# Patient Record
Sex: FEMALE | Race: WHITE | NOT HISPANIC OR LATINO | Employment: FULL TIME | URBAN - METROPOLITAN AREA
[De-identification: names, ages, dates, MRNs, and addresses within clinical notes are randomized per-mention and may not be internally consistent; named-entity substitution may affect disease eponyms.]

---

## 2019-07-16 RX ORDER — GABAPENTIN 100 MG/1
100 CAPSULE ORAL 2 TIMES DAILY
Qty: 180 CAPSULE | Refills: 1 | Status: SHIPPED | OUTPATIENT
Start: 2019-07-16 | End: 2020-01-20 | Stop reason: SDUPTHER

## 2019-08-06 RX ORDER — OMEPRAZOLE 20 MG/1
20 CAPSULE, DELAYED RELEASE ORAL DAILY
Qty: 90 CAPSULE | Refills: 0 | Status: SHIPPED | OUTPATIENT
Start: 2019-08-06 | End: 2019-11-04 | Stop reason: SDUPTHER

## 2019-08-19 RX ORDER — AMLODIPINE BESYLATE 2.5 MG/1
TABLET ORAL
Qty: 90 TABLET | Refills: 0 | Status: SHIPPED | OUTPATIENT
Start: 2019-08-19 | End: 2019-09-09 | Stop reason: ALTCHOICE

## 2019-09-09 ENCOUNTER — OFFICE VISIT (OUTPATIENT)
Dept: INTERNAL MEDICINE | Facility: CLINIC | Age: 69
End: 2019-09-09

## 2019-09-09 VITALS
BODY MASS INDEX: 43.15 KG/M2 | SYSTOLIC BLOOD PRESSURE: 122 MMHG | WEIGHT: 259 LBS | DIASTOLIC BLOOD PRESSURE: 66 MMHG | HEIGHT: 65 IN | HEART RATE: 80 BPM

## 2019-09-09 DIAGNOSIS — I10 ESSENTIAL HYPERTENSION: Primary | ICD-10-CM

## 2019-09-09 DIAGNOSIS — B37.31 RECURRENT CANDIDIASIS OF VAGINA: ICD-10-CM

## 2019-09-09 DIAGNOSIS — R19.4 BOWEL HABIT CHANGES: ICD-10-CM

## 2019-09-09 PROBLEM — E78.5 HYPERLIPIDEMIA: Status: ACTIVE | Noted: 2019-09-09

## 2019-09-09 PROBLEM — I34.0 MITRAL INSUFFICIENCY: Status: ACTIVE | Noted: 2019-09-09

## 2019-09-09 PROBLEM — R06.83 SNORING: Status: ACTIVE | Noted: 2018-04-23

## 2019-09-09 PROBLEM — K56.609 BOWEL OBSTRUCTION: Status: ACTIVE | Noted: 2019-09-09

## 2019-09-09 PROCEDURE — 99214 OFFICE O/P EST MOD 30 MIN: CPT | Performed by: INTERNAL MEDICINE

## 2019-09-09 RX ORDER — BUSPIRONE HYDROCHLORIDE 15 MG/1
15 TABLET ORAL 2 TIMES DAILY
Refills: 0 | COMMUNITY
Start: 2019-08-09 | End: 2019-11-14 | Stop reason: SDUPTHER

## 2019-09-09 RX ORDER — OLMESARTAN MEDOXOMIL 40 MG/1
TABLET ORAL DAILY
Refills: 1 | COMMUNITY
Start: 2019-08-15 | End: 2020-02-11 | Stop reason: SDUPTHER

## 2019-09-09 RX ORDER — FEXOFENADINE HCL 180 MG/1
180 TABLET ORAL DAILY
COMMUNITY
End: 2022-10-13

## 2019-09-09 RX ORDER — FLUCONAZOLE 100 MG/1
100 TABLET ORAL DAILY
Qty: 7 TABLET | Refills: 1 | Status: SHIPPED | OUTPATIENT
Start: 2019-09-09 | End: 2019-10-16

## 2019-09-09 RX ORDER — SENNOSIDES 8.6 MG
650 CAPSULE ORAL 2 TIMES DAILY
COMMUNITY
End: 2019-10-16

## 2019-09-09 RX ORDER — IBUPROFEN 200 MG
200 TABLET ORAL EVERY 6 HOURS PRN
COMMUNITY
End: 2020-07-20 | Stop reason: ALTCHOICE

## 2019-09-09 RX ORDER — CLOTRIMAZOLE 1 G/ML
SOLUTION TOPICAL 2 TIMES DAILY
COMMUNITY
End: 2021-01-25

## 2019-09-09 RX ORDER — METOPROLOL SUCCINATE 25 MG/1
TABLET, EXTENDED RELEASE ORAL
Refills: 3 | COMMUNITY
Start: 2019-08-15 | End: 2020-03-09 | Stop reason: SDUPTHER

## 2019-09-09 NOTE — PROGRESS NOTES
"  Assessment and Plan  Helena was seen today for hypertension and weight gain.    Diagnoses and all orders for this visit:    Essential hypertension  Comments:  controlled.    Orders:  -     CBC & Differential  -     TSH    Recurrent candidiasis of vagina  Comments:  check BS, treat with longer course of oral antifungals.   Orders:  -     Comprehensive Metabolic Panel  -     Hemoglobin A1c    Bowel habit changes  Comments:  difficult with her dietary problems related to obstruction- can't add fiber- need c-scope to r/o microscopic issue, etc.    Orders:  -     Ambulatory Referral to Gastroenterology    Other orders  -     fluconazole (DIFLUCAN) 100 MG tablet; Take 1 tablet by mouth Daily.      F/U and Patient Instructions    Return in about 3 months (around 12/9/2019).  There are no Patient Instructions on file for this visit.    Subjective    Helena Romero is a 69 y.o. female being seen in our office today for Hypertension and Weight Gain     History of the Present Illness  HPI  Very frustrated with weight gain- diet is limited by her bowel issues- she had a torsion and is off ruffage, nuts, etc.  She is frustrated.  Makes all of her arthritis worse.  She is looking into a 3 wheel bicycle- can ride around her neighborhood.    Recurrent/constant vaginal yeast infection for about 2 years- culture and punch biopsy by Dr. Reed- she has tried several topical antifungals that have helped a little bit it always comes back- notices similar rashes behind her ears, skin folds and under breasts.    She has had multiple loose stools/day for 4-5 years- denies any \"normal\" formed stool.  Can have incontinence.  There are some days she does not have any diarrhea.  Worse on days she has forced OT, etc.    Saw ortho about her R leg pain- di dMRI which showed disc disease- now she takes 2 Tylenol, 2 Advil, 2 Benadryl and 3 neurontin at HS - she falls asleep and stay asleep all night. Uses Advil and Tylenol during the day, as needed. "    Patient History        Significant Past History  The following portions of the patient's history were reviewed and updated as appropriate:PMHroutine: Social history , Allergies, Current Medications, Active Problem List and Health Maintenance              Social History  She  reports that she has never smoked. She does not have any smokeless tobacco history on file. She reports that she does not drink alcohol or use drugs.                         Review of Symptoms  Review of Systems   Constitution: Positive for weight gain.   HENT: Negative.    Cardiovascular: Negative.    Respiratory: Negative.    Endocrine: Negative.    Musculoskeletal: Positive for arthritis and back pain.   Gastrointestinal: Positive for bowel incontinence and diarrhea. Negative for abdominal pain and heartburn.   Genitourinary: Negative.    Neurological: Negative.    Psychiatric/Behavioral: Negative.      Objective  Vital Signs         BP Readings from Last 1 Encounters:   09/09/19 122/66     Wt Readings from Last 3 Encounters:   09/09/19 117 kg (259 lb)   Body mass index is 43.1 kg/m².          Physical Exam   Physical Exam   Constitutional: No distress.   Eyes: No scleral icterus.   Cardiovascular: Normal rate, regular rhythm, normal heart sounds and intact distal pulses.   Pulmonary/Chest: Effort normal and breath sounds normal.   Abdominal: Soft. Tenderness: very mild diffuse tenderness.   Musculoskeletal: She exhibits no edema or tenderness.   Lymphadenopathy:     She has no cervical adenopathy.   Skin: Skin is warm.   Skin folds with erythema,, moist     Data Reviewed    No results found for this or any previous visit (from the past 2016 hour(s)).

## 2019-09-12 ENCOUNTER — TELEPHONE (OUTPATIENT)
Dept: INTERNAL MEDICINE | Facility: CLINIC | Age: 69
End: 2019-09-12

## 2019-09-12 NOTE — TELEPHONE ENCOUNTER
Dr. HOLLAND PT called said that her Premarin is going to be very costly she would like to know if there is an alternative she can try

## 2019-09-30 ENCOUNTER — TELEPHONE (OUTPATIENT)
Dept: INTERNAL MEDICINE | Facility: CLINIC | Age: 69
End: 2019-09-30

## 2019-09-30 NOTE — TELEPHONE ENCOUNTER
Dr. HOLLAND PT would like to be seen today for possible pneumonia please advise time     PT # [phone]

## 2019-10-01 ENCOUNTER — OFFICE VISIT (OUTPATIENT)
Dept: INTERNAL MEDICINE | Facility: CLINIC | Age: 69
End: 2019-10-01

## 2019-10-01 VITALS — BODY MASS INDEX: 42.65 KG/M2 | WEIGHT: 256 LBS | HEIGHT: 65 IN

## 2019-10-01 DIAGNOSIS — Z23 NEED FOR IMMUNIZATION AGAINST INFLUENZA: Primary | ICD-10-CM

## 2019-10-01 DIAGNOSIS — J40 BRONCHITIS: Primary | ICD-10-CM

## 2019-10-01 PROCEDURE — G0008 ADMIN INFLUENZA VIRUS VAC: HCPCS | Performed by: INTERNAL MEDICINE

## 2019-10-01 PROCEDURE — 90653 IIV ADJUVANT VACCINE IM: CPT | Performed by: INTERNAL MEDICINE

## 2019-10-01 PROCEDURE — 99213 OFFICE O/P EST LOW 20 MIN: CPT | Performed by: INTERNAL MEDICINE

## 2019-10-01 RX ORDER — AMOXICILLIN 875 MG/1
875 TABLET, COATED ORAL 2 TIMES DAILY
Qty: 14 TABLET | Refills: 0 | Status: SHIPPED | OUTPATIENT
Start: 2019-10-01 | End: 2020-01-20

## 2019-10-01 NOTE — PROGRESS NOTES
Assessment and Plan  Helena was seen today for wheezing, cough and fatigue.    Diagnoses and all orders for this visit:    Bronchitis  Comments:  treat with abx, rest and inhalers, as she has at home.  Call with worseing.      F/U and Patient Instructions    No Follow-up on file.  There are no Patient Instructions on file for this visit.    Subjective    Helena Romero is a 69 y.o. female being seen in our office today for Wheezing; Cough; and Fatigue     History of the Present Illness  HPI Here with the above complaints- has not been feeling well for about 5 days.  She is taking some OTC sinus meds that don't seem to be helping any longer.  Over the weekend had chills but never measured a fever.  Feels exhausted, wheezing started yesterday evening.  Cough is not productive.  She does have some SOB.      Patient History        Significant Past History  The following portions of the patient's history were reviewed and updated as appropriate:PMHroutine: Social history , Allergies, Current Medications, Active Problem List and Health Maintenance              Social History  She  reports that she has never smoked. She does not have any smokeless tobacco history on file. She reports that she does not drink alcohol or use drugs.                         Review of Symptoms  Review of Systems   Constitution: Positive for chills and malaise/fatigue. Negative for decreased appetite.   HENT: Positive for congestion.    Cardiovascular: Negative.    Respiratory: Positive for cough and wheezing. Negative for shortness of breath and sputum production.    Musculoskeletal: Joint pain: hip pain has resolved since using hemp oil directly  on it just a few times.     Objective  Vital Signs         BP Readings from Last 1 Encounters:   09/09/19 122/66     Wt Readings from Last 3 Encounters:   10/01/19 116 kg (256 lb)   09/09/19 117 kg (259 lb)   Body mass index is 42.6 kg/m².          Physical Exam   Physical Exam   Pulmonary/Chest: She  has no wheezes. Rales: RLL. She exhibits no tenderness.   Musculoskeletal: She exhibits no edema.     Data Reviewed    No results found for this or any previous visit (from the past 2016 hour(s)).

## 2019-10-16 ENCOUNTER — OFFICE VISIT (OUTPATIENT)
Dept: GASTROENTEROLOGY | Facility: CLINIC | Age: 69
End: 2019-10-16

## 2019-10-16 VITALS
TEMPERATURE: 98.3 F | DIASTOLIC BLOOD PRESSURE: 80 MMHG | BODY MASS INDEX: 42.19 KG/M2 | HEIGHT: 65 IN | SYSTOLIC BLOOD PRESSURE: 134 MMHG | WEIGHT: 253.2 LBS

## 2019-10-16 DIAGNOSIS — K59.1 FUNCTIONAL DIARRHEA: Primary | ICD-10-CM

## 2019-10-16 DIAGNOSIS — K21.9 GASTROESOPHAGEAL REFLUX DISEASE, ESOPHAGITIS PRESENCE NOT SPECIFIED: ICD-10-CM

## 2019-10-16 DIAGNOSIS — Z87.19 HISTORY OF SMALL BOWEL OBSTRUCTION: ICD-10-CM

## 2019-10-16 PROCEDURE — 99204 OFFICE O/P NEW MOD 45 MIN: CPT | Performed by: INTERNAL MEDICINE

## 2019-10-16 NOTE — PATIENT INSTRUCTIONS
Continue to monitor diarrhea    Will need colonoscopy if diarrhea returns    For any additional questions, concerns or changes to your condition after today's office visit please contact the office at [phone].

## 2019-10-16 NOTE — PROGRESS NOTES
Take prescribed medication as directed.    Use your inhaler 4 times daily.    Have a follow-up check for worsening or persistent symptoms.    Try not to smoke.   Chief Complaint   Patient presents with   • Diarrhea       Subjective     HPI    Helena Romero is a 69 y.o. female with a past medical history noted below who presents for evaluation of diarrhea.  Symptoms present for 6 years!  She times this to following a small bowel obstruction treated with nasogastric decompression.  She did not have to have any surgery for it..  She has been having 5-6 BMs daily.  Associated with urgency.  Mostly after meals.  No blood in her stol.  No associated weight loss.  Now she has been on abx for a tooth infection and is down to 1 formed BM per day.  She denies that she is ever really tried any other diarrhea suppressing medication in the past.    She has a history of SBO, thought to be due to adhesions following her gallbladder surgery.    No family hx of GI malignancy    Last colonoscopy 8 years ago and normal    She has had a adrian    No smoking, no excess ETOH    Works as a     She does have GERD controlled with daily omeprazole.  She is working on weight loss with her PCP.      Past Medical History:   Diagnosis Date   • Allergic    • Anxiety    • Asthma    • Depression    • Diarrhea    • GERD (gastroesophageal reflux disease)    • Hypertension    • Low back pain    • Neuromuscular disorder (CMS/HCC)    • Obesity    • Pneumonia     x 3   • Yeast infection          Current Outpatient Medications:   •  busPIRone (BUSPAR) 15 MG tablet, Take 15 mg by mouth 2 (Two) Times a Day., Disp: , Rfl: 0  •  clotrimazole (LOTRIMIN) 1 % external solution, Apply  topically to the appropriate area as directed 2 (Two) Times a Day., Disp: , Rfl:   •  conjugated estrogens (PREMARIN) 0.625 MG/GM vaginal cream, Insert  into the vagina Daily., Disp: 30 g, Rfl: 5  •  fexofenadine (ALLEGRA) 180 MG tablet, Take 180 mg by mouth Daily., Disp: , Rfl:   •  gabapentin (NEURONTIN) 100 MG capsule, Take 1 capsule by mouth 2 (Two) Times a Day. (Patient taking differently: Take 300 mg by mouth Every  Night.), Disp: 180 capsule, Rfl: 1  •  ibuprofen (ADVIL,MOTRIN) 200 MG tablet, Take 200 mg by mouth Every 6 (Six) Hours As Needed for Mild Pain ., Disp: , Rfl:   •  metoprolol succinate XL (TOPROL-XL) 25 MG 24 hr tablet, TK 1 T PO  QD PRF PALPITATIONS, Disp: , Rfl: 3  •  olmesartan (BENICAR) 40 MG tablet, Take  by mouth Daily., Disp: , Rfl: 1  •  omeprazole (PRILOSEC) 20 MG capsule, Take 1 capsule by mouth Daily., Disp: 90 capsule, Rfl: 0  •  amoxicillin (AMOXIL) 875 MG tablet, Take 1 tablet by mouth 2 (Two) Times a Day., Disp: 14 tablet, Rfl: 0    Allergies   Allergen Reactions   • Lisinopril Hives   • Codeine Rash       Social History     Socioeconomic History   • Marital status:      Spouse name: Not on file   • Number of children: Not on file   • Years of education: Not on file   • Highest education level: Not on file   Tobacco Use   • Smoking status: Never Smoker   • Smokeless tobacco: Never Used   Substance and Sexual Activity   • Alcohol use: No     Frequency: Never   • Drug use: No       Family History   Problem Relation Age of Onset   • Pancreatic cancer Paternal Aunt        Review of Systems   Constitutional: Negative for activity change, appetite change and fatigue.   HENT: Negative for sore throat and trouble swallowing.    Respiratory: Negative.    Cardiovascular: Negative.    Gastrointestinal: Positive for diarrhea. Negative for abdominal distention, abdominal pain and blood in stool.   Endocrine: Negative for cold intolerance and heat intolerance.   Genitourinary: Negative for difficulty urinating, dysuria and frequency.   Musculoskeletal: Negative for arthralgias, back pain and myalgias.   Skin: Negative.    Hematological: Negative for adenopathy. Does not bruise/bleed easily.   All other systems reviewed and are negative.      Objective     Vitals:    10/16/19 1256   BP: 134/80   Temp: 98.3 °F (36.8 °C)         10/16/19  1256   Weight: 115 kg (253 lb 3.2 oz)     Body mass index is 42.13  kg/m².    Physical Exam   Constitutional: She is oriented to person, place, and time. She appears well-developed and well-nourished. No distress.   Obese   HENT:   Head: Normocephalic and atraumatic.   Right Ear: External ear normal.   Left Ear: External ear normal.   Nose: Nose normal.   Mouth/Throat: Oropharynx is clear and moist.   Eyes: Conjunctivae and EOM are normal. Right eye exhibits no discharge. Left eye exhibits no discharge. No scleral icterus.   Neck: Normal range of motion. Neck supple. No thyromegaly present.   No supraclavicular adenopathy   Cardiovascular: Normal rate, regular rhythm, normal heart sounds and intact distal pulses. Exam reveals no gallop.   No murmur heard.  No lower extremity edema   Pulmonary/Chest: Effort normal and breath sounds normal. No respiratory distress. She has no wheezes.   Abdominal: Soft. Normal appearance and bowel sounds are normal. She exhibits no distension and no mass. There is no hepatosplenomegaly. There is no tenderness. There is no rigidity, no rebound and no guarding. No hernia.   Genitourinary:   Genitourinary Comments: Rectal exam deferred   Musculoskeletal: Normal range of motion. She exhibits no edema or tenderness.   No atrophy of upper or lower extremities.  Normal digits and nails of both hands.   Lymphadenopathy:     She has no cervical adenopathy.   Neurological: She is alert and oriented to person, place, and time. She displays no atrophy. Coordination normal.   Skin: Skin is warm and dry. No rash noted. She is not diaphoretic. No erythema.   Psychiatric: She has a normal mood and affect. Her behavior is normal. Judgment and thought content normal.   Vitals reviewed.      No results found for: WBC, RBC, HGB, HCT, MCV, MCH, MCHC, RDW, RDWSD, MPV, PLT, NEUTRORELPCT, LYMPHORELPCT, MONORELPCT, EOSRELPCT, BASORELPCT, AUTOIGPER, NEUTROABS, LYMPHSABS, MONOSABS, EOSABS, BASOSABS, AUTOIGNUM, NRBC    No results found for: GLUCOSE, NA, K, CO2, CL, ANIONGAP,  CREATININE, BUN, BCR, CALCIUM, EGFRIFNONA, ALKPHOS, PROTEINTOT, ALT, AST, BILITOT, ALBUMIN, GLOB, LABIL2      Imaging Results (last 7 days)     ** No results found for the last 168 hours. 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  Interestingly seems to have responded to amoxicillin    History of small bowel obstruction: Resolved without surgery    GERD: Able on low-dose PPI    Plan  Given that she is doing well at this point with one formed bowel movement a day, will continue to monitor symptoms.  If they return, then will try a bile acid binder and proceed with endoscopy for mucosal samples    Continue the omeprazole    Continue to monitor stools    Helena was seen today for diarrhea.    Diagnoses and all orders for this visit:    Functional diarrhea    History of small bowel obstruction    Gastroesophageal reflux disease, esophagitis presence not specified        I have discussed the above plan with the patient.  They verbalize understanding and are in agreement with the plan.  They have been advised to contact the office for any questions, concerns, or changes related to their health.    Dictated utilizing Dragon dictation

## 2019-11-04 RX ORDER — OMEPRAZOLE 20 MG/1
20 CAPSULE, DELAYED RELEASE ORAL DAILY
Qty: 90 CAPSULE | Refills: 0 | Status: SHIPPED | OUTPATIENT
Start: 2019-11-04 | End: 2020-02-14

## 2019-11-14 RX ORDER — BUSPIRONE HYDROCHLORIDE 15 MG/1
15 TABLET ORAL 2 TIMES DAILY
Qty: 180 TABLET | Refills: 1 | Status: SHIPPED | OUTPATIENT
Start: 2019-11-14 | End: 2020-05-11

## 2020-01-20 ENCOUNTER — OFFICE VISIT (OUTPATIENT)
Dept: INTERNAL MEDICINE | Facility: CLINIC | Age: 70
End: 2020-01-20

## 2020-01-20 VITALS — HEIGHT: 65 IN | WEIGHT: 255 LBS | BODY MASS INDEX: 42.49 KG/M2

## 2020-01-20 DIAGNOSIS — M19.91 PRIMARY OSTEOARTHRITIS, UNSPECIFIED SITE: ICD-10-CM

## 2020-01-20 DIAGNOSIS — I10 ESSENTIAL HYPERTENSION: Primary | ICD-10-CM

## 2020-01-20 PROCEDURE — 99213 OFFICE O/P EST LOW 20 MIN: CPT | Performed by: INTERNAL MEDICINE

## 2020-01-20 RX ORDER — GABAPENTIN 100 MG/1
300 CAPSULE ORAL NIGHTLY
Qty: 90 CAPSULE | Refills: 1 | Status: SHIPPED | OUTPATIENT
Start: 2020-01-20 | End: 2020-04-30 | Stop reason: SDUPTHER

## 2020-01-20 NOTE — PROGRESS NOTES
Assessment and Plan  Helena was seen today for hypertension and hip pain.    Diagnoses and all orders for this visit:    Essential hypertension  Comments:  better on repeat check today- will have her check at home.     Primary osteoarthritis, unspecified site  Comments:  ok to take Tylenol- increase exercise which would be the biggest help.  Orders:  -     gabapentin (NEURONTIN) 100 MG capsule; Take 3 capsules by mouth Every Night.      F/U and Patient Instructions    Return in about 6 months (around 7/20/2020).  There are no Patient Instructions on file for this visit.    Subjective    Helena Romero is a 69 y.o. female being seen in our office today for Hypertension and Hip Pain     History of the Present Illness  HPI Here for reg f/u- she has had trouble with her stomach so she's eaten more bread- therefore not doing well with keto diet!  Has to take the Tylenol arthritis 1-2 x a day for some hip pain.  Has had some congestion this week, always worries about it because of her history of pneumonia.       Patient History        Significant Past History  The following portions of the patient's history were reviewed and updated as appropriate:PMHroutine: Social history , Allergies, Current Medications, Active Problem List and Health Maintenance              Social History  She  reports that she has never smoked. She has never used smokeless tobacco. She reports that she does not drink alcohol or use drugs.                         Review of Symptoms  Review of Systems   Constitution: Negative.   HENT: Positive for congestion.    Cardiovascular: Negative.    Respiratory: Negative for cough.    Musculoskeletal: Positive for back pain and joint pain.   Gastrointestinal: Positive for bloating. Negative for change in bowel habit.   Neurological: Negative.    Psychiatric/Behavioral: Negative.      Objective  Vital Signs         BP Readings from Last 1 Encounters:   10/16/19 134/80     Wt Readings from Last 3 Encounters:    01/20/20 116 kg (255 lb)   10/16/19 115 kg (253 lb 3.2 oz)   10/01/19 116 kg (256 lb)   Body mass index is 42.43 kg/m².          Physical Exam   Physical Exam   Constitutional: No distress.   Cardiovascular: Normal rate and regular rhythm.   Pulmonary/Chest: Effort normal and breath sounds normal.   Musculoskeletal: She exhibits no edema.     Data Reviewed    No results found for this or any previous visit (from the past 2016 hour(s)).

## 2020-01-21 LAB
ALBUMIN SERPL-MCNC: 4.2 G/DL (ref 3.7–4.7)
ALBUMIN/GLOB SERPL: 2 {RATIO} (ref 1.2–2.2)
ALP SERPL-CCNC: 99 IU/L (ref 39–117)
ALT SERPL-CCNC: 21 IU/L (ref 0–32)
AST SERPL-CCNC: 17 IU/L (ref 0–40)
BASOPHILS # BLD AUTO: 0 X10E3/UL (ref 0–0.2)
BASOPHILS NFR BLD AUTO: 0 %
BILIRUB SERPL-MCNC: 0.4 MG/DL (ref 0–1.2)
BUN SERPL-MCNC: 9 MG/DL (ref 8–27)
BUN/CREAT SERPL: 10 (ref 12–28)
CALCIUM SERPL-MCNC: 8.9 MG/DL (ref 8.7–10.3)
CHLORIDE SERPL-SCNC: 105 MMOL/L (ref 96–106)
CO2 SERPL-SCNC: 20 MMOL/L (ref 20–29)
CREAT SERPL-MCNC: 0.86 MG/DL (ref 0.57–1)
EOSINOPHIL # BLD AUTO: 0.2 X10E3/UL (ref 0–0.4)
EOSINOPHIL NFR BLD AUTO: 3 %
ERYTHROCYTE [DISTWIDTH] IN BLOOD BY AUTOMATED COUNT: 14 % (ref 11.7–15.4)
GLOBULIN SER CALC-MCNC: 2.1 G/DL (ref 1.5–4.5)
GLUCOSE SERPL-MCNC: 101 MG/DL (ref 65–99)
HBA1C MFR BLD: 5.5 % (ref 4.8–5.6)
HCT VFR BLD AUTO: 39.8 % (ref 34–46.6)
HGB BLD-MCNC: 13 G/DL (ref 11.1–15.9)
IMM GRANULOCYTES # BLD AUTO: 0 X10E3/UL (ref 0–0.1)
IMM GRANULOCYTES NFR BLD AUTO: 0 %
LYMPHOCYTES # BLD AUTO: 1.7 X10E3/UL (ref 0.7–3.1)
LYMPHOCYTES NFR BLD AUTO: 23 %
MCH RBC QN AUTO: 28.8 PG (ref 26.6–33)
MCHC RBC AUTO-ENTMCNC: 32.7 G/DL (ref 31.5–35.7)
MCV RBC AUTO: 88 FL (ref 79–97)
MONOCYTES # BLD AUTO: 0.5 X10E3/UL (ref 0.1–0.9)
MONOCYTES NFR BLD AUTO: 7 %
NEUTROPHILS # BLD AUTO: 4.7 X10E3/UL (ref 1.4–7)
NEUTROPHILS NFR BLD AUTO: 67 %
PLATELET # BLD AUTO: 246 X10E3/UL (ref 150–450)
POTASSIUM SERPL-SCNC: 4.3 MMOL/L (ref 3.5–5.2)
PROT SERPL-MCNC: 6.3 G/DL (ref 6–8.5)
RBC # BLD AUTO: 4.52 X10E6/UL (ref 3.77–5.28)
SODIUM SERPL-SCNC: 144 MMOL/L (ref 134–144)
TSH SERPL DL<=0.005 MIU/L-ACNC: 1.61 UIU/ML (ref 0.45–4.5)
WBC # BLD AUTO: 7 X10E3/UL (ref 3.4–10.8)

## 2020-02-11 RX ORDER — OLMESARTAN MEDOXOMIL 40 MG/1
40 TABLET ORAL DAILY
Qty: 90 TABLET | Refills: 1 | Status: SHIPPED | OUTPATIENT
Start: 2020-02-11 | End: 2020-08-07

## 2020-02-14 ENCOUNTER — OFFICE VISIT (OUTPATIENT)
Dept: INTERNAL MEDICINE | Facility: CLINIC | Age: 70
End: 2020-02-14

## 2020-02-14 VITALS
HEART RATE: 76 BPM | DIASTOLIC BLOOD PRESSURE: 80 MMHG | HEIGHT: 65 IN | WEIGHT: 256.2 LBS | BODY MASS INDEX: 42.69 KG/M2 | RESPIRATION RATE: 14 BRPM | SYSTOLIC BLOOD PRESSURE: 136 MMHG

## 2020-02-14 DIAGNOSIS — R05.9 COUGH: ICD-10-CM

## 2020-02-14 DIAGNOSIS — R00.2 PALPITATIONS: Primary | ICD-10-CM

## 2020-02-14 PROCEDURE — 93000 ELECTROCARDIOGRAM COMPLETE: CPT | Performed by: NURSE PRACTITIONER

## 2020-02-14 PROCEDURE — 99215 OFFICE O/P EST HI 40 MIN: CPT | Performed by: NURSE PRACTITIONER

## 2020-02-14 RX ORDER — ALBUTEROL SULFATE 2.5 MG/3ML
2.5 SOLUTION RESPIRATORY (INHALATION) EVERY 4 HOURS PRN
COMMUNITY
End: 2020-07-20

## 2020-02-14 RX ORDER — OMEPRAZOLE 20 MG/1
20 CAPSULE, DELAYED RELEASE ORAL DAILY
Qty: 90 CAPSULE | Refills: 0 | Status: SHIPPED | OUTPATIENT
Start: 2020-02-14 | End: 2020-05-11

## 2020-02-14 NOTE — PROGRESS NOTES
Subjective   No chief complaint on file.      History of Present Illness   69 y.o. female presents with cough and chest congestion.  2 nights she took some cold and congestion medication and 30 minutes later had palpitations. She has had this in the past  And was advised by cardiology to take metoprolol--she took 75 mg. Her  has an apple watch and it showed a-fib. She was short of breath but denies chest pain, diaphoretic, slurred speech, numbness or tingling. She did not call cardiology--sees Dr. Prince; made appt on 3/26.      Patient Active Problem List   Diagnosis   • Essential hypertension   • Hyperlipidemia   • Mitral insufficiency   • Obesity   • Snoring   • Functional diarrhea   • History of small bowel obstruction   • Gastroesophageal reflux disease       Allergies   Allergen Reactions   • Lisinopril Hives   • Codeine Rash       Current Outpatient Medications on File Prior to Visit   Medication Sig Dispense Refill   • busPIRone (BUSPAR) 15 MG tablet Take 1 tablet by mouth 2 (Two) Times a Day. 180 tablet 1   • clotrimazole (LOTRIMIN) 1 % external solution Apply  topically to the appropriate area as directed 2 (Two) Times a Day.     • conjugated estrogens (PREMARIN) 0.625 MG/GM vaginal cream Insert  into the vagina Daily. 30 g 5   • fexofenadine (ALLEGRA) 180 MG tablet Take 180 mg by mouth Daily.     • gabapentin (NEURONTIN) 100 MG capsule Take 3 capsules by mouth Every Night. 90 capsule 1   • ibuprofen (ADVIL,MOTRIN) 200 MG tablet Take 200 mg by mouth Every 6 (Six) Hours As Needed for Mild Pain .     • metoprolol succinate XL (TOPROL-XL) 25 MG 24 hr tablet TK 1 T PO  QD PRF PALPITATIONS  3   • olmesartan (BENICAR) 40 MG tablet Take 1 tablet by mouth Daily. 90 tablet 1   • omeprazole (priLOSEC) 20 MG capsule TAKE 1 CAPSULE BY MOUTH DAILY 90 capsule 0   • [DISCONTINUED] omeprazole (priLOSEC) 20 MG capsule TAKE 1 CAPSULE BY MOUTH DAILY 90 capsule 0     No current facility-administered medications on  file prior to visit.        Past Medical History:   Diagnosis Date   • Acute bronchitis    • Allergic    • Anxiety    • Asthma    • Borderline hyperglycemia    • Cat bite    • Depression    • Diarrhea    • GERD (gastroesophageal reflux disease)    • Hypertension    • Hypocalcemia    • Insomnia    • Low back pain    • Low back pain, episodic    • Need for diphtheria-tetanus-pertussis (Tdap) vaccine    • Neuromuscular disorder (CMS/HCC)    • Obesity    • Osteoarthritis, generalized    • Panic attack    • Pneumonia     x 3   • Seasonal allergies    • Sleep disturbances    • Trochanteric bursitis    • Vaginal candidiasis    • Yeast infection        Family History   Problem Relation Age of Onset   • Pancreatic cancer Paternal Aunt        Social History     Socioeconomic History   • Marital status:      Spouse name: Not on file   • Number of children: Not on file   • Years of education: Not on file   • Highest education level: Not on file   Tobacco Use   • Smoking status: Never Smoker   • Smokeless tobacco: Never Used   Substance and Sexual Activity   • Alcohol use: No     Frequency: Never   • Drug use: No       Past Surgical History:   Procedure Laterality Date   • CHOLECYSTECTOMY     • COLONOSCOPY  approx 2011    normal per patient-repeat 10 years   • DILATION AND CURETTAGE, DIAGNOSTIC / THERAPEUTIC     • TONSILLECTOMY     • TUBAL ABDOMINAL LIGATION         The following portions of the patient's history were reviewed and updated as appropriate: problem list, allergies, current medications, past medical history and past social history.    Review of Systems   Respiratory: Negative for shortness of breath.    Cardiovascular: Negative for chest pain.       Immunization History   Administered Date(s) Administered   • FLUAD TRI 65YR+ 10/01/2019   • Fluad Quad 10/01/2019   • Fluzone High Dose =>65 Years ([employer] ONLY) 01/01/2016, 11/02/2017, 11/01/2018   • Pneumococcal Conjugate 13-Valent (PCV13) 11/02/2017   •  Pneumococcal Polysaccharide (PPSV23) 01/06/2015   • Tdap 06/26/2017       Objective   There were no vitals filed for this visit.  There is no height or weight on file to calculate BMI.  Physical Exam   Constitutional: She is oriented to person, place, and time. She appears well-developed and well-nourished.   HENT:   Head: Normocephalic and atraumatic.   Cardiovascular: Normal rate, regular rhythm, S1 normal, S2 normal, normal heart sounds and intact distal pulses.   Pulmonary/Chest: Effort normal and breath sounds normal.   Musculoskeletal: She exhibits no edema.   Neurological: She is alert and oriented to person, place, and time.   Skin: Skin is warm and dry.   Psychiatric: She has a normal mood and affect.   Vitals reviewed.        ECG 12 Lead  Date/Time: 2/14/2020 3:01 PM  Performed by: Linda Powers APRN  Authorized by: Linda Powers APRN   Previous ECG: no previous ECG available  Rhythm: sinus rhythm  Rate: normal  ST Segments: ST segments normal  T Waves: T waves normal  QRS axis: normal    Clinical impression: normal ECG          Assessment/Plan   Helena was seen today for uri.    Diagnoses and all orders for this visit:    Palpitations  Comments:  Apple watch shows A-fib. Today's EKG shows NSR. Spoke with Dr. Prince--labs as below & he will place ZioPatch. CHADs-Vasc2 score 3. No decongestants.   Orders:  -     CBC Auto Differential  -     Comprehensive Metabolic Panel  -     TSH  -     Magnesium    Cough  Comments:  Mucinex and ok to use Symbicort.     Discussed case with her cardiologist Dr. Prince as noted above; no anticoagulation at this time.    40 minutes spent with patient and  with greater than 50% counseling and coordinating care regarding possible a-fib and consulting with cardiology.     Records reviewed include previous OV with Dr. Lagos as well as labs.     No follow-ups on file.

## 2020-02-15 LAB
ALBUMIN SERPL-MCNC: 4.5 G/DL (ref 3.5–5.2)
ALBUMIN/GLOB SERPL: 1.7 G/DL
ALP SERPL-CCNC: 108 U/L (ref 39–117)
ALT SERPL-CCNC: 31 U/L (ref 1–33)
AST SERPL-CCNC: 24 U/L (ref 1–32)
BASOPHILS # BLD AUTO: 0.04 10*3/MM3 (ref 0–0.2)
BASOPHILS NFR BLD AUTO: 0.5 % (ref 0–1.5)
BILIRUB SERPL-MCNC: 0.7 MG/DL (ref 0.2–1.2)
BUN SERPL-MCNC: 7 MG/DL (ref 8–23)
BUN/CREAT SERPL: 9.2 (ref 7–25)
CALCIUM SERPL-MCNC: 9.2 MG/DL (ref 8.6–10.5)
CHLORIDE SERPL-SCNC: 101 MMOL/L (ref 98–107)
CO2 SERPL-SCNC: 26.7 MMOL/L (ref 22–29)
CREAT SERPL-MCNC: 0.76 MG/DL (ref 0.57–1)
EOSINOPHIL # BLD AUTO: 0.14 10*3/MM3 (ref 0–0.4)
EOSINOPHIL NFR BLD AUTO: 1.6 % (ref 0.3–6.2)
ERYTHROCYTE [DISTWIDTH] IN BLOOD BY AUTOMATED COUNT: 13.3 % (ref 12.3–15.4)
GLOBULIN SER CALC-MCNC: 2.6 GM/DL
GLUCOSE SERPL-MCNC: 94 MG/DL (ref 65–99)
HCT VFR BLD AUTO: 40.4 % (ref 34–46.6)
HGB BLD-MCNC: 13.7 G/DL (ref 12–15.9)
IMM GRANULOCYTES # BLD AUTO: 0.04 10*3/MM3 (ref 0–0.05)
IMM GRANULOCYTES NFR BLD AUTO: 0.5 % (ref 0–0.5)
LYMPHOCYTES # BLD AUTO: 2.13 10*3/MM3 (ref 0.7–3.1)
LYMPHOCYTES NFR BLD AUTO: 24.1 % (ref 19.6–45.3)
MAGNESIUM SERPL-MCNC: 2.3 MG/DL (ref 1.6–2.4)
MCH RBC QN AUTO: 29.3 PG (ref 26.6–33)
MCHC RBC AUTO-ENTMCNC: 33.9 G/DL (ref 31.5–35.7)
MCV RBC AUTO: 86.3 FL (ref 79–97)
MONOCYTES # BLD AUTO: 0.63 10*3/MM3 (ref 0.1–0.9)
MONOCYTES NFR BLD AUTO: 7.1 % (ref 5–12)
NEUTROPHILS # BLD AUTO: 5.85 10*3/MM3 (ref 1.7–7)
NEUTROPHILS NFR BLD AUTO: 66.2 % (ref 42.7–76)
NRBC BLD AUTO-RTO: 0 /100 WBC (ref 0–0.2)
PLATELET # BLD AUTO: 268 10*3/MM3 (ref 140–450)
POTASSIUM SERPL-SCNC: 3.7 MMOL/L (ref 3.5–5.2)
PROT SERPL-MCNC: 7.1 G/DL (ref 6–8.5)
RBC # BLD AUTO: 4.68 10*6/MM3 (ref 3.77–5.28)
SODIUM SERPL-SCNC: 142 MMOL/L (ref 136–145)
TSH SERPL DL<=0.005 MIU/L-ACNC: 2.36 UIU/ML (ref 0.27–4.2)
WBC # BLD AUTO: 8.83 10*3/MM3 (ref 3.4–10.8)

## 2020-03-09 RX ORDER — METOPROLOL SUCCINATE 25 MG/1
25 TABLET, EXTENDED RELEASE ORAL DAILY
Qty: 90 TABLET | Refills: 3 | Status: SHIPPED | OUTPATIENT
Start: 2020-03-09 | End: 2021-01-25

## 2020-04-30 ENCOUNTER — TELEMEDICINE (OUTPATIENT)
Dept: INTERNAL MEDICINE | Facility: CLINIC | Age: 70
End: 2020-04-30

## 2020-04-30 ENCOUNTER — TELEPHONE (OUTPATIENT)
Dept: INTERNAL MEDICINE | Facility: CLINIC | Age: 70
End: 2020-04-30

## 2020-04-30 DIAGNOSIS — I48.0 PAROXYSMAL ATRIAL FIBRILLATION (HCC): ICD-10-CM

## 2020-04-30 DIAGNOSIS — R05.9 COUGH: Primary | ICD-10-CM

## 2020-04-30 DIAGNOSIS — M19.91 PRIMARY OSTEOARTHRITIS, UNSPECIFIED SITE: ICD-10-CM

## 2020-04-30 PROBLEM — I48.91 NEW ONSET A-FIB: Status: ACTIVE | Noted: 2020-02-14

## 2020-04-30 PROCEDURE — 99214 OFFICE O/P EST MOD 30 MIN: CPT | Performed by: INTERNAL MEDICINE

## 2020-04-30 RX ORDER — GABAPENTIN 300 MG/1
300 CAPSULE ORAL NIGHTLY
Qty: 90 CAPSULE | Refills: 1 | COMMUNITY
Start: 2020-04-30 | End: 2020-10-26

## 2020-04-30 RX ORDER — AMOXICILLIN 875 MG/1
875 TABLET, COATED ORAL 2 TIMES DAILY
Qty: 20 TABLET | Refills: 0 | Status: SHIPPED | OUTPATIENT
Start: 2020-04-30 | End: 2020-07-20

## 2020-04-30 NOTE — TELEPHONE ENCOUNTER
PT STATED THAT SHE IS HAVING PAIN IN HER LEFT SHOULDER BLADE IN HER BACK AND CONGESTION. PT REQUESTED TO KNOW WHAT COULD BE DONE ABOUT THIS.    PLEASE ADVISE

## 2020-04-30 NOTE — PROGRESS NOTES
Assessment and Plan  Helena was seen today for cough.    Diagnoses and all orders for this visit:    Cough  Comments:  sent in abx for her to hang onto over the weekend, don't think she needs yet.  Mucinex, pulm toilet, exercise, etc. to treat now.  Call with worsening.     Primary osteoarthritis, unspecified site  Comments:  ok to take Tylenol- increase exercise which would be the biggest help.    Paroxysmal atrial fibrillation (CMS/HCC)  Comments:  agree with prn metoporolol but if needs to take daily, call me or Niki.       F/U and Patient Instructions    No follow-ups on file.  There are no Patient Instructions on file for this visit.    Subjective    Helena Romero is a 70 y.o. female being seen in our office today for Cough     History of the Present Illness  HPI Via video visit- pt wanted to discuss some recurrent chest congestion and some pain in the L shoulder blade.   The pain started yesterday and is consistent with her recurrent lung congestion.  She has started her inhalers and  did some percussion.  She felt better afterward.  She has no fevers, SOB associated with elevated HR.  Started Mucinex several days ago as well as daily Allegra.  She was diagnosed with a fib and now on Xarelto.  She took an extra metoprolol last night for elevated HR- per orders from Dr. Prince.  This is very unusual.  She's using an all natural estrogen replacement cream with good results.  She works from home- staying away from potential exposures.       Patient History        Significant Past History  The following portions of the patient's history were reviewed and updated as appropriate:PMHroutine: Social history , Past Medical History, Allergies, Current Medications, Active Problem List and Health Maintenance              Social History  She  reports that she has never smoked. She has never used smokeless tobacco. She reports that she does not drink alcohol or use drugs.                         Review of  Symptoms  Review of Systems   Constitution: Negative for fever.   HENT: Positive for congestion.    Cardiovascular: Positive for irregular heartbeat. Negative for chest pain and dyspnea on exertion.   Respiratory: Positive for cough, shortness of breath, sputum production and wheezing.    Musculoskeletal: Negative.    Gastrointestinal: Negative.    Psychiatric/Behavioral: Negative.      Objective  Vital Signs         BP Readings from Last 1 Encounters:   02/14/20 136/80     Wt Readings from Last 3 Encounters:   02/14/20 116 kg (256 lb 3.2 oz)   01/20/20 116 kg (255 lb)   10/16/19 115 kg (253 lb 3.2 oz)   There is no height or weight on file to calculate BMI.          Physical Exam   Physical Exam   Constitutional: No distress.     Data Reviewed    Recent Results (from the past 2016 hour(s))   Comprehensive Metabolic Panel    Collection Time: 02/14/20  2:49 PM   Result Value Ref Range    Glucose 94 65 - 99 mg/dL    BUN 7 (L) 8 - 23 mg/dL    Creatinine 0.76 0.57 - 1.00 mg/dL    eGFR Non African Am 75 >60 mL/min/1.73    eGFR African Am 91 >60 mL/min/1.73    BUN/Creatinine Ratio 9.2 7.0 - 25.0    Sodium 142 136 - 145 mmol/L    Potassium 3.7 3.5 - 5.2 mmol/L    Chloride 101 98 - 107 mmol/L    Total CO2 26.7 22.0 - 29.0 mmol/L    Calcium 9.2 8.6 - 10.5 mg/dL    Total Protein 7.1 6.0 - 8.5 g/dL    Albumin 4.50 3.50 - 5.20 g/dL    Globulin 2.6 gm/dL    A/G Ratio 1.7 g/dL    Total Bilirubin 0.7 0.2 - 1.2 mg/dL    Alkaline Phosphatase 108 39 - 117 U/L    AST (SGOT) 24 1 - 32 U/L    ALT (SGPT) 31 1 - 33 U/L   TSH    Collection Time: 02/14/20  2:49 PM   Result Value Ref Range    TSH 2.360 0.270 - 4.200 uIU/mL   Magnesium    Collection Time: 02/14/20  2:49 PM   Result Value Ref Range    Magnesium 2.3 1.6 - 2.4 mg/dL   CBC & Differential    Collection Time: 02/14/20  2:49 PM   Result Value Ref Range    WBC 8.83 3.40 - 10.80 10*3/mm3    RBC 4.68 3.77 - 5.28 10*6/mm3    Hemoglobin 13.7 12.0 - 15.9 g/dL    Hematocrit 40.4 34.0 -  46.6 %    MCV 86.3 79.0 - 97.0 fL    MCH 29.3 26.6 - 33.0 pg    MCHC 33.9 31.5 - 35.7 g/dL    RDW 13.3 12.3 - 15.4 %    Platelets 268 140 - 450 10*3/mm3    Neutrophil Rel % 66.2 42.7 - 76.0 %    Lymphocyte Rel % 24.1 19.6 - 45.3 %    Monocyte Rel % 7.1 5.0 - 12.0 %    Eosinophil Rel % 1.6 0.3 - 6.2 %    Basophil Rel % 0.5 0.0 - 1.5 %    Neutrophils Absolute 5.85 1.70 - 7.00 10*3/mm3    Lymphocytes Absolute 2.13 0.70 - 3.10 10*3/mm3    Monocytes Absolute 0.63 0.10 - 0.90 10*3/mm3    Eosinophils Absolute 0.14 0.00 - 0.40 10*3/mm3    Basophils Absolute 0.04 0.00 - 0.20 10*3/mm3    Immature Granulocyte Rel % 0.5 0.0 - 0.5 %    Immature Grans Absolute 0.04 0.00 - 0.05 10*3/mm3    nRBC 0.0 0.0 - 0.2 /100 WBC

## 2020-05-11 RX ORDER — BUSPIRONE HYDROCHLORIDE 15 MG/1
TABLET ORAL
Qty: 180 TABLET | Refills: 1 | Status: SHIPPED | OUTPATIENT
Start: 2020-05-11 | End: 2020-11-05

## 2020-05-11 RX ORDER — OMEPRAZOLE 20 MG/1
20 CAPSULE, DELAYED RELEASE ORAL DAILY
Qty: 90 CAPSULE | Refills: 0 | Status: SHIPPED | OUTPATIENT
Start: 2020-05-11 | End: 2020-08-17

## 2020-07-20 ENCOUNTER — OFFICE VISIT (OUTPATIENT)
Dept: INTERNAL MEDICINE | Facility: CLINIC | Age: 70
End: 2020-07-20

## 2020-07-20 VITALS
SYSTOLIC BLOOD PRESSURE: 132 MMHG | HEIGHT: 65 IN | DIASTOLIC BLOOD PRESSURE: 74 MMHG | BODY MASS INDEX: 43.15 KG/M2 | HEART RATE: 76 BPM | TEMPERATURE: 97.7 F | WEIGHT: 259 LBS

## 2020-07-20 DIAGNOSIS — I10 ESSENTIAL HYPERTENSION: Primary | ICD-10-CM

## 2020-07-20 DIAGNOSIS — Z12.39 ENCOUNTER FOR BREAST CANCER SCREENING OTHER THAN MAMMOGRAM: ICD-10-CM

## 2020-07-20 DIAGNOSIS — I48.0 PAROXYSMAL ATRIAL FIBRILLATION (HCC): ICD-10-CM

## 2020-07-20 DIAGNOSIS — R07.89 LEFT-SIDED CHEST WALL PAIN: ICD-10-CM

## 2020-07-20 LAB
ALBUMIN SERPL-MCNC: 4.5 G/DL (ref 3.5–5.2)
ALBUMIN/GLOB SERPL: 2.4 G/DL
ALP SERPL-CCNC: 100 U/L (ref 39–117)
ALT SERPL-CCNC: 30 U/L (ref 1–33)
AST SERPL-CCNC: 26 U/L (ref 1–32)
BILIRUB SERPL-MCNC: 0.6 MG/DL (ref 0–1.2)
BUN SERPL-MCNC: 9 MG/DL (ref 8–23)
BUN/CREAT SERPL: 9.5 (ref 7–25)
CALCIUM SERPL-MCNC: 9.3 MG/DL (ref 8.6–10.5)
CHLORIDE SERPL-SCNC: 102 MMOL/L (ref 98–107)
CHOLEST SERPL-MCNC: 221 MG/DL (ref 0–200)
CHOLEST/HDLC SERPL: 4.25 {RATIO}
CO2 SERPL-SCNC: 27.2 MMOL/L (ref 22–29)
CREAT SERPL-MCNC: 0.95 MG/DL (ref 0.57–1)
GLOBULIN SER CALC-MCNC: 1.9 GM/DL
GLUCOSE SERPL-MCNC: 100 MG/DL (ref 65–99)
HDLC SERPL-MCNC: 52 MG/DL (ref 40–60)
LDLC SERPL CALC-MCNC: 136 MG/DL (ref 0–100)
POTASSIUM SERPL-SCNC: 4.7 MMOL/L (ref 3.5–5.2)
PROT SERPL-MCNC: 6.4 G/DL (ref 6–8.5)
SODIUM SERPL-SCNC: 140 MMOL/L (ref 136–145)
TRIGL SERPL-MCNC: 166 MG/DL (ref 0–150)
VLDLC SERPL CALC-MCNC: 33.2 MG/DL

## 2020-07-20 PROCEDURE — 99213 OFFICE O/P EST LOW 20 MIN: CPT | Performed by: INTERNAL MEDICINE

## 2020-07-20 NOTE — PROGRESS NOTES
Assessment and Plan  Helena was seen today for hypertension.    Diagnoses and all orders for this visit:    Essential hypertension  Comments:  doing well, no change.   Orders:  -     Comprehensive Metabolic Panel  -     Lipid Panel With / Chol / HDL Ratio    Paroxysmal atrial fibrillation (CMS/HCC)  Comments:  tolerating PAF better.     Encounter for breast cancer screening other than mammogram  -     Mammo Screening Bilateral With CAD; Future    Left-sided chest wall pain  Comments:  posterior- seems musculoskeletal- will follow, change position while working, etc.      F/U and Patient Instructions    Return for Medicare Wellness.  There are no Patient Instructions on file for this visit.    Subjective    Helena Romero is a 70 y.o. female being seen in our office today for Hypertension     History of the Present Illness  HPI  Here for f/u of BP, etc.  She feels good, no new complaints.  Takes Mucinex daily for chronic congestion.   Has some pain in her back- worse when she's sitting at the computer with arm up but wants to make sure that she doesn't have any congestion in her chest- no symptoms of that.   Dx with trigeminal neuralgia- Dr. Prince thinks that there is an association.  She isn't having pain it's just annoying.  Feels like there is a mask on her face.     Patient History        Significant Past History  The following portions of the patient's history were reviewed and updated as appropriate:PMHroutine: Social history , Allergies, Current Medications, Active Problem List and Health Maintenance              Social History  She  reports that she has never smoked. She has never used smokeless tobacco. She reports that she does not drink alcohol or use drugs.                         Review of Symptoms  Review of Systems   Constitution: Weight gain: just a few lbs.   HENT: Negative.    Cardiovascular: Palpitations: feels she is tolerating the a fib better.   Respiratory: Negative.    Musculoskeletal:  Negative.    Gastrointestinal: Negative.    Genitourinary: Negative.    Psychiatric/Behavioral: Negative.      Objective  Vital Signs         BP Readings from Last 1 Encounters:   07/20/20 132/74     Wt Readings from Last 3 Encounters:   07/20/20 117 kg (259 lb)   02/14/20 116 kg (256 lb 3.2 oz)   01/20/20 116 kg (255 lb)   Body mass index is 43.1 kg/m².          Physical Exam   Physical Exam   Constitutional: No distress.   Cardiovascular: Normal rate and regular rhythm.   Pulmonary/Chest: Effort normal and breath sounds normal.   Musculoskeletal: Edema: trace.   Tender spot L posterior ribs   Skin: Skin is warm and dry.   Psychiatric: She has a normal mood and affect. Her behavior is normal. Judgment and thought content normal.     Data Reviewed    No results found for this or any previous visit (from the past 2016 hour(s)).

## 2020-07-21 DIAGNOSIS — I10 ESSENTIAL HYPERTENSION: Primary | ICD-10-CM

## 2020-07-21 DIAGNOSIS — E78.5 HYPERLIPIDEMIA, UNSPECIFIED HYPERLIPIDEMIA TYPE: ICD-10-CM

## 2020-07-21 RX ORDER — ATORVASTATIN CALCIUM 20 MG/1
20 TABLET, FILM COATED ORAL DAILY
Qty: 90 TABLET | Refills: 1 | Status: SHIPPED | OUTPATIENT
Start: 2020-07-21 | End: 2021-01-21

## 2020-07-26 ENCOUNTER — RESULTS ENCOUNTER (OUTPATIENT)
Dept: INTERNAL MEDICINE | Facility: CLINIC | Age: 70
End: 2020-07-26

## 2020-07-26 DIAGNOSIS — E78.5 HYPERLIPIDEMIA, UNSPECIFIED HYPERLIPIDEMIA TYPE: ICD-10-CM

## 2020-07-26 DIAGNOSIS — I10 ESSENTIAL HYPERTENSION: ICD-10-CM

## 2020-07-29 ENCOUNTER — TELEPHONE (OUTPATIENT)
Dept: INTERNAL MEDICINE | Facility: CLINIC | Age: 70
End: 2020-07-29

## 2020-07-29 DIAGNOSIS — R92.8 ABNORMAL MAMMOGRAM OF LEFT BREAST: Primary | ICD-10-CM

## 2020-07-29 NOTE — TELEPHONE ENCOUNTER
[facility] called and the patient's mammogram came back abnormal and they are going to call and get the patient scheduled for a Left breast diagnostic mammo with an US Left breast.  They would like for us to fax an order to them at [phone].  Thank you

## 2020-08-07 RX ORDER — OLMESARTAN MEDOXOMIL 40 MG/1
40 TABLET ORAL DAILY
Qty: 90 TABLET | Refills: 1 | Status: SHIPPED | OUTPATIENT
Start: 2020-08-07 | End: 2021-02-03

## 2020-08-10 DIAGNOSIS — R92.8 ABNORMAL MAMMOGRAM OF LEFT BREAST: Primary | ICD-10-CM

## 2020-08-17 RX ORDER — OMEPRAZOLE 20 MG/1
20 CAPSULE, DELAYED RELEASE ORAL DAILY
Qty: 90 CAPSULE | Refills: 0 | Status: SHIPPED | OUTPATIENT
Start: 2020-08-17 | End: 2020-11-24

## 2020-09-10 DIAGNOSIS — C50.912 MALIGNANT NEOPLASM OF LEFT BREAST IN FEMALE, ESTROGEN RECEPTOR POSITIVE, UNSPECIFIED SITE OF BREAST (HCC): Primary | ICD-10-CM

## 2020-09-10 DIAGNOSIS — Z17.0 MALIGNANT NEOPLASM OF LEFT BREAST IN FEMALE, ESTROGEN RECEPTOR POSITIVE, UNSPECIFIED SITE OF BREAST (HCC): Primary | ICD-10-CM

## 2020-09-15 ENCOUNTER — TELEPHONE (OUTPATIENT)
Dept: SURGERY | Facility: CLINIC | Age: 70
End: 2020-09-15

## 2020-09-15 NOTE — TELEPHONE ENCOUNTER
"Scheduled New Pt for   9-2-520 arrive 7:30    Pre screened for possible Breast Mri  Good on all questions.    Ht 5'5\"  Wt 260    MAILED PW     Spoke to pt she v/u  Shilpi    "

## 2020-09-16 DIAGNOSIS — C50.112 CANCER OF CENTRAL PORTION OF LEFT BREAST (HCC): Primary | ICD-10-CM

## 2020-09-17 ENCOUNTER — LAB REQUISITION (OUTPATIENT)
Dept: LAB | Facility: HOSPITAL | Age: 70
End: 2020-09-17

## 2020-09-17 DIAGNOSIS — Z00.00 ENCOUNTER FOR GENERAL ADULT MEDICAL EXAMINATION WITHOUT ABNORMAL FINDINGS: ICD-10-CM

## 2020-09-18 ENCOUNTER — TELEPHONE (OUTPATIENT)
Dept: SURGERY | Facility: CLINIC | Age: 70
End: 2020-09-18

## 2020-09-18 NOTE — TELEPHONE ENCOUNTER
Scheduled Bilateral Breast Mri w wo contrast  9-22-20 arrive 7:15       Spoke to pt she v/u  Shilpi

## 2020-09-19 LAB
LAB AP CASE REPORT: NORMAL
LAB AP CLINICAL INFORMATION: NORMAL
LAB AP DIAGNOSIS COMMENT: NORMAL
PATH REPORT.FINAL DX SPEC: NORMAL
PATH REPORT.GROSS SPEC: NORMAL

## 2020-09-21 DIAGNOSIS — M19.91 PRIMARY OSTEOARTHRITIS, UNSPECIFIED SITE: ICD-10-CM

## 2020-09-21 RX ORDER — GABAPENTIN 100 MG/1
300 CAPSULE ORAL NIGHTLY
Qty: 90 CAPSULE | Refills: 1 | Status: SHIPPED | OUTPATIENT
Start: 2020-09-21 | End: 2021-03-23 | Stop reason: SDUPTHER

## 2020-09-22 ENCOUNTER — HOSPITAL ENCOUNTER (OUTPATIENT)
Dept: MRI IMAGING | Facility: HOSPITAL | Age: 70
Discharge: HOME | End: 2020-09-22

## 2020-09-22 ENCOUNTER — TELEPHONE (OUTPATIENT)
Dept: SURGERY | Facility: CLINIC | Age: 70
End: 2020-09-22

## 2020-09-22 DIAGNOSIS — C50.112 CANCER OF CENTRAL PORTION OF LEFT BREAST (HCC): ICD-10-CM

## 2020-09-22 LAB — CREAT BLDA-MCNC: 0.8 MG/DL (ref 0.6–1.3)

## 2020-09-22 PROCEDURE — 82565 ASSAY OF CREATININE: CPT

## 2020-09-22 NOTE — TELEPHONE ENCOUNTER
scheduled for a Breast MRI this morning.  Patient could not go thru with this, she said she has a bowel blockage.    I asked her if we scheduled it later, she said no,she can not tolerant to have this done.    I let provider know.  Shilpi

## 2020-09-25 ENCOUNTER — TRANSCRIBE ORDERS (OUTPATIENT)
Dept: SURGERY | Facility: CLINIC | Age: 70
End: 2020-09-25

## 2020-09-25 ENCOUNTER — OFFICE VISIT (OUTPATIENT)
Dept: SURGERY | Facility: CLINIC | Age: 70
End: 2020-09-25

## 2020-09-25 ENCOUNTER — TELEPHONE (OUTPATIENT)
Dept: SURGERY | Facility: CLINIC | Age: 70
End: 2020-09-25

## 2020-09-25 VITALS
SYSTOLIC BLOOD PRESSURE: 137 MMHG | TEMPERATURE: 98 F | HEIGHT: 65 IN | BODY MASS INDEX: 43.49 KG/M2 | HEART RATE: 69 BPM | WEIGHT: 261 LBS | DIASTOLIC BLOOD PRESSURE: 80 MMHG | OXYGEN SATURATION: 95 %

## 2020-09-25 DIAGNOSIS — Z80.3 FH: BREAST CANCER: ICD-10-CM

## 2020-09-25 DIAGNOSIS — Z80.0 FH: PANCREATIC CANCER: ICD-10-CM

## 2020-09-25 DIAGNOSIS — I48.91 ATRIAL FIBRILLATION, UNSPECIFIED TYPE (HCC): ICD-10-CM

## 2020-09-25 DIAGNOSIS — Z80.8 FAMILY HISTORY OF MELANOMA: ICD-10-CM

## 2020-09-25 DIAGNOSIS — C50.112 CANCER OF CENTRAL PORTION OF LEFT BREAST (HCC): Primary | ICD-10-CM

## 2020-09-25 DIAGNOSIS — E66.01 MORBID (SEVERE) OBESITY DUE TO EXCESS CALORIES (HCC): ICD-10-CM

## 2020-09-25 DIAGNOSIS — Z17.0 MALIGNANT NEOPLASM OF LOWER-OUTER QUADRANT OF LEFT BREAST OF FEMALE, ESTROGEN RECEPTOR POSITIVE (HCC): Primary | ICD-10-CM

## 2020-09-25 DIAGNOSIS — C50.512 MALIGNANT NEOPLASM OF LOWER-OUTER QUADRANT OF LEFT BREAST OF FEMALE, ESTROGEN RECEPTOR POSITIVE (HCC): Primary | ICD-10-CM

## 2020-09-25 DIAGNOSIS — Z80.8 FH: THYROID CANCER: ICD-10-CM

## 2020-09-25 PROCEDURE — 99205 OFFICE O/P NEW HI 60 MIN: CPT | Performed by: SURGERY

## 2020-09-25 NOTE — PROGRESS NOTES
Chief Complaint: Helena Romero is a 70 y.o. female who was seen in consultation at the request of Ally Lagos MD  for newly diagnosed breast cancer    History of Present Illness:  Patient presents with newly diagnosed breast cancer. She noted no new masses, skin changes, nipple discharge, nipple changes prior to her most recent imaging.  Her most recent imaging includes the followin2018  [facility]    Mammo  Helena Romero  BILATERAL SCREENING MAMMO WITH ROBI  Scattered areas of fibroglandular density.  BIRADS: 1, Negative.    2020  [facility]    Mammo  Helena Romeor  MAMMO ROBI SCREENING BILATERAL  Breasts are almost entirely fatty.  Focal asymmetry in the left breast at 6:00 at posterior depth.  BIRADS: 0    08/10/2020  [facility]    Radiology  Helena Romero  LEFT DIAGNOSTIC MAMMO WITH ROBI  LEFT BREAST US  Family history of breast cancer: niece, at age 38.  9 mm high density mass in the posterior one third lower central left breast. This appears to have increased in size and density when compared to prior images.  Left breast US: No sonographic correlate to the mammographic finding.  BIRADS: 4    She had a biopsy on the following day that showed:   2020  [facility]    Pathology  Helena Romero  MAMMO STEREO BX LEFT BREAST  9 gauge biopsy needle 8 core specimens.  A metallic ribbon shaped biopsy tissue marker was placed.  A left mammogram, obtained immediately post core biopsy, documented sampling of the targeted area. There is a 28 mm associated post biopsy hematoma obscuring the biopsied mass.  ADDENDUM:  Pathology invasive mucinous carcinoma and atypical ductal hyperplasia. This is concordant with imaging findings.    2020  [facility]    Marker Analysis  Helena Romero  ER - >95%  OR - >95%  HER2/lina ([facility]):  - Negative (score: 0)  Ki-67 Proliferation Index: 5%  Mucinous carcinoma.  Glandular (Acinar)/ Tubular Differentiation: Score 2  Nuclear Pleormorphism: Score 1  Mitotic Rate: Score  1  Overall Grade: Grade 1  Tumor Size: 7.0 mm  Ductal Carcinoma In Situ (DCIS): Not Identified  Additional findings: ATYPICAL DUCTAL HYPERPLASIA    09/17/2020  [facility]   Pathology  Helena Romero  Consult Slides for Review:  1. Left Breast, Lower Central, Posterior One:   A. INVASIVE MUCINOUS CARCINOMA, Well-differentiated; Grade I/III (tubule score =2, nuclear score =1, mitoses score =1), measuring at least 7 mm.  B. Associated atypical ductal hyperplasia.  C. Negative for lymphovascular space invasion in this limited sample.  D. Immunohistochemical studies show:  a. ER - 100%  b. DE - 100%  c. HER2 - Negative (0)  d. Ki-67 - 5%      She has not had a breast biopsy in the past.had a left breast core biopsy in 2016 it was reportedly benign.  Has her uterus and ovaries, is postmenopausal, and takes nor hormones.  Her family history includes the following:   Melanoma Sister age 60.  Thyroid cancer same Sister age 60.  Niece, daughter of the above sister, triple negative breast cancer metastatic age 38.  Paternal aunt pancreas cancer  Mother cervical cancer.      She tells me that she has gained 70 pounds over 2 years.  She is here for evaluation.    Review of Systems:  Review of Systems   Respiratory: Positive for cough and shortness of breath (ONLY WITH EXERTION ).    Cardiovascular: Positive for palpitations.   Gastrointestinal: Positive for abdominal pain and diarrhea.   Musculoskeletal: Positive for arthralgias and back pain.   Hematological: Bruises/bleeds easily (ON XARELTO DAILY ).   Psychiatric/Behavioral: Positive for sleep disturbance. The patient is nervous/anxious.    All other systems reviewed and are negative.       Past Medical and Surgical History:  Breast Biopsy History:  Patient had not had a breast biopsy prior to her cancer diagnosis.  Breast Cancer HIstory:  Patient does not have a past medical history of breast cancer.  Breast Operations, and year:  NONE   Obstetric/Gynecologic  History:  Age menstrual periods began: 12  Patient is postmenopausal due to removal of her uterus in the following year: 2000; AGE 50.    Number of pregnancies: 2  Number of live births: 2  Number of abortions or miscarriages: 0  Age of delivery of first child: 21  Patient did not breast feed.  Length of time taking birth control pills: 5 YRS.   Patient took hormone replacement during the following dates: 2652-3275 MOST RECENT PREMARIN CREAM.   PATIENT HAS NOT TAKEN RECENTLY.   PATIENT HAS UTERUS AND OVARIES.     Past Surgical History:   Procedure Laterality Date   • BREAST BIOPSY Left 2020    MALIGNANT   • CHOLECYSTECTOMY     • COLONOSCOPY  approx 2011    normal per patient-repeat 10 years   • DILATION AND CURETTAGE, DIAGNOSTIC / THERAPEUTIC     • TONSILLECTOMY     • TUBAL ABDOMINAL LIGATION       Past Medical History:   Diagnosis Date   • Acute bronchitis    • Allergic    • Anxiety    • Asthma    • Borderline hyperglycemia    • Cat bite    • Depression    • Diarrhea    • GERD (gastroesophageal reflux disease)    • Hypertension    • Hypocalcemia    • Insomnia    • Low back pain    • Low back pain, episodic    • Need for diphtheria-tetanus-pertussis (Tdap) vaccine    • Neuromuscular disorder (CMS/HCC)    • Obesity    • Osteoarthritis, generalized    • Panic attack    • Pneumonia     x 3   • Seasonal allergies    • Sleep disturbances    • Trochanteric bursitis    • Vaginal candidiasis    • Yeast infection        Prior Hospitalizations, other than for surgery or childbirth, and year:  PARTIALLY BLOCKED BOWEL 2013  [facility] 2020    Social History     Socioeconomic History   • Marital status:      Spouse name: Not on file   • Number of children: Not on file   • Years of education: Not on file   • Highest education level: Not on file   Tobacco Use   • Smoking status: Never Smoker   • Smokeless tobacco: Never Used   Substance and Sexual Activity   • Alcohol use: No     Frequency: Never   • Drug use: No     Patient is  ".  Patient is employed full time with the following occupation: MEDICAL BILLING  Patient drinks 3 servings of caffeine per day.    Family History:  Family History   Problem Relation Age of Onset   • Pancreatic cancer Paternal Aunt    • Cervical cancer Mother 41   • Melanoma Sister 60   • Thyroid cancer Sister 60   • Breast cancer Niece 38        TRIPLE -       Vital Signs:  /80   Pulse 69   Temp 98 °F (36.7 °C)   Ht 165.1 cm (65\")   Wt 118 kg (261 lb)   LMP  (LMP Unknown)   SpO2 95%   Breastfeeding No   BMI 43.43 kg/m²      Medications:    Current Outpatient Medications:   •  atorvastatin (LIPITOR) 20 MG tablet, Take 1 tablet by mouth Daily., Disp: 90 tablet, Rfl: 1  •  busPIRone (BUSPAR) 15 MG tablet, TAKE 1 TABLET BY MOUTH TWICE DAILY, Disp: 180 tablet, Rfl: 1  •  clotrimazole (LOTRIMIN) 1 % external solution, Apply  topically to the appropriate area as directed 2 (Two) Times a Day., Disp: , Rfl:   •  fexofenadine (ALLEGRA) 180 MG tablet, Take 180 mg by mouth Daily., Disp: , Rfl:   •  gabapentin (NEURONTIN) 100 MG capsule, TAKE 3 CAPSULES BY MOUTH EVERY NIGHT, Disp: 90 capsule, Rfl: 1  •  gabapentin (NEURONTIN) 300 MG capsule, Take 1 capsule by mouth Every Night., Disp: 90 capsule, Rfl: 1  •  metoprolol succinate XL (TOPROL-XL) 25 MG 24 hr tablet, Take 1 tablet by mouth Daily., Disp: 90 tablet, Rfl: 3  •  olmesartan (BENICAR) 40 MG tablet, TAKE 1 TABLET BY MOUTH DAILY, Disp: 90 tablet, Rfl: 1  •  omeprazole (priLOSEC) 20 MG capsule, TAKE 1 CAPSULE BY MOUTH DAILY, Disp: 90 capsule, Rfl: 0  •  rivaroxaban (XARELTO) 20 MG tablet, Take 20 mg by mouth Daily., Disp: , Rfl:   •  conjugated estrogens (PREMARIN) 0.625 MG/GM vaginal cream, Insert  into the vagina Daily., Disp: 30 g, Rfl: 5     Allergies:  Allergies   Allergen Reactions   • Lisinopril Hives   • Codeine Nausea Only       Physical Examination:  /80   Pulse 69   Temp 98 °F (36.7 °C)   Ht 165.1 cm (65\")   Wt 118 kg (261 lb)   LMP  " (LMP Unknown)   SpO2 95%   Breastfeeding No   BMI 43.43 kg/m²   General Appearance:  Patient is in no distress.  She is well kept and has an morbidly obese build.   Psychiatric:  Patient with appropriate mood and affect. Alert and oriented to self, time, and place.    Breast, RIGHT:  medium sized, 44-46D, symmetric with the contralateral side.  Breast skin is without erythema, edema, rashes.  There are no visible abnormalities upon inspection during the arm-raising maneuver or with hands on hips in the sitting position. There is no nipple retraction, discharge or nipple/areolar skin changes.There are no masses palpable in the sitting or supine positions.    Breast, LEFT:  medium sized, 44-46D,  symmetric with the contralateral side.  Breast skin is without erythema, edema, rashes.  There are no visible abnormalities upon inspection during the arm-raising maneuver or with hands on hips in the sitting position. There is no nipple retraction, discharge or nipple/areolar skin changes.There are no masses palpable in the sitting or supine positions.  Left breast there is a mammotomy with minor associated ecchymoses from her recent stereotactic biopsy.  The hematoma seen on bedside ultrasound is immediately underlying the mammotomy at the 6:00, 4.5 cm from the nipple location.  This is the site of the known malignancy.    Lymphatic:  There is no axillary, cervical, infraclavicular, or supraclavicular adenopathy bilaterally.  Eyes:  Pupils are round and reactive to light.  Cardiovascular:  Heart rate and rhythm are regular.  Respiratory:  Lungs are clear bilaterally with no crackles or wheezes in any lung field.  Gastrointestinal:  Abdomen is soft, nondistended, and nontender.     Musculoskeletal:  Good strength in all 4 extremities.   There is good range of motion in both shoulders.    Skin:  No new skin lesions or rashes on the skin excluding the breast (see breast exam above).        Imagin[facility]   Mammo  Helena Romero  IMPRESSION:  Negative screening digital mammogram.  BIRADS: 1, Negative.    02/01/2016  [facility]    Radiology  Helena Romero  DIAGNOSTIC BILATERAL MAMMO  HISTORY: intermittent, recurrent left breast pain following trauma one year ago.  Right breast new circumscribed, lobulated 4-5 mm nodule at 7:00, 8 cm back from the nipple.  Symptomatic area indicated by the patient anterior aspect of the left lower outer quadrant is clearly marked. No underlying breast pathology is seen.  BILATERAL BREAST US  Right 7:00 in the left breast 7:00 is performed. Corresponding to the new right-sided mammographic nodule is an ovoid solid or complex cystic 3.6 x 2.8 x 4.8 mm nodule at 7:30, 7 cm back from the nipple. This is considered suspicious.  BIRADS: 4    02/15/2016  Joseph Romero  Exams:  1. Ultrasound guided biopsy, right breast.  2. Post procedure digital mammogram, right breast.  US-Guided BX  Right breast 7:30 position 7 cm from the nipple.  Coil shaped clip. A [14] gauge achieve. 5 passes were performed.  Post-Procedure Digital Mammo  Biopsy clip to be in satisfactory position.    02/15/2019  [facility]    Pathology  Helena Romero  Right Breast, Core BX at 7:30:  - Benign Mammary Tissue, Duct Dilatation and Columnar Cell Changes.    02/16/2016  [facility]    Pathology  Helena Romero  Amended Report:  This is radiology-pathology concordant.  Recommend return to bilateral annual screening mammography.    01/29/2018  [facility]    Mammo  Helena Romero  BILATERAL SCREENING MAMMO WITH ROBI  Scattered areas of fibroglandular density.  BIRADS: 1, Negative.    07/28/2020  [facility]    Mammo  Helena Romero  MAMMO ROBI SCREENING BILATERAL  Breasts are almost entirely fatty.  Focal asymmetry in the left breast at 6:00 at posterior depth.  BIRADS: 0    08/10/2020  [facility]    Radiology  Helena Romero  LEFT DIAGNOSTIC MAMMO WITH ROBI  LEFT BREAST US  Family history of breast cancer:  niece, at age 38.  9 mm high density mass in the posterior one third lower central left breast. This appears to have increased in size and density when compared to prior images.  Left breast US: No sonographic correlate to the mammographic finding.  BIRADS: 4    Pathology:  09/04/2020  [facility]    Pathology  Helena Romero  MAMMO STEREO BX LEFT BREAST  9 gauge biopsy needle 8 core specimens.  A metallic ribbon shaped biopsy tissue marker was placed.  A left mammogram, obtained immediately post core biopsy, documented sampling of the targeted area. There is a 28 mm associated post biopsy hematoma obscuring the biopsied mass.  ADDENDUM:  Pathology invasive mucinous carcinoma and atypical ductal hyperplasia. This is concordant with imaging findings.    09/04/2020  [facility]    Marker Analysis  Helena Romero  ER - >95%  FL - >95%  HER2/lina ([facility]):  - Negative (score: 0)  Ki-67 Proliferation Index: 5%  Mucinous carcinoma.  Glandular (Acinar)/ Tubular Differentiation: Score 2  Nuclear Pleormorphism: Score 1  Mitotic Rate: Score 1  Overall Grade: Grade 1  Tumor Size: 7.0 mm  Ductal Carcinoma In Situ (DCIS): Not Identified  Additional findings: ATYPICAL DUCTAL HYPERPLASIA    09/17/2020  [facility]   Pathology  Helena Romero  Consult Slides for Review:  2. Left Breast, Lower Central, Posterior One:   E. INVASIVE MUCINOUS CARCINOMA, Well-differentiated; Grade I/III (tubule score =2, nuclear score =1, mitoses score =1), measuring at least 7 mm.  F. Associated atypical ductal hyperplasia.  G. Negative for lymphovascular space invasion in this limited sample.  H. Immunohistochemical studies show:  a. ER - 100%  b. FL - 100%  c. HER2 - Negative (0)  d. Ki-67 - 5%    Procedures:      Assessment:   Diagnosis Plan   1. Malignant neoplasm of lower-outer quadrant of left breast of female, estrogen receptor positive (CMS/HCC)     2. Morbid (severe) obesity due to excess calories (CMS/HCC)     3. Atrial fibrillation, unspecified type  (CMS/HCC)     4. FH: breast cancer     5. Family history of melanoma     6. FH: thyroid cancer     7. FH: pancreatic cancer     1-  Left breast 6:00, 4.5 cm from the nipple location of postbiopsy hematoma.  Prebiopsy imaging 9 mm on mammogram, no ultrasound finding.  Patient was unable to tolerate MRI.  7 mm largest measurement in a core.  Invasive mucinous carcinoma, low-grade, 2, 1, 1, associated atypical duct hyperplasia.  No lymphovascular invasion.  Estrogen 100 progesterone 100 HER-2/lina 0 with a Ki-67 of 5%.  Patient did have internal path review.  Clinical stage T1b N0 stage Ia.      2-  BMI 43    3-  Atrial fibrillatinon cassandra, Dr Prince  In sinus rhythm today    4-7  Melanoma Sister age 60.  Thyroid cancer same Sister age 60.  Niece, daughter of the above sister, triple negative breast cancer metastatic age 38.  Paternal aunt pancreas cancer  Mother cervical cancer.    Plan:  The patient goes by Juliana.  Juliana and I reviewed her history, imaging, imaging reports, examination and family history together today.  Discussed the favorable nature of low-grade heavily hormone fed mucinous carcinomas.    We reviewed the difference in systemic therapy versus locoregional. She knows that she will be seeing a medical oncologist in the adjuvant setting. We discussed that for early stage breast cancer, there are 2 options for locoregional treatment that have equivalent survival: total mastectomy versus lumpectomy and radiation, either with sentinel node biopsy.   She is interested in breast conservation.   We discussed the option of oncoplastic closure with contralateral mastopexy, and she is not interested in this.  We discussed her having a plastic surgical consultation in the postoperative period as an alternative option, if she finds that she is dissatisfied with her symmetry. We did discuss that most plastic surgeons will not operate on the irradiated breast, but could perform a reduction on the contralateral  side for size symmetry down the road.     We discussed the following procedure:  Left breast charley localized lumpectomy and left axillary sentinel lymph node biopsy.    She understands the risks of lumpectomy including bleeding, infection, seroma and 25% chance of positive margins. She understands the risks of  sentinel node biopsy, including 7% chance of lymphedema, injury to nerves or vessels in the axilla,  seroma. We discussed that we would review her pathology from her sentinel node procedure in consideration of a complete axillary dissection, after her procedure.   NCCN guidelines have been followed.  We fitted her for a postoperative bra, gave her EMLA cream and tegederm for her sentinel node procedure, and had her to see our nurse navigator. She will receive a recommendation for radiation after surgery.    We also discussed her family history and have sent off an [facility] common hereditary panel add on breast today.  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  Both she and her  were and they lost 1 of their 2 sons at age 5 to cystic fibrosis.  She would like to have her genetic report back prior to surgery.  Her niece who had a triple negative breast cancer at 38 I did recommend that she pursue genetic testing.      Due to the hematoma we will wait approximately 4 weeks for surgery.      We discussed the importance and significance of her obesity today.  We discussed that a critical part of her medical management will be weight loss.  We discussed the nature of aromatase inhibitors and the decreased efficacy in women who are obese.  We discussed having her talk with a nutritionist and she was happy to do this.  She tells me that she has gained 70 pounds over 2 years.  We discussed that the aromatase inhibitors can make this weight loss aggravated.  I will also have her see physical therapy for bioimpedance measurements of her arms preoperatively due to her obesity.  She request to see radiation  oncologist in [place] so we will refer her to see Dr. Aranda postoperatively.    I have asked her to hold her Xarelto for 3 days preoperatively.  We will make sure that this is okay with .  We discussed that she could start her Xarelto back once I have called her with her pathology report approximately 3 days postoperatively.        Inés Jones MD        We have spent 65 minutes in visit today, 45 in face to face .      Next Appointment:  No follow-ups on file.      EMR Dragon/transcription disclaimer:    Much of this encounter note is an electronic transcription/translocation of spoken language to printed text.  The electronic translation of spoken language may permit erroneous, or at times, nonsensical words or phrases to be inadvertently transcribed.  Although I have reviewed the note from such areas, some may still exist.

## 2020-09-25 NOTE — TELEPHONE ENCOUNTER
Message to Dr. Matthew Sun's office  [phone]  May stop the Xarelto 3 days preoperatively for surgery and restart 3 days after. Patient off for approx 6 days.

## 2020-09-28 ENCOUNTER — TELEPHONE (OUTPATIENT)
Dept: SURGERY | Facility: CLINIC | Age: 70
End: 2020-09-28

## 2020-09-28 ENCOUNTER — TELEPHONE (OUTPATIENT)
Dept: OTHER | Facility: HOSPITAL | Age: 70
End: 2020-09-28

## 2020-09-28 DIAGNOSIS — C50.512 MALIGNANT NEOPLASM OF LOWER-OUTER QUADRANT OF LEFT BREAST OF FEMALE, ESTROGEN RECEPTOR POSITIVE (HCC): Primary | ICD-10-CM

## 2020-09-28 DIAGNOSIS — Z17.0 MALIGNANT NEOPLASM OF LOWER-OUTER QUADRANT OF LEFT BREAST OF FEMALE, ESTROGEN RECEPTOR POSITIVE (HCC): Primary | ICD-10-CM

## 2020-09-28 RX ORDER — POLYETHYLENE GLYCOL 3350 17 G/17G
17 POWDER, FOR SOLUTION ORAL DAILY
Qty: 119 G | Refills: 0 | Status: SHIPPED | OUTPATIENT
Start: 2020-09-28

## 2020-09-28 RX ORDER — ONDANSETRON 4 MG/1
4 TABLET, FILM COATED ORAL EVERY 8 HOURS PRN
Qty: 10 TABLET | Refills: 1 | Status: SHIPPED | OUTPATIENT
Start: 2020-09-28 | End: 2021-01-25

## 2020-09-28 RX ORDER — HYDROCODONE BITARTRATE AND ACETAMINOPHEN 5; 325 MG/1; MG/1
TABLET ORAL
Qty: 20 TABLET | Refills: 0 | Status: SHIPPED | OUTPATIENT
Start: 2020-09-28 | End: 2021-01-25

## 2020-09-28 NOTE — TELEPHONE ENCOUNTER
Referral received from Dr. Jones's office. I called Ms. Romero and introduced myself and navigational services. She states the plan is to have a lumpectomy with a Chandrika. She verbalizes her primary concern is pain related to the Chandrika procedure and we discussed that briefly. She has a good understanding of her pathology and treatment options and  is comfortable with her plan of care.     She stated she has great support at home and a wonderful [employer] family to help with needs. We discussed that we have counseling services available through our [facility] if the need presents itself in the future. She was thankful for the information.     We discussed integrative therapies and other services at the [facility]. She verbalized interest in receiving a navigation folder outlining services. I verified her mailing address and will send out a navigation folder with the following information:     [facility] Support [facility],  Sh[facility] Our Stories Breast Cancer Support Group, Cancer and Restorative Exercise (CARE), [facility] Exercise program, Together for Breast Cancer Survival,  For Women Facing Breast Cancer, Bioimpedance, [facility], Massage Therapy, Reiki Therapy, [employer] [place], Cancer Nutrition, and Survivorship Clinic.     She verbalized appreciation for navigational services and she has my contact information and will call with any questions that arise.

## 2020-09-28 NOTE — TELEPHONE ENCOUNTER
Dr. Prince okay to hold Xarelto for surgery.     Called patient and confirmed   Hold Xarelto 3 days pre op.     Emla cream 30 g tube no refills   Apply topically once for one dose to affected nipple outer edge of affected nipple and cover day of surgery before leaving home.     I let her know Dr. Jones has called in post medications to her pharmacy.

## 2020-09-29 ENCOUNTER — PREP FOR SURGERY (OUTPATIENT)
Dept: OTHER | Facility: HOSPITAL | Age: 70
End: 2020-09-29

## 2020-09-29 DIAGNOSIS — C50.512 MALIGNANT NEOPLASM OF LOWER-OUTER QUADRANT OF LEFT BREAST OF FEMALE, ESTROGEN RECEPTOR POSITIVE (HCC): Primary | ICD-10-CM

## 2020-09-29 DIAGNOSIS — Z17.0 MALIGNANT NEOPLASM OF LOWER-OUTER QUADRANT OF LEFT BREAST OF FEMALE, ESTROGEN RECEPTOR POSITIVE (HCC): Primary | ICD-10-CM

## 2020-09-29 RX ORDER — SODIUM CHLORIDE, SODIUM LACTATE, POTASSIUM CHLORIDE, CALCIUM CHLORIDE 600; 310; 30; 20 MG/100ML; MG/100ML; MG/100ML; MG/100ML
100 INJECTION, SOLUTION INTRAVENOUS CONTINUOUS
Status: CANCELLED | OUTPATIENT
Start: 2020-11-05

## 2020-09-29 RX ORDER — CEFAZOLIN SODIUM 2 G/100ML
2 INJECTION, SOLUTION INTRAVENOUS ONCE
Status: CANCELLED | OUTPATIENT
Start: 2020-11-05 | End: 2020-09-29

## 2020-09-29 RX ORDER — DIAZEPAM 5 MG/1
5 TABLET ORAL ONCE
Status: CANCELLED | OUTPATIENT
Start: 2020-11-05 | End: 2020-09-29

## 2020-09-30 ENCOUNTER — TELEPHONE (OUTPATIENT)
Dept: SURGERY | Facility: CLINIC | Age: 70
End: 2020-09-30

## 2020-09-30 PROBLEM — Z17.0 MALIGNANT NEOPLASM OF LOWER-OUTER QUADRANT OF LEFT BREAST OF FEMALE, ESTROGEN RECEPTOR POSITIVE: Status: ACTIVE | Noted: 2020-09-30

## 2020-09-30 PROBLEM — C50.512 MALIGNANT NEOPLASM OF LOWER-OUTER QUADRANT OF LEFT BREAST OF FEMALE, ESTROGEN RECEPTOR POSITIVE (HCC): Status: ACTIVE | Noted: 2020-09-30

## 2020-09-30 NOTE — TELEPHONE ENCOUNTER
Scheduled Left Breast OTILIA Placement  Arrive 9:30   [facility] 10-26-20    Scheduled Surgery Main OR 11-5-20  (patient's request)    Arrive      10:00  [facility]            12:00  Surgery   1:00  [facility] 10-26-20 @ 12:00    Post Op 11-17-20 arrive 12:45    Spoke to pt dayanna v/ayla Dobbins

## 2020-10-06 ENCOUNTER — CLINICAL SUPPORT (OUTPATIENT)
Dept: OTHER | Facility: HOSPITAL | Age: 70
End: 2020-10-06

## 2020-10-06 VITALS — WEIGHT: 261 LBS | BODY MASS INDEX: 43.49 KG/M2 | HEIGHT: 65 IN

## 2020-10-06 NOTE — PROGRESS NOTES
"Outpatient Oncology Nutrition Assessment      Patient Name:  Helena Romero  YOB: 1950  MRN: [id number]      Assessment Date:  10/6/2020    Comments:  Met with patient today by phone per her request. She lives in [place], [place].   She is working from home and is mostly sedentary. She is trying to make healthier choices at meals and snacks but does not eat a variety of vegetables. She is ordering groceries online to . She cooks at home.  She received the packet of info I sent in the mail. I suggested that she work on increasing the variety of vegetables she consumes, aiming for a variety of colors. Discussed portion control, eating less processed foods in general, being more active. I suggested that she track her intake using my fitness pal or something similar for increased accountability. I recommended 2373-6397 calories per day for 1-2 lb weight loss per week.   She reports that she hasn't been eating much and has been more tired. I encouraged her to move as much as possible during the day while she is working at home to help combat fatigue.   Encouraged her to call with any questions. I referred the patient to the dietitian at [facility] as another resource as she will received radiation there.       Reason for Assessment     Row Name 10/06/20 1400          Reason for Assessment    Reason For Assessment  diagnosis/disease state;physician consult     Diagnosis  cancer diagnosis/related complications breast cancer     Identified At Risk by Screening Criteria  need for education           Anthropometrics     Row Name 10/06/20 1400          Anthropometrics    Height  165.1 cm (65\")     Weight  118 kg (261 lb)        Ideal Body Weight (IBW)    Ideal Body Weight (IBW) (kg)  57.29     % Ideal Body Weight  206.65        Usual Body Weight (UBW)    Weight Gain  unintentional reports that she has gained 70 lb in the last two years        Body Mass Index (BMI)    BMI (kg/m2)  43.52     " "    Labs/Tests/Procedures/Meds     Row Name 10/06/20 1400          Diagnostic Tests/Procedures    Diagnostic Test/Procedures Comments  lumpectomy 11/5        Medications    Pertinent Medications Comments  prilosec, zofran, miralax, statin           Estimated/Assessed Needs     Row Name 10/06/20 1400          Calculation Measurements    Weight Used For Calculations  118 kg (261 lb)     Height  165.1 cm (65\")        Estimated/Assessed Needs    Additional Documentation  Fluid Requirements (Group);KCAL/KG (Group);Protein Requirements (Group)        KCAL/KG    KCAL/KG  15 Kcal/Kg (kcal)     15 Kcal/Kg (kcal)  1775.835     18 Kcal/Kg (kcal)  --        Protein Requirements    Weight Used For Protein Calculations  118 kg (261 lb)     Est Protein Requirement Amount (gms/kg)  0.8 gm protein     Estimated Protein Requirements (gms/day)  94.71        Fluid Requirements    Fluid Requirements (mL/day)  1775     Estimated Fluid Requirement Method  RDA Method     RDA Method (mL)  1775                   Problem/Interventions:    Information deficit related to breast cancer nutrition and weight management           Intervention Goal     Row Name 10/06/20 1400          Intervention Goal    General  Provide information regarding MNT for treatment/condition     Weight  Appropriate weight loss         Nutrition Intervention     Row Name 10/06/20 1400          Nutrition Intervention    RD/Tech Action  Follow Tx progress           Education/Evaluation     Row Name 10/06/20 1400          Education    Education  Provided education regarding;Education topics;Advised regarding habits/behavior     Provided education regarding  Key food habit change;Medical diagnosis;Preventative health     Education Topics  Gradual weight loss     Advised Regarding Habits/Behavior  Activity;Appropriate beverage;Appropriate portions;Eating out;Eating pattern;Food choices;Food prep;Food shopping;Label reading;Meal planning;Self monitor        Monitor/Evaluation "    Education Follow-up  [employer] PRN           Electronically signed by:  Angelika Melendez RD, LD  10/06/20 14:21 EDT

## 2020-10-18 ENCOUNTER — TELEPHONE (OUTPATIENT)
Dept: SURGERY | Facility: CLINIC | Age: 70
End: 2020-10-18

## 2020-10-18 NOTE — TELEPHONE ENCOUNTER
Gabriel October 10, 2020 common hereditary cancer panel returned as negative for mutation.  We will let her know

## 2020-10-19 ENCOUNTER — TELEPHONE (OUTPATIENT)
Dept: SURGERY | Facility: CLINIC | Age: 70
End: 2020-10-19

## 2020-10-21 ENCOUNTER — TRANSCRIBE ORDERS (OUTPATIENT)
Dept: PREADMISSION TESTING | Facility: HOSPITAL | Age: 70
End: 2020-10-21

## 2020-10-21 DIAGNOSIS — Z01.818 OTHER SPECIFIED PRE-OPERATIVE EXAMINATION: Primary | ICD-10-CM

## 2020-10-22 ENCOUNTER — TRANSCRIBE ORDERS (OUTPATIENT)
Dept: SURGERY | Facility: CLINIC | Age: 70
End: 2020-10-22

## 2020-10-22 ENCOUNTER — TELEPHONE (OUTPATIENT)
Dept: SURGERY | Facility: CLINIC | Age: 70
End: 2020-10-22

## 2020-10-22 DIAGNOSIS — C50.512 MALIGNANT NEOPLASM OF LOWER-OUTER QUADRANT OF LEFT BREAST OF FEMALE, ESTROGEN RECEPTOR POSITIVE (HCC): Primary | ICD-10-CM

## 2020-10-22 DIAGNOSIS — Z17.0 MALIGNANT NEOPLASM OF LOWER-OUTER QUADRANT OF LEFT BREAST OF FEMALE, ESTROGEN RECEPTOR POSITIVE (HCC): Primary | ICD-10-CM

## 2020-10-22 NOTE — TELEPHONE ENCOUNTER
Cony requested order for MMG guided Chandrika rather than US guided Chandrika. I let Shilpi know.

## 2020-10-26 ENCOUNTER — HOSPITAL ENCOUNTER (OUTPATIENT)
Dept: ULTRASOUND IMAGING | Facility: HOSPITAL | Age: 70
Discharge: HOME | End: 2020-10-26

## 2020-10-26 ENCOUNTER — HOSPITAL ENCOUNTER (OUTPATIENT)
Dept: MAMMOGRAPHY | Facility: HOSPITAL | Age: 70
Discharge: HOME | End: 2020-10-26

## 2020-10-26 ENCOUNTER — HOSPITAL ENCOUNTER (OUTPATIENT)
Dept: GENERAL RADIOLOGY | Facility: HOSPITAL | Age: 70
Discharge: HOME | End: 2020-10-26

## 2020-10-26 ENCOUNTER — APPOINTMENT (OUTPATIENT)
Dept: PREADMISSION TESTING | Facility: HOSPITAL | Age: 70
End: 2020-10-26

## 2020-10-26 VITALS
RESPIRATION RATE: 16 BRPM | BODY MASS INDEX: 50.02 KG/M2 | HEIGHT: 64 IN | WEIGHT: 293 LBS | HEART RATE: 64 BPM | OXYGEN SATURATION: 98 % | SYSTOLIC BLOOD PRESSURE: 138 MMHG | TEMPERATURE: 97.9 F | DIASTOLIC BLOOD PRESSURE: 81 MMHG

## 2020-10-26 VITALS
WEIGHT: 260.14 LBS | HEIGHT: 65 IN | BODY MASS INDEX: 43.34 KG/M2 | HEART RATE: 64 BPM | TEMPERATURE: 97.4 F | SYSTOLIC BLOOD PRESSURE: 124 MMHG | OXYGEN SATURATION: 97 % | DIASTOLIC BLOOD PRESSURE: 78 MMHG | RESPIRATION RATE: 16 BRPM

## 2020-10-26 DIAGNOSIS — C50.512 MALIGNANT NEOPLASM OF LOWER-OUTER QUADRANT OF LEFT BREAST OF FEMALE, ESTROGEN RECEPTOR POSITIVE (HCC): ICD-10-CM

## 2020-10-26 DIAGNOSIS — Z17.0 MALIGNANT NEOPLASM OF LOWER-OUTER QUADRANT OF LEFT BREAST OF FEMALE, ESTROGEN RECEPTOR POSITIVE (HCC): ICD-10-CM

## 2020-10-26 DIAGNOSIS — C50.112 CANCER OF CENTRAL PORTION OF LEFT BREAST (HCC): ICD-10-CM

## 2020-10-26 LAB
ALBUMIN SERPL-MCNC: 4.4 G/DL (ref 3.5–5.2)
ALBUMIN/GLOB SERPL: 1.9 G/DL
ALP SERPL-CCNC: 115 U/L (ref 39–117)
ALT SERPL W P-5'-P-CCNC: 29 U/L (ref 1–33)
ANION GAP SERPL CALCULATED.3IONS-SCNC: 7.1 MMOL/L (ref 5–15)
AST SERPL-CCNC: 23 U/L (ref 1–32)
BILIRUB SERPL-MCNC: 0.5 MG/DL (ref 0–1.2)
BUN SERPL-MCNC: 6 MG/DL (ref 8–23)
BUN/CREAT SERPL: 7.1 (ref 7–25)
CALCIUM SPEC-SCNC: 9.1 MG/DL (ref 8.6–10.5)
CHLORIDE SERPL-SCNC: 104 MMOL/L (ref 98–107)
CO2 SERPL-SCNC: 27.9 MMOL/L (ref 22–29)
CREAT SERPL-MCNC: 0.85 MG/DL (ref 0.57–1)
DEPRECATED RDW RBC AUTO: 40.5 FL (ref 37–54)
ERYTHROCYTE [DISTWIDTH] IN BLOOD BY AUTOMATED COUNT: 12.9 % (ref 12.3–15.4)
GFR SERPL CREATININE-BSD FRML MDRD: 66 ML/MIN/1.73
GLOBULIN UR ELPH-MCNC: 2.3 GM/DL
GLUCOSE SERPL-MCNC: 95 MG/DL (ref 65–99)
HCT VFR BLD AUTO: 38.6 % (ref 34–46.6)
HGB BLD-MCNC: 13 G/DL (ref 12–15.9)
MCH RBC QN AUTO: 29.1 PG (ref 26.6–33)
MCHC RBC AUTO-ENTMCNC: 33.7 G/DL (ref 31.5–35.7)
MCV RBC AUTO: 86.4 FL (ref 79–97)
PLATELET # BLD AUTO: 231 10*3/MM3 (ref 140–450)
PMV BLD AUTO: 10.9 FL (ref 6–12)
POTASSIUM SERPL-SCNC: 3.5 MMOL/L (ref 3.5–5.2)
PROT SERPL-MCNC: 6.7 G/DL (ref 6–8.5)
RBC # BLD AUTO: 4.47 10*6/MM3 (ref 3.77–5.28)
SODIUM SERPL-SCNC: 139 MMOL/L (ref 136–145)
WBC # BLD AUTO: 7.32 10*3/MM3 (ref 3.4–10.8)

## 2020-10-26 PROCEDURE — 93005 ELECTROCARDIOGRAM TRACING: CPT

## 2020-10-26 PROCEDURE — 71046 X-RAY EXAM CHEST 2 VIEWS: CPT

## 2020-10-26 PROCEDURE — 36415 COLL VENOUS BLD VENIPUNCTURE: CPT

## 2020-10-26 PROCEDURE — 77065 DX MAMMO INCL CAD UNI: CPT

## 2020-10-26 PROCEDURE — 80053 COMPREHEN METABOLIC PANEL: CPT | Performed by: SURGERY

## 2020-10-26 PROCEDURE — 93010 ELECTROCARDIOGRAM REPORT: CPT | Performed by: INTERNAL MEDICINE

## 2020-10-26 PROCEDURE — 85027 COMPLETE CBC AUTOMATED: CPT | Performed by: SURGERY

## 2020-10-26 PROCEDURE — 25010000003 LIDOCAINE 1 % SOLUTION: Performed by: SURGERY

## 2020-10-26 RX ORDER — ALBUTEROL SULFATE 90 UG/1
2 AEROSOL, METERED RESPIRATORY (INHALATION) EVERY 4 HOURS PRN
COMMUNITY
End: 2021-01-25

## 2020-10-26 RX ORDER — LIDOCAINE HYDROCHLORIDE 10 MG/ML
10 INJECTION, SOLUTION INFILTRATION; PERINEURAL ONCE
Status: COMPLETED | OUTPATIENT
Start: 2020-10-26 | End: 2020-10-26

## 2020-10-26 RX ADMIN — LIDOCAINE HYDROCHLORIDE 10 ML: 10 INJECTION, SOLUTION INFILTRATION; PERINEURAL at 11:01

## 2020-10-26 NOTE — NURSING NOTE
Patient returned from OTILIA deployment procedure. Insertion site to left lower breast clear with steri strips dry and intact over site. Dr. Hassan's location markings present and covered by Tegaderm. Patient aware this must remain in place until surgery. Ice pack with protective covering applied to insertion site. Discharge instructions explained with understanding voiced by patient. Copy to patient. To home via private vehicle.

## 2020-10-29 ENCOUNTER — TELEPHONE (OUTPATIENT)
Dept: SURGERY | Facility: CLINIC | Age: 70
End: 2020-10-29

## 2020-11-03 ENCOUNTER — APPOINTMENT (OUTPATIENT)
Dept: LAB | Facility: HOSPITAL | Age: 70
End: 2020-11-03

## 2020-11-03 ENCOUNTER — LAB (OUTPATIENT)
Dept: LAB | Facility: HOSPITAL | Age: 70
End: 2020-11-03

## 2020-11-03 DIAGNOSIS — Z01.818 OTHER SPECIFIED PRE-OPERATIVE EXAMINATION: ICD-10-CM

## 2020-11-03 PROCEDURE — U0004 COV-19 TEST NON-CDC HGH THRU: HCPCS | Performed by: INTERNAL MEDICINE

## 2020-11-03 PROCEDURE — C9803 HOPD COVID-19 SPEC COLLECT: HCPCS

## 2020-11-04 LAB — SARS-COV-2 RNA RESP QL NAA+PROBE: NOT DETECTED

## 2020-11-05 ENCOUNTER — HOSPITAL ENCOUNTER (OUTPATIENT)
Facility: HOSPITAL | Age: 70
Setting detail: HOSPITAL OUTPATIENT SURGERY
Discharge: HOME | End: 2020-11-05
Attending: SURGERY | Admitting: SURGERY

## 2020-11-05 ENCOUNTER — ANESTHESIA EVENT (OUTPATIENT)
Dept: PERIOP | Facility: HOSPITAL | Age: 70
End: 2020-11-05

## 2020-11-05 ENCOUNTER — HOSPITAL ENCOUNTER (OUTPATIENT)
Dept: NUCLEAR MEDICINE | Facility: HOSPITAL | Age: 70
Discharge: HOME | End: 2020-11-05

## 2020-11-05 ENCOUNTER — APPOINTMENT (OUTPATIENT)
Dept: GENERAL RADIOLOGY | Facility: HOSPITAL | Age: 70
End: 2020-11-05

## 2020-11-05 ENCOUNTER — APPOINTMENT (OUTPATIENT)
Dept: NUCLEAR MEDICINE | Facility: HOSPITAL | Age: 70
End: 2020-11-05

## 2020-11-05 ENCOUNTER — ANESTHESIA (OUTPATIENT)
Dept: PERIOP | Facility: HOSPITAL | Age: 70
End: 2020-11-05

## 2020-11-05 VITALS
TEMPERATURE: 98.7 F | DIASTOLIC BLOOD PRESSURE: 80 MMHG | OXYGEN SATURATION: 95 % | SYSTOLIC BLOOD PRESSURE: 160 MMHG | HEIGHT: 63 IN | HEART RATE: 84 BPM | BODY MASS INDEX: 45.62 KG/M2 | WEIGHT: 257.5 LBS | RESPIRATION RATE: 16 BRPM

## 2020-11-05 DIAGNOSIS — C50.112 CANCER OF CENTRAL PORTION OF LEFT BREAST (HCC): ICD-10-CM

## 2020-11-05 DIAGNOSIS — C50.512 MALIGNANT NEOPLASM OF LOWER-OUTER QUADRANT OF LEFT BREAST OF FEMALE, ESTROGEN RECEPTOR POSITIVE (HCC): ICD-10-CM

## 2020-11-05 DIAGNOSIS — Z17.0 MALIGNANT NEOPLASM OF LOWER-OUTER QUADRANT OF LEFT BREAST OF FEMALE, ESTROGEN RECEPTOR POSITIVE (HCC): ICD-10-CM

## 2020-11-05 PROCEDURE — 78195 LYMPH SYSTEM IMAGING: CPT | Performed by: SURGERY

## 2020-11-05 PROCEDURE — 76998 US GUIDE INTRAOP: CPT | Performed by: SURGERY

## 2020-11-05 PROCEDURE — 25010000003 LIDOCAINE 1 % SOLUTION 20 ML VIAL: Performed by: SURGERY

## 2020-11-05 PROCEDURE — 25010000002 FENTANYL CITRATE (PF) 100 MCG/2ML SOLUTION: Performed by: ANESTHESIOLOGY

## 2020-11-05 PROCEDURE — 88341 IMHCHEM/IMCYTCHM EA ADD ANTB: CPT | Performed by: SURGERY

## 2020-11-05 PROCEDURE — 38525 BIOPSY/REMOVAL LYMPH NODES: CPT | Performed by: SURGERY

## 2020-11-05 PROCEDURE — 25010000003 CEFAZOLIN IN DEXTROSE 2-4 GM/100ML-% SOLUTION: Performed by: SURGERY

## 2020-11-05 PROCEDURE — 25010000002 SUCCINYLCHOLINE PER 20 MG: Performed by: NURSE ANESTHETIST, CERTIFIED REGISTERED

## 2020-11-05 PROCEDURE — 76098 X-RAY EXAM SURGICAL SPECIMEN: CPT

## 2020-11-05 PROCEDURE — 88360 TUMOR IMMUNOHISTOCHEM/MANUAL: CPT | Performed by: SURGERY

## 2020-11-05 PROCEDURE — 38792 RA TRACER ID OF SENTINL NODE: CPT

## 2020-11-05 PROCEDURE — 88307 TISSUE EXAM BY PATHOLOGIST: CPT | Performed by: SURGERY

## 2020-11-05 PROCEDURE — S0260 H&P FOR SURGERY: HCPCS | Performed by: SURGERY

## 2020-11-05 PROCEDURE — 25010000002 FENTANYL CITRATE (PF) 100 MCG/2ML SOLUTION: Performed by: NURSE ANESTHETIST, CERTIFIED REGISTERED

## 2020-11-05 PROCEDURE — A9541 TC99M SULFUR COLLOID: HCPCS | Performed by: SURGERY

## 2020-11-05 PROCEDURE — 19301 PARTIAL MASTECTOMY: CPT | Performed by: SURGERY

## 2020-11-05 PROCEDURE — 25010000002 PHENYLEPHRINE PER 1 ML: Performed by: NURSE ANESTHETIST, CERTIFIED REGISTERED

## 2020-11-05 PROCEDURE — 25010000002 ONDANSETRON PER 1 MG: Performed by: NURSE ANESTHETIST, CERTIFIED REGISTERED

## 2020-11-05 PROCEDURE — 25010000002 PROPOFOL 10 MG/ML EMULSION: Performed by: NURSE ANESTHETIST, CERTIFIED REGISTERED

## 2020-11-05 PROCEDURE — 25010000002 DEXAMETHASONE PER 1 MG: Performed by: NURSE ANESTHETIST, CERTIFIED REGISTERED

## 2020-11-05 PROCEDURE — 88342 IMHCHEM/IMCYTCHM 1ST ANTB: CPT | Performed by: SURGERY

## 2020-11-05 PROCEDURE — 0 TECHNETIUM FILTERED SULFUR COLLOID: Performed by: SURGERY

## 2020-11-05 DEVICE — HORIZON TI SMALL RED 6 CLIPS/CART
Type: IMPLANTABLE DEVICE | Site: BREAST | Status: FUNCTIONAL
Brand: WECK

## 2020-11-05 DEVICE — HORIZON TI MED 6/CART
Type: IMPLANTABLE DEVICE | Site: BREAST | Status: FUNCTIONAL
Brand: WECK

## 2020-11-05 RX ORDER — BUSPIRONE HYDROCHLORIDE 15 MG/1
15 TABLET ORAL 3 TIMES DAILY
Qty: 270 TABLET | Refills: 1 | Status: SHIPPED | OUTPATIENT
Start: 2020-11-05 | End: 2021-11-29

## 2020-11-05 RX ORDER — DIPHENHYDRAMINE HYDROCHLORIDE 50 MG/ML
12.5 INJECTION INTRAMUSCULAR; INTRAVENOUS
Status: DISCONTINUED | OUTPATIENT
Start: 2020-11-05 | End: 2020-11-05 | Stop reason: HOSPADM

## 2020-11-05 RX ORDER — HYDROMORPHONE HYDROCHLORIDE 1 MG/ML
0.5 INJECTION, SOLUTION INTRAMUSCULAR; INTRAVENOUS; SUBCUTANEOUS
Status: DISCONTINUED | OUTPATIENT
Start: 2020-11-05 | End: 2020-11-05 | Stop reason: HOSPADM

## 2020-11-05 RX ORDER — EPHEDRINE SULFATE 50 MG/ML
INJECTION, SOLUTION INTRAVENOUS AS NEEDED
Status: DISCONTINUED | OUTPATIENT
Start: 2020-11-05 | End: 2020-11-05 | Stop reason: SURG

## 2020-11-05 RX ORDER — FAMOTIDINE 10 MG/ML
20 INJECTION, SOLUTION INTRAVENOUS ONCE
Status: COMPLETED | OUTPATIENT
Start: 2020-11-05 | End: 2020-11-05

## 2020-11-05 RX ORDER — LABETALOL HYDROCHLORIDE 5 MG/ML
5 INJECTION, SOLUTION INTRAVENOUS
Status: DISCONTINUED | OUTPATIENT
Start: 2020-11-05 | End: 2020-11-05 | Stop reason: HOSPADM

## 2020-11-05 RX ORDER — FLUMAZENIL 0.1 MG/ML
0.2 INJECTION INTRAVENOUS AS NEEDED
Status: DISCONTINUED | OUTPATIENT
Start: 2020-11-05 | End: 2020-11-05 | Stop reason: HOSPADM

## 2020-11-05 RX ORDER — ONDANSETRON 2 MG/ML
4 INJECTION INTRAMUSCULAR; INTRAVENOUS ONCE AS NEEDED
Status: DISCONTINUED | OUTPATIENT
Start: 2020-11-05 | End: 2020-11-05 | Stop reason: HOSPADM

## 2020-11-05 RX ORDER — EPHEDRINE SULFATE 50 MG/ML
5 INJECTION, SOLUTION INTRAVENOUS ONCE AS NEEDED
Status: DISCONTINUED | OUTPATIENT
Start: 2020-11-05 | End: 2020-11-05 | Stop reason: HOSPADM

## 2020-11-05 RX ORDER — LIDOCAINE HYDROCHLORIDE 10 MG/ML
0.5 INJECTION, SOLUTION EPIDURAL; INFILTRATION; INTRACAUDAL; PERINEURAL ONCE AS NEEDED
Status: DISCONTINUED | OUTPATIENT
Start: 2020-11-05 | End: 2020-11-05 | Stop reason: HOSPADM

## 2020-11-05 RX ORDER — DIAZEPAM 5 MG/1
5 TABLET ORAL ONCE
Status: COMPLETED | OUTPATIENT
Start: 2020-11-05 | End: 2020-11-05

## 2020-11-05 RX ORDER — SODIUM CHLORIDE 0.9 % (FLUSH) 0.9 %
3 SYRINGE (ML) INJECTION EVERY 12 HOURS SCHEDULED
Status: DISCONTINUED | OUTPATIENT
Start: 2020-11-05 | End: 2020-11-05 | Stop reason: HOSPADM

## 2020-11-05 RX ORDER — SUCCINYLCHOLINE CHLORIDE 20 MG/ML
INJECTION INTRAMUSCULAR; INTRAVENOUS AS NEEDED
Status: DISCONTINUED | OUTPATIENT
Start: 2020-11-05 | End: 2020-11-05 | Stop reason: SURG

## 2020-11-05 RX ORDER — DIPHENHYDRAMINE HCL 25 MG
25 CAPSULE ORAL
Status: DISCONTINUED | OUTPATIENT
Start: 2020-11-05 | End: 2020-11-05 | Stop reason: HOSPADM

## 2020-11-05 RX ORDER — PROMETHAZINE HYDROCHLORIDE 25 MG/1
25 SUPPOSITORY RECTAL ONCE AS NEEDED
Status: DISCONTINUED | OUTPATIENT
Start: 2020-11-05 | End: 2020-11-05 | Stop reason: HOSPADM

## 2020-11-05 RX ORDER — SODIUM CHLORIDE 0.9 % (FLUSH) 0.9 %
3-10 SYRINGE (ML) INJECTION AS NEEDED
Status: DISCONTINUED | OUTPATIENT
Start: 2020-11-05 | End: 2020-11-05 | Stop reason: HOSPADM

## 2020-11-05 RX ORDER — ONDANSETRON 2 MG/ML
INJECTION INTRAMUSCULAR; INTRAVENOUS AS NEEDED
Status: DISCONTINUED | OUTPATIENT
Start: 2020-11-05 | End: 2020-11-05 | Stop reason: SURG

## 2020-11-05 RX ORDER — DEXAMETHASONE SODIUM PHOSPHATE 10 MG/ML
INJECTION INTRAMUSCULAR; INTRAVENOUS AS NEEDED
Status: DISCONTINUED | OUTPATIENT
Start: 2020-11-05 | End: 2020-11-05 | Stop reason: SURG

## 2020-11-05 RX ORDER — OXYCODONE AND ACETAMINOPHEN 7.5; 325 MG/1; MG/1
1 TABLET ORAL ONCE AS NEEDED
Status: DISCONTINUED | OUTPATIENT
Start: 2020-11-05 | End: 2020-11-05 | Stop reason: HOSPADM

## 2020-11-05 RX ORDER — PROMETHAZINE HYDROCHLORIDE 25 MG/1
25 TABLET ORAL ONCE AS NEEDED
Status: DISCONTINUED | OUTPATIENT
Start: 2020-11-05 | End: 2020-11-05 | Stop reason: HOSPADM

## 2020-11-05 RX ORDER — SODIUM CHLORIDE, SODIUM LACTATE, POTASSIUM CHLORIDE, CALCIUM CHLORIDE 600; 310; 30; 20 MG/100ML; MG/100ML; MG/100ML; MG/100ML
100 INJECTION, SOLUTION INTRAVENOUS CONTINUOUS
Status: DISCONTINUED | OUTPATIENT
Start: 2020-11-05 | End: 2020-11-05 | Stop reason: HOSPADM

## 2020-11-05 RX ORDER — PROPOFOL 10 MG/ML
VIAL (ML) INTRAVENOUS AS NEEDED
Status: DISCONTINUED | OUTPATIENT
Start: 2020-11-05 | End: 2020-11-05 | Stop reason: SURG

## 2020-11-05 RX ORDER — SODIUM CHLORIDE, SODIUM LACTATE, POTASSIUM CHLORIDE, CALCIUM CHLORIDE 600; 310; 30; 20 MG/100ML; MG/100ML; MG/100ML; MG/100ML
9 INJECTION, SOLUTION INTRAVENOUS CONTINUOUS
Status: DISCONTINUED | OUTPATIENT
Start: 2020-11-05 | End: 2020-11-05 | Stop reason: HOSPADM

## 2020-11-05 RX ORDER — HYDROCODONE BITARTRATE AND ACETAMINOPHEN 7.5; 325 MG/1; MG/1
1 TABLET ORAL ONCE AS NEEDED
Status: COMPLETED | OUTPATIENT
Start: 2020-11-05 | End: 2020-11-05

## 2020-11-05 RX ORDER — ROCURONIUM BROMIDE 10 MG/ML
INJECTION, SOLUTION INTRAVENOUS AS NEEDED
Status: DISCONTINUED | OUTPATIENT
Start: 2020-11-05 | End: 2020-11-05 | Stop reason: SURG

## 2020-11-05 RX ORDER — FENTANYL CITRATE 50 UG/ML
50 INJECTION, SOLUTION INTRAMUSCULAR; INTRAVENOUS
Status: DISCONTINUED | OUTPATIENT
Start: 2020-11-05 | End: 2020-11-05 | Stop reason: HOSPADM

## 2020-11-05 RX ORDER — NALOXONE HCL 0.4 MG/ML
0.2 VIAL (ML) INJECTION AS NEEDED
Status: DISCONTINUED | OUTPATIENT
Start: 2020-11-05 | End: 2020-11-05 | Stop reason: HOSPADM

## 2020-11-05 RX ORDER — DIPHENHYDRAMINE HCL 25 MG
25 CAPSULE ORAL NIGHTLY PRN
COMMUNITY

## 2020-11-05 RX ORDER — CEFAZOLIN SODIUM 2 G/100ML
2 INJECTION, SOLUTION INTRAVENOUS ONCE
Status: COMPLETED | OUTPATIENT
Start: 2020-11-05 | End: 2020-11-05

## 2020-11-05 RX ORDER — LIDOCAINE HYDROCHLORIDE 20 MG/ML
INJECTION, SOLUTION INFILTRATION; PERINEURAL AS NEEDED
Status: DISCONTINUED | OUTPATIENT
Start: 2020-11-05 | End: 2020-11-05 | Stop reason: SURG

## 2020-11-05 RX ORDER — GLYCOPYRROLATE 0.2 MG/ML
INJECTION INTRAMUSCULAR; INTRAVENOUS AS NEEDED
Status: DISCONTINUED | OUTPATIENT
Start: 2020-11-05 | End: 2020-11-05 | Stop reason: SURG

## 2020-11-05 RX ADMIN — GLYCOPYRROLATE 0.1 MG: 0.2 INJECTION INTRAMUSCULAR; INTRAVENOUS at 12:39

## 2020-11-05 RX ADMIN — PHENYLEPHRINE HYDROCHLORIDE 100 MCG: 10 INJECTION INTRAVENOUS at 13:35

## 2020-11-05 RX ADMIN — PHENYLEPHRINE HYDROCHLORIDE 100 MCG: 10 INJECTION INTRAVENOUS at 13:01

## 2020-11-05 RX ADMIN — TECHNETIUM TC 99M SULFUR COLLOID 1 DOSE: KIT at 11:37

## 2020-11-05 RX ADMIN — FENTANYL CITRATE 50 MCG: 50 INJECTION, SOLUTION INTRAMUSCULAR; INTRAVENOUS at 15:35

## 2020-11-05 RX ADMIN — PHENYLEPHRINE HYDROCHLORIDE 100 MCG: 10 INJECTION INTRAVENOUS at 13:11

## 2020-11-05 RX ADMIN — ONDANSETRON HYDROCHLORIDE 4 MG: 2 SOLUTION INTRAMUSCULAR; INTRAVENOUS at 12:39

## 2020-11-05 RX ADMIN — PHENYLEPHRINE HYDROCHLORIDE 100 MCG: 10 INJECTION INTRAVENOUS at 13:31

## 2020-11-05 RX ADMIN — PHENYLEPHRINE HYDROCHLORIDE 100 MCG: 10 INJECTION INTRAVENOUS at 13:18

## 2020-11-05 RX ADMIN — ROCURONIUM BROMIDE 2.5 MG: 10 INJECTION INTRAVENOUS at 12:41

## 2020-11-05 RX ADMIN — PHENYLEPHRINE HYDROCHLORIDE 100 MCG: 10 INJECTION INTRAVENOUS at 13:54

## 2020-11-05 RX ADMIN — HYDROCODONE BITARTRATE AND ACETAMINOPHEN 1 TABLET: 7.5; 325 TABLET ORAL at 15:26

## 2020-11-05 RX ADMIN — DIAZEPAM 5 MG: 5 TABLET ORAL at 10:45

## 2020-11-05 RX ADMIN — LIDOCAINE HYDROCHLORIDE 35 MG: 20 INJECTION, SOLUTION INFILTRATION; PERINEURAL at 12:41

## 2020-11-05 RX ADMIN — PHENYLEPHRINE HYDROCHLORIDE 100 MCG: 10 INJECTION INTRAVENOUS at 13:39

## 2020-11-05 RX ADMIN — PHENYLEPHRINE HYDROCHLORIDE 100 MCG: 10 INJECTION INTRAVENOUS at 14:14

## 2020-11-05 RX ADMIN — PHENYLEPHRINE HYDROCHLORIDE 100 MCG: 10 INJECTION INTRAVENOUS at 13:43

## 2020-11-05 RX ADMIN — FENTANYL CITRATE 100 MCG: 50 INJECTION INTRAMUSCULAR; INTRAVENOUS at 12:45

## 2020-11-05 RX ADMIN — PHENYLEPHRINE HYDROCHLORIDE 100 MCG: 10 INJECTION INTRAVENOUS at 14:01

## 2020-11-05 RX ADMIN — EPHEDRINE SULFATE 10 MG: 50 INJECTION INTRAVENOUS at 12:51

## 2020-11-05 RX ADMIN — PROPOFOL 200 MG: 10 INJECTION, EMULSION INTRAVENOUS at 12:41

## 2020-11-05 RX ADMIN — EPHEDRINE SULFATE 5 MG: 50 INJECTION INTRAVENOUS at 13:35

## 2020-11-05 RX ADMIN — FENTANYL CITRATE 50 MCG: 50 INJECTION, SOLUTION INTRAMUSCULAR; INTRAVENOUS at 15:18

## 2020-11-05 RX ADMIN — SODIUM CHLORIDE, POTASSIUM CHLORIDE, SODIUM LACTATE AND CALCIUM CHLORIDE 100 ML/HR: 600; 310; 30; 20 INJECTION, SOLUTION INTRAVENOUS at 10:44

## 2020-11-05 RX ADMIN — CEFAZOLIN SODIUM 2 G: 2 INJECTION, SOLUTION INTRAVENOUS at 12:46

## 2020-11-05 RX ADMIN — PHENYLEPHRINE HYDROCHLORIDE 100 MCG: 10 INJECTION INTRAVENOUS at 14:25

## 2020-11-05 RX ADMIN — EPHEDRINE SULFATE 10 MG: 50 INJECTION INTRAVENOUS at 13:17

## 2020-11-05 RX ADMIN — FAMOTIDINE 20 MG: 10 INJECTION INTRAVENOUS at 10:45

## 2020-11-05 RX ADMIN — PHENYLEPHRINE HYDROCHLORIDE 100 MCG: 10 INJECTION INTRAVENOUS at 13:03

## 2020-11-05 RX ADMIN — DEXAMETHASONE SODIUM PHOSPHATE 8 MG: 10 INJECTION INTRAMUSCULAR; INTRAVENOUS at 13:00

## 2020-11-05 RX ADMIN — SUCCINYLCHOLINE CHLORIDE 120 MG: 20 INJECTION, SOLUTION INTRAMUSCULAR; INTRAVENOUS; PARENTERAL at 12:41

## 2020-11-05 RX ADMIN — PROPOFOL 50 MG: 10 INJECTION, EMULSION INTRAVENOUS at 12:43

## 2020-11-05 NOTE — H&P
There are no changes in the history or physical exam, aside from any that are listed as an addendum at the bottom of this document.   Today, patient will go for the surgery listed in the plan below.    Chief Complaint: Helena Romero is a 70 y.o. female who was seen in consultation at the request of Ally Lagos MD  for newly diagnosed breast cancer    History of Present Illness:  Patient presents with newly diagnosed breast cancer. She noted no new masses, skin changes, nipple discharge, nipple changes prior to her most recent imaging.  Her most recent imaging includes the followin2018  [facility]    Mammo  Helena Romero  BILATERAL SCREENING MAMMO WITH ROBI  Scattered areas of fibroglandular density.  BIRADS: 1, Negative.    2020  [facility]    Mammo  Helena Romero  MAMMO ROBI SCREENING BILATERAL  Breasts are almost entirely fatty.  Focal asymmetry in the left breast at 6:00 at posterior depth.  BIRADS: 0    08/10/2020  [facility]    Radiology  Helena Romero  LEFT DIAGNOSTIC MAMMO WITH ROBI  LEFT BREAST US  Family history of breast cancer: niece, at age 38.  9 mm high density mass in the posterior one third lower central left breast. This appears to have increased in size and density when compared to prior images.  Left breast US: No sonographic correlate to the mammographic finding.  BIRADS: 4    She had a biopsy on the following day that showed:   2020  [facility]    Pathology  Helena Romero  MAMMO STEREO BX LEFT BREAST  9 gauge biopsy needle 8 core specimens.  A metallic ribbon shaped biopsy tissue marker was placed.  A left mammogram, obtained immediately post core biopsy, documented sampling of the targeted area. There is a 28 mm associated post biopsy hematoma obscuring the biopsied mass.  ADDENDUM:  Pathology invasive mucinous carcinoma and atypical ductal hyperplasia. This is concordant with imaging findings.    2020  [facility]    Marker Analysis  Helena Romero  ER - >95%  DC -  >95%  HER2/lina ([facility]):  - Negative (score: 0)  Ki-67 Proliferation Index: 5%  Mucinous carcinoma.  Glandular (Acinar)/ Tubular Differentiation: Score 2  Nuclear Pleormorphism: Score 1  Mitotic Rate: Score 1  Overall Grade: Grade 1  Tumor Size: 7.0 mm  Ductal Carcinoma In Situ (DCIS): Not Identified  Additional findings: ATYPICAL DUCTAL HYPERPLASIA    09/17/2020  [facility]   Pathology  Helena Romero  Consult Slides for Review:  1. Left Breast, Lower Central, Posterior One:   A. INVASIVE MUCINOUS CARCINOMA, Well-differentiated; Grade I/III (tubule score =2, nuclear score =1, mitoses score =1), measuring at least 7 mm.  B. Associated atypical ductal hyperplasia.  C. Negative for lymphovascular space invasion in this limited sample.  D. Immunohistochemical studies show:  a. ER - 100%  b. NV - 100%  c. HER2 - Negative (0)  d. Ki-67 - 5%      She has not had a breast biopsy in the past.had a left breast core biopsy in 2016 it was reportedly benign.  Has her uterus and ovaries, is postmenopausal, and takes nor hormones.  Her family history includes the following:   Melanoma Sister age 60.  Thyroid cancer same Sister age 60.  Niece, daughter of the above sister, triple negative breast cancer metastatic age 38.  Paternal aunt pancreas cancer  Mother cervical cancer.      She tells me that she has gained 70 pounds over 2 years.  She is here for evaluation.    Review of Systems:  Review of Systems   Respiratory: Positive for cough and shortness of breath (ONLY WITH EXERTION ).    Cardiovascular: Positive for palpitations.   Gastrointestinal: Positive for abdominal pain and diarrhea.   Musculoskeletal: Positive for arthralgias and back pain.   Hematological: Bruises/bleeds easily (ON XARELTO DAILY ).   Psychiatric/Behavioral: Positive for sleep disturbance. The patient is nervous/anxious.    All other systems reviewed and are negative.       Past Medical and Surgical History:  Breast Biopsy History:  Patient had not had a  breast biopsy prior to her cancer diagnosis.  Breast Cancer HIstory:  Patient does not have a past medical history of breast cancer.  Breast Operations, and year:  NONE   Obstetric/Gynecologic History:  Age menstrual periods began: 12  Patient is postmenopausal due to removal of her uterus in the following year: 2000; AGE 50.    Number of pregnancies: 2  Number of live births: 2  Number of abortions or miscarriages: 0  Age of delivery of first child: 21  Patient did not breast feed.  Length of time taking birth control pills: 5 YRS.   Patient took hormone replacement during the following dates: 4421-0051 MOST RECENT PREMARIN CREAM.   PATIENT HAS NOT TAKEN RECENTLY.   PATIENT HAS UTERUS AND OVARIES.     Past Surgical History:   Procedure Laterality Date   • BREAST BIOPSY Left 2020    MALIGNANT   • CHOLECYSTECTOMY     • COLONOSCOPY  approx 2011    normal per patient-repeat 10 years   • DILATION AND CURETTAGE, DIAGNOSTIC / THERAPEUTIC     • TONSILLECTOMY     • TUBAL ABDOMINAL LIGATION       Past Medical History:   Diagnosis Date   • Acute bronchitis    • Allergic    • Anxiety    • Asthma    • Borderline hyperglycemia    • Cat bite    • Depression    • Diarrhea    • GERD (gastroesophageal reflux disease)    • Hypertension    • Hypocalcemia    • Insomnia    • Low back pain    • Low back pain, episodic    • Need for diphtheria-tetanus-pertussis (Tdap) vaccine    • Neuromuscular disorder (CMS/HCC)    • Obesity    • Osteoarthritis, generalized    • Panic attack    • Pneumonia     x 3   • Seasonal allergies    • Sleep disturbances    • Trochanteric bursitis    • Vaginal candidiasis    • Yeast infection        Prior Hospitalizations, other than for surgery or childbirth, and year:  PARTIALLY BLOCKED BOWEL 2013 AFIB 2020    Social History     Socioeconomic History   • Marital status:      Spouse name: Not on file   • Number of children: Not on file   • Years of education: Not on file   • Highest education level: Not on  "file   Tobacco Use   • Smoking status: Never Smoker   • Smokeless tobacco: Never Used   Substance and Sexual Activity   • Alcohol use: No     Frequency: Never   • Drug use: No     Patient is .  Patient is employed full time with the following occupation: MEDICAL BILLING  Patient drinks 3 servings of caffeine per day.    Family History:  Family History   Problem Relation Age of Onset   • Pancreatic cancer Paternal Aunt    • Cervical cancer Mother 41   • Melanoma Sister 60   • Thyroid cancer Sister 60   • Breast cancer Niece 38        TRIPLE -       Vital Signs:  /80   Pulse 69   Temp 98 °F (36.7 °C)   Ht 165.1 cm (65\")   Wt 118 kg (261 lb)   LMP  (LMP Unknown)   SpO2 95%   Breastfeeding No   BMI 43.43 kg/m²      Medications:    Current Outpatient Medications:   •  atorvastatin (LIPITOR) 20 MG tablet, Take 1 tablet by mouth Daily., Disp: 90 tablet, Rfl: 1  •  busPIRone (BUSPAR) 15 MG tablet, TAKE 1 TABLET BY MOUTH TWICE DAILY, Disp: 180 tablet, Rfl: 1  •  clotrimazole (LOTRIMIN) 1 % external solution, Apply  topically to the appropriate area as directed 2 (Two) Times a Day., Disp: , Rfl:   •  fexofenadine (ALLEGRA) 180 MG tablet, Take 180 mg by mouth Daily., Disp: , Rfl:   •  gabapentin (NEURONTIN) 100 MG capsule, TAKE 3 CAPSULES BY MOUTH EVERY NIGHT, Disp: 90 capsule, Rfl: 1  •  gabapentin (NEURONTIN) 300 MG capsule, Take 1 capsule by mouth Every Night., Disp: 90 capsule, Rfl: 1  •  metoprolol succinate XL (TOPROL-XL) 25 MG 24 hr tablet, Take 1 tablet by mouth Daily., Disp: 90 tablet, Rfl: 3  •  olmesartan (BENICAR) 40 MG tablet, TAKE 1 TABLET BY MOUTH DAILY, Disp: 90 tablet, Rfl: 1  •  omeprazole (priLOSEC) 20 MG capsule, TAKE 1 CAPSULE BY MOUTH DAILY, Disp: 90 capsule, Rfl: 0  •  rivaroxaban (XARELTO) 20 MG tablet, Take 20 mg by mouth Daily., Disp: , Rfl:   •  conjugated estrogens (PREMARIN) 0.625 MG/GM vaginal cream, Insert  into the vagina Daily., Disp: 30 g, Rfl: 5     Allergies:  Allergies " "  Allergen Reactions   • Lisinopril Hives   • Codeine Nausea Only       Physical Examination:  /80   Pulse 69   Temp 98 °F (36.7 °C)   Ht 165.1 cm (65\")   Wt 118 kg (261 lb)   LMP  (LMP Unknown)   SpO2 95%   Breastfeeding No   BMI 43.43 kg/m²   General Appearance:  Patient is in no distress.  She is well kept and has an morbidly obese build.   Psychiatric:  Patient with appropriate mood and affect. Alert and oriented to self, time, and place.    Breast, RIGHT:  medium sized, 44-46D, symmetric with the contralateral side.  Breast skin is without erythema, edema, rashes.  There are no visible abnormalities upon inspection during the arm-raising maneuver or with hands on hips in the sitting position. There is no nipple retraction, discharge or nipple/areolar skin changes.There are no masses palpable in the sitting or supine positions.    Breast, LEFT:  medium sized, 44-46D,  symmetric with the contralateral side.  Breast skin is without erythema, edema, rashes.  There are no visible abnormalities upon inspection during the arm-raising maneuver or with hands on hips in the sitting position. There is no nipple retraction, discharge or nipple/areolar skin changes.There are no masses palpable in the sitting or supine positions.  Left breast there is a mammotomy with minor associated ecchymoses from her recent stereotactic biopsy.  The hematoma seen on bedside ultrasound is immediately underlying the mammotomy at the 6:00, 4.5 cm from the nipple location.  This is the site of the known malignancy.    Lymphatic:  There is no axillary, cervical, infraclavicular, or supraclavicular adenopathy bilaterally.  Eyes:  Pupils are round and reactive to light.  Cardiovascular:  Heart rate and rhythm are regular.  Respiratory:  Lungs are clear bilaterally with no crackles or wheezes in any lung field.  Gastrointestinal:  Abdomen is soft, nondistended, and nontender.     Musculoskeletal:  Good strength in all 4 " extremities.   There is good range of motion in both shoulders.    Skin:  No new skin lesions or rashes on the skin excluding the breast (see breast exam above).        Imagin2015  [facility]   Mammo  Helena Romero  IMPRESSION:  Negative screening digital mammogram.  BIRADS: 1, Negative.    2016  [facility]    Radiology  Helena Romero  DIAGNOSTIC BILATERAL MAMMO  HISTORY: intermittent, recurrent left breast pain following trauma one year ago.  Right breast new circumscribed, lobulated 4-5 mm nodule at 7:00, 8 cm back from the nipple.  Symptomatic area indicated by the patient anterior aspect of the left lower outer quadrant is clearly marked. No underlying breast pathology is seen.  BILATERAL BREAST US  Right 7:00 in the left breast 7:00 is performed. Corresponding to the new right-sided mammographic nodule is an ovoid solid or complex cystic 3.6 x 2.8 x 4.8 mm nodule at 7:30, 7 cm back from the nipple. This is considered suspicious.  BIRADS: 4    02/15/2016  [facility]  Exams:  1. Ultrasound guided biopsy, right breast.  2. Post procedure digital mammogram, right breast.  US-Guided BX  Right breast 7:30 position 7 cm from the nipple.  Coil shaped clip. A [14] gauge achieve. 5 passes were performed.  Post-Procedure Digital Mammo  Biopsy clip to be in satisfactory position.    02/15/2019  [facility]    Pathology  Helena Romero  Right Breast, Core BX at 7:30:  - Benign Mammary Tissue, Duct Dilatation and Columnar Cell Changes.    2016  [facility]    Pathology  Helena Romero  Amended Report:  This is radiology-pathology concordant.  Recommend return to bilateral annual screening mammography.    2018  [facility]  Helena Romero  BILATERAL SCREENING MAMMO WITH ROBI  Scattered areas of fibroglandular density.  BIRADS: 1, Negative.    2020  [facility]  Helena Romero  MAMMO ROBI SCREENING BILATERAL  Breasts are almost entirely fatty.  Focal asymmetry  in the left breast at 6:00 at posterior depth.  BIRADS: 0    08/10/2020  [facility]    Radiology  Helena Romero  LEFT DIAGNOSTIC MAMMO WITH ROBI  LEFT BREAST US  Family history of breast cancer: niece, at age 38.  9 mm high density mass in the posterior one third lower central left breast. This appears to have increased in size and density when compared to prior images.  Left breast US: No sonographic correlate to the mammographic finding.  BIRADS: 4    Pathology:  09/04/2020  [facility]    Pathology  Helena Romero  MAMMO STEREO BX LEFT BREAST  9 gauge biopsy needle 8 core specimens.  A metallic ribbon shaped biopsy tissue marker was placed.  A left mammogram, obtained immediately post core biopsy, documented sampling of the targeted area. There is a 28 mm associated post biopsy hematoma obscuring the biopsied mass.  ADDENDUM:  Pathology invasive mucinous carcinoma and atypical ductal hyperplasia. This is concordant with imaging findings.    09/04/2020  [facility]    Marker Analysis  Helena Romero  ER - >95%  CO - >95%  HER2/lina ([facility]):  - Negative (score: 0)  Ki-67 Proliferation Index: 5%  Mucinous carcinoma.  Glandular (Acinar)/ Tubular Differentiation: Score 2  Nuclear Pleormorphism: Score 1  Mitotic Rate: Score 1  Overall Grade: Grade 1  Tumor Size: 7.0 mm  Ductal Carcinoma In Situ (DCIS): Not Identified  Additional findings: ATYPICAL DUCTAL HYPERPLASIA    09/17/2020  [facility]   Pathology  Helena Romero  Consult Slides for Review:  2. Left Breast, Lower Central, Posterior One:   E. INVASIVE MUCINOUS CARCINOMA, Well-differentiated; Grade I/III (tubule score =2, nuclear score =1, mitoses score =1), measuring at least 7 mm.  F. Associated atypical ductal hyperplasia.  G. Negative for lymphovascular space invasion in this limited sample.  H. Immunohistochemical studies show:  a. ER - 100%  b. CO - 100%  c. HER2 - Negative (0)  d. Ki-67 - 5%    Procedures:      Assessment:   Diagnosis Plan   1. Malignant neoplasm of  lower-outer quadrant of left breast of female, estrogen receptor positive (CMS/HCC)     2. Morbid (severe) obesity due to excess calories (CMS/HCC)     3. Atrial fibrillation, unspecified type (CMS/HCC)     4. FH: breast cancer     5. Family history of melanoma     6. FH: thyroid cancer     7. FH: pancreatic cancer     1-  Left breast 6:00, 4.5 cm from the nipple location of postbiopsy hematoma.  Prebiopsy imaging 9 mm on mammogram, no ultrasound finding.  Patient was unable to tolerate MRI.  7 mm largest measurement in a core.  Invasive mucinous carcinoma, low-grade, 2, 1, 1, associated atypical duct hyperplasia.  No lymphovascular invasion.  Estrogen 100 progesterone 100 HER-2/lina 0 with a Ki-67 of 5%.  Patient did have internal path review.  Clinical stage T1b N0 stage Ia.      2-  BMI 43    3-  Atrial fibrillatinon Dr iNki trujillo  In sinus rhythm today    4-7  Melanoma Sister age 60.  Thyroid cancer same Sister age 60.  Niece, daughter of the above sister, triple negative breast cancer metastatic age 38.  Paternal aunt pancreas cancer  Mother cervical cancer.    Plan:  The patient goes by Juliana.  Juliana and I reviewed her history, imaging, imaging reports, examination and family history together today.  Discussed the favorable nature of low-grade heavily hormone fed mucinous carcinomas.    We reviewed the difference in systemic therapy versus locoregional. She knows that she will be seeing a medical oncologist in the adjuvant setting. We discussed that for early stage breast cancer, there are 2 options for locoregional treatment that have equivalent survival: total mastectomy versus lumpectomy and radiation, either with sentinel node biopsy.   She is interested in breast conservation.   We discussed the option of oncoplastic closure with contralateral mastopexy, and she is not interested in this.  We discussed her having a plastic surgical consultation in the postoperative period as an alternative option, if  she finds that she is dissatisfied with her symmetry. We did discuss that most plastic surgeons will not operate on the irradiated breast, but could perform a reduction on the contralateral side for size symmetry down the road.     We discussed the following procedure:  Left breast charley localized lumpectomy and left axillary sentinel lymph node biopsy.    She understands the risks of lumpectomy including bleeding, infection, seroma and 25% chance of positive margins. She understands the risks of  sentinel node biopsy, including 7% chance of lymphedema, injury to nerves or vessels in the axilla,  seroma. We discussed that we would review her pathology from her sentinel node procedure in consideration of a complete axillary dissection, after her procedure.   NCCN guidelines have been followed.  We fitted her for a postoperative bra, gave her EMLA cream and tegederm for her sentinel node procedure, and had her to see our nurse navigator. She will receive a recommendation for radiation after surgery.    We also discussed her family history and have sent off an [employer] common hereditary panel add on breast today.  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  Both she and her  were and they lost 1 of their 2 sons at age 5 to cystic fibrosis.  She would like to have her genetic report back prior to surgery.  Her niece who had a triple negative breast cancer at 38 I did recommend that she pursue genetic testing.      Due to the hematoma we will wait approximately 4 weeks for surgery.      We discussed the importance and significance of her obesity today.  We discussed that a critical part of her medical management will be weight loss.  We discussed the nature of aromatase inhibitors and the decreased efficacy in women who are obese.  We discussed having her talk with a nutritionist and she was happy to do this.  She tells me that she has gained 70 pounds over 2 years.  We discussed that the aromatase  inhibitors can make this weight loss aggravated.  I will also have her see physical therapy for bioimpedance measurements of her arms preoperatively due to her obesity.  She request to see radiation oncologist in [place] so we will refer her to see Dr. Aranda postoperatively.    I have asked her to hold her Xarelto for 3 days preoperatively.  We will make sure that this is okay with .  We discussed that she could start her Xarelto back once I have called her with her pathology report approximately 3 days postoperatively.        Inés Jones MD        We have spent 65 minutes in visit today, 45 in face to face .      Next Appointment:  No follow-ups on file.      EMR Dragon/transcription disclaimer:    Much of this encounter note is an electronic transcription/translocation of spoken language to printed text.  The electronic translation of spoken language may permit erroneous, or at times, nonsensical words or phrases to be inadvertently transcribed.  Although I have reviewed the note from such areas, some may still exist.

## 2020-11-05 NOTE — ANESTHESIA PREPROCEDURE EVALUATION
Anesthesia Evaluation     Patient summary reviewed and Nursing notes reviewed   NPO Solid Status: > 8 hours  NPO Liquid Status: > 2 hours           Airway   Mallampati: I  TM distance: >3 FB  Neck ROM: full  No difficulty expected  Dental - normal exam     Pulmonary - negative pulmonary ROS and normal exam   (-) pneumonia, asthma  Cardiovascular - normal exam  Exercise tolerance: good (4-7 METS)    (+) hypertension 2 medications or greater, valvular problems/murmurs MR, dysrhythmias Paroxysmal Atrial Fib, hyperlipidemia,       Neuro/Psych  (+) psychiatric history Anxiety and Depression,     GI/Hepatic/Renal/Endo    (+) morbid obesity, GERD well controlled,      Musculoskeletal     Abdominal   (+) obese,     Bowel sounds: normal.   Substance History - negative use     OB/GYN negative ob/gyn ROS         Other   arthritis,    history of cancer remission                    Anesthesia Plan    ASA 3     general     intravenous induction     Anesthetic plan, all risks, benefits, and alternatives have been provided, discussed and informed consent has been obtained with: patient.    Plan discussed with CRNA and attending.

## 2020-11-05 NOTE — OP NOTE
Operative note    Preoperative Diagnosis: Breast cancer    Postoperative Diagnosis: Breast cancer    Procedure Performed: LEFT breast ultrasound and OTILIA guided lumpectomy and LEFT DEEP axillary sentinel node biopsy with lymphoscintographic guidance.    Dictating physician and surgeon: Inés Jones MD    EBL:min    FINDINGS AND DESCRIPTION OF PROCEDURE:       The patient was brought to the operating room and placed on the table in the supine position. After adequate general-ETT anesthesia was obtained, we sterilly prepped and draped the breast and axilla. We did a time out to identify correct patient and correct operative site.  She did receive IV antibiotic within an hour of and prior to incision.     I used the intraoperative ultrasound to examine the lesion of interest. I also used the OTILIA handpiece for audible feedback for localization.  I marked the intended area for resection and planned my incision based on the imaging and j luis.     I included an ellipse of skin overlying the tumor for margins and orientation.    LOQ radial incision.    I used a 15 blade to incise the skin. I then dissected using bovie electrocautery superiorly, inferiorly, medially and laterally around the lesion.  I examined the specimen using the intraoperative faxitron to document the presence of the lesion within the specimen.I then took the following additional margins- superior, inferior, medial, lateral, anterior, posterior.     I then passed the specimens and labelled  as follows: For the lumpectomy,  long stitch lateral short stitch superior, double stitch deep. For the margins, stitch marks true margin.    I then irrigated, assured hemostasis.  I then mobilized the breast posteriorly and closed a deep layer of breast tissue using a running 2-0 vicryl, followed by a superficial layer of breast using a running 2-0 vicryl. I then closed the skin in 2 layers- the first being n interrupted 3-0 vicryl layer, followed by a  running 4-0 monocryl.    I then applied lengthwise 1/2 inch steri strips, an island dressing.    Next, I turned my attention to the axilla. I used the neoprobe at the start of the case to ensure that the radiotracer had migrated to the axilla, which it had.  I then made a curvilinear incision at the inferior margin of the hairbearing area using a 15 blade.  I then used bovie for dissection, spring retractors for exposure and the gamma probe to assist in finding the nodes with radiopharmaceutical uptake.     There were 2 sentinel nodes. All were grossly normal.Counts 491, 507    I then irrigated, assured hemostasis and closed the skin in 2 layers, the first an interrupted 3-0 vicryl suture layer and the second a running 4-0 monocryl suture layer.      These were sent for permanent analysis.    Then I wrapped her in 4x4s and a 6 inch ACE wrap.    She tolerated the procedure well, there were no immediate complications, and all counts were correct at the end of the case.

## 2020-11-05 NOTE — DISCHARGE INSTRUCTIONS
You were given Norco for pain at 03:26PM      Please give patients the following postoperative WOUND CARE and discharge instructions:     FOR PATIENTS WHO HAVE HAD BREAST SURGERY:   IF..... patient has had reconstruction or oncoplastic closure, wound care per plastic surgeon.   IF PATIENT DOES NOT HAVE PLASTIC SURGEON, keep ace wrap (if present)  in place and dressings dry for 72 hours. May then remove ACE wrap (if present) and dressings and may shower. Leave steri strips on skin and ignore clear suture tails.  Blot dry incision with towel.  No tubs, hot tubs, pools. May wear post op bra after removing ACE. Prefer patient to wear either bra or ACE until followup visit.     FOR PATIENTS WHO HAVE HAD PORT PLACED:   Keep dressing  in place and dressings dry for 72 hours.   May then remove dressings and may shower. Leave steri strips on skin and ignore clear suture tails.  Blot dry incision with towel.  No tubs, hot tubs, pools.   May use port immediately, but prefer to keep incision dry for 3 days.  Do not remove steri strips for 7-10 days.     FOR ALL PATIENTS:   Responsible adult to stay with patient at discharge and at least 24 hours after discharge.   Call the office for any of the following: persistent bleeding, excessive bruising or swelling, excessive sleepiness or trouble breathing, severe pain, persistent nausea or vomiting, fever greater than 101.0     Postop appointment was scheduled in the office preoperatively. If patient cannot find that appointment, please call the office for a reminder on the next business day. [phone].

## 2020-11-05 NOTE — PERIOPERATIVE NURSING NOTE
Phone call to  at patient request.  Informed her that patient was OK with pain until IV was out and she started getting dressed.  Patient wants to know if she may take another pain pill when she gets home in about 1 hour.  Dr. Jones gave permission to do so.    Patient and spouse informed. Verbalized understanding.

## 2020-11-05 NOTE — ANESTHESIA PROCEDURE NOTES
Airway  Urgency: elective    Date/Time: 11/5/2020 12:42 PM  Airway not difficult (should use shoulder roll next time)    General Information and Staff    Patient location during procedure: OR  CRNA: Chanelle Bernabe CRNA    Indications and Patient Condition  Indications for airway management: airway protection    Preoxygenated: yes  Mask difficulty assessment: 1 - vent by mask    Final Airway Details  Final airway type: endotracheal airway      Successful airway: ETT  Cuffed: yes   Successful intubation technique: direct laryngoscopy  Facilitating devices/methods: intubating stylet  Endotracheal tube insertion site: oral  Blade: [place]  Blade size: 2  ETT size (mm): 7.0  Cormack-Lehane Classification: grade IIa - partial view of glottis  Placement verified by: chest auscultation and capnometry   Cuff volume (mL): 7  Measured from: teeth  ETT/EBT  to teeth (cm): 19  Number of attempts at approach: 1  Assessment: lips, teeth, and gum same as pre-op and atraumatic intubation

## 2020-11-05 NOTE — ANESTHESIA POSTPROCEDURE EVALUATION
Patient: Helena Romero    Procedure Summary     Date: 11/05/20 Room / Location: [facility] OR [address] [place] [address] MAIN OR    Anesthesia Start: 1233 Anesthesia Stop: 1458    Procedure: Left breast charley localized lumpectomy and left axillary sentinel lymph node biopsy (Left Breast) Diagnosis:       Malignant neoplasm of lower-outer quadrant of left breast of female, estrogen receptor positive (CMS/HCC)      (Malignant neoplasm of lower-outer quadrant of left breast of female, estrogen receptor positive (CMS/HCC) [C50.512, Z17.0])    Surgeon: Inés Jones MD Provider: Mónica Washington MD    Anesthesia Type: general ASA Status: 3          Anesthesia Type: general    Vitals  Vitals Value Taken Time   /68 11/05/20 1535   Temp 37.1 °C (98.7 °F) 11/05/20 1535   Pulse 80 11/05/20 1543   Resp 16 11/05/20 1535   SpO2 94 % 11/05/20 1545   Vitals shown include unvalidated device data.        Post Anesthesia Care and Evaluation    Patient location during evaluation: PACU  Patient participation: complete - patient participated  Level of consciousness: awake and alert  Pain management: adequate  Airway patency: patent  Anesthetic complications: No anesthetic complications    Cardiovascular status: acceptable  Respiratory status: acceptable  Hydration status: acceptable    Comments: ---------------------------               11/05/20                      1535         ---------------------------   BP:          149/68         Pulse:                      Resp:          16           Temp:   37.1 °C (98.7 °F)   SpO2:                      ---------------------------

## 2020-11-10 ENCOUNTER — TELEPHONE (OUTPATIENT)
Dept: SURGERY | Facility: CLINIC | Age: 70
End: 2020-11-10

## 2020-11-10 DIAGNOSIS — C50.512 CARCINOMA OF LOWER-OUTER QUADRANT OF LEFT FEMALE BREAST, UNSPECIFIED ESTROGEN RECEPTOR STATUS (HCC): Primary | ICD-10-CM

## 2020-11-10 NOTE — TELEPHONE ENCOUNTER
November 5, 2020 left breast OTILIA localized lumpectomy and left axillary sentinel lymph node biopsy pathology returned as invasive mucinous carcinoma, 1.2 cm, low-grade, 2, 1, 1, no lymphovascular space invasion, associated duct carcinoma in situ, 6 mm, low-grade cribriform and solid.  All margins are clear with the closest being 4 mm from the inferior margin.  All margins are clear for precancer being 6 mm from the inferior margin.  Additional margins are benign.  0 of 2 sentinel nodes.  Pathologic stage T1CN0 stage Ia.  We will send an Oncotype and arrange for her to see medical oncology and radiation oncology.    Final Diagnosis   1. Left Breast, Lumpectomy:                A. Invasive mucinous carcinoma:                            1. Invasive carcinoma measures 12 mm x 10 mm x 7 mm.                            2. Overall [place] grade I (tubular score = 2, nuclear score =1,                                mitotic score = 1).                            3. No lymphovascular space invasion by carcinoma identified.                            4. Area of carcinoma transected by previous biopsy site.               B. Associated ductal carcinoma in situ (DCIS):                            1. DCIS spans an area estimated at 6 mm x 6 mm x 3 mm.                            2. Low grade cribriform and solid DCIS.               C. All margins are negative for invasive mucinous carcinoma.                     Invasive carcinoma measures 4 mm from the (Inferior) margin of excision.                     All other margins measure at least 9 mm from invasive carcinoma including:                            Anterior margin =  18 mm                            Posterior margin = 36 mm                            Superior margin = 9 mm                            Inferior margin = 4 mm                             Lateral margin =  25 mm                            Medial margin =  11 mm                  D. All margins are negative for  DCIS.                    DCIS measures 6 mm from the closest (Inferior) margin of excision.                    All other margins  measure at least 10 mm from in situ carcinoma including:                            Anterior margin = 24 mm                            Posterior margin = 36 mm                            Superior margin = 10 mm                            Inferior margin = 6 mm                             Lateral margin = 25 mm                            Medial margin = 16 mm                  E. Focal biopsy site changes are identified, and two metallic clips retrieved.               F. No Pagetoid involvement of skin by malignancy identified.               G. Non-neoplastic breast tissue with sclerosing adenosis, adenosis, usual duct hyperplasia,                     fibrocystic change and micropapillomas.       Microcalcification present in benign breast tissue.               H. Previous Biomarkers: Estrogen receptors: Positive (100%), Progesterone receptors: Positive (100%),                    HER/2-lina: Negative (Score 0), Ki-67 = 5% (see [id number]).     2. Left Breast, New Anterior Margin:               A. No in situ nor infiltrating carcinoma identified.               B. New margin is negative for malignancy by additional 8 mm.     3. Left Breast, New Superior Margin:               A. No in situ nor infiltrating carcinoma identified.               B. New margin is negative for malignancy by additional 10 mm.     4. Left Breast, New Lateral Margin:                A. No in situ nor infiltrating carcinoma identified.               B. New margin is negative for malignancy by additional 11 mm.     5. Left Breast, New Inferior Margin:               A. No in situ nor infiltrating carcinoma identified.               B. New margin is negative for malignancy by additional 10 mm.     6. Left Breast, New Medial Margin:               A. No in situ nor infiltrating carcinoma identified.               B.  New margin is negative for malignancy by additional 10 mm.     7. Left Breast, New Posterior Margin:               A. No in situ nor infiltrating carcinoma identified.               B. New margin is negative for malignancy by additional 6 mm.     8. Left Axilla, [place] Lymph Node #1 (Count 491):               A. One lymph node negative for metastatic carcinoma by routine staining (0/1).     9. Left Axilla, [place] Lymph Node #2 (Count 507):               A. One lymph node negative for metastatic carcinoma by routine staining (0/1).     See synoptic for tumor details.     jat/jse/kds   Electronically signed by Connor Acuña MD on 11/10/2020 at 0821   Synoptic Checklist   INVASIVE CARCINOMA OF THE BREAST: Resection  INVASIVE CARCINOMA OF THE BREAST: COMPLETE EXCISION - All Specimens  8th Edition - Protocol posted: 2/26/2020  SPECIMEN   Procedure  Excision (less than total mastectomy)    Specimen Laterality  Left    TUMOR   Tumor Site  Central      Clock position      6 o'clock    Histologic Type  Mucinous carcinoma    Glandular (Acinar) / Tubular Differentiation  Score 2    Nuclear Pleomorphism  Score 1    Mitotic Rate  Score 1    Overall Grade  Grade 1 (scores of 3, 4 or 5)    Tumor Size  Greatest dimension of largest invasive focus (Millimeters): 12 mm   Additional Dimension (Millimeters)  10 mm     7 mm   Tumor Focality  Single focus of invasive carcinoma    Ductal Carcinoma In Situ (DCIS)  Present      Negative for extensive intraductal component (EIC)    Size (Extent) of DCIS  Estimated size (extent) of DCIS is at least (Millimeters): 6 mm   Additional Dimension (Millimeters)  6 mm     3 mm   Architectural Patterns  Cribriform      Solid    Nuclear Grade  Grade I (low)    Necrosis  Not identified    Lobular Carcinoma In Situ (LCIS)  Not identified    Tumor Extent     Lymphovascular Invasion  Not identified    Dermal Lymphovascular Invasion  Not identified    Microcalcifications  Present in non-neoplastic  tissue    Treatment Effect in the Breast  No known presurgical therapy    MARGINS   Invasive Carcinoma Margins  Uninvolved by invasive carcinoma    Distance from Closest Margin (Millimeters)  14 mm   Closest Margin(s)  Inferior    Distance from Other Margins     Anterior Margin (Millimeters)  26 mm   Posterior Margin (Millimeters)  42 mm   Superior Margin (Millimeters)  19 mm   Inferior Margin (Millimeters)  14 mm   Medial Margin (Millimeters)  21 mm   Lateral Margin (Millimeters)  36 mm   DCIS Margins  Uninvolved by DCIS    Distance from Closest Margin (Millimeters)  16 mm   Closest Margin(s)  Inferior    LYMPH NODES   Regional Lymph Nodes  Uninvolved by tumor cells    Total Number of Lymph Nodes Examined  2    Number of [place] Nodes Examined  2    PATHOLOGIC STAGE CLASSIFICATION (pTNM, AJCC 8th Edition)      Primary Tumor (pT)  pT1c    Regional Lymph Nodes Modifier  (sn): [place] node(s) evaluated.    Regional Lymph Nodes (pN)  pN0    Breast Biomarker Testing Performed on Previous Biopsy     Estrogen Receptor (ER) Status  Positive (greater than 10% of cells demonstrate nuclear positivity)    Percentage of Cells with Nuclear Positivity  100 %   Breast Biomarker Testing Performed on Previous Biopsy     Progesterone Receptor (PgR) Status  Positive    Percentage of Cells with Nuclear Positivity  100 %   Breast Biomarker Testing Performed on Previous Biopsy     HER2 (by immunohistochemistry)  Negative (Score 0)    Breast Biomarker Testing Performed on Previous Biopsy     Ki-67 Percentage of Positive Nuclei  5 %   Testing Performed on Case Number  [id number]    .      Comment    Immunostains for p120, E-Cadherin, Ki67, Estrogen receptor, Progesterone receptor, p63 and SMMHC were performed on block 1E utilizing appropriate controls.  Both an invasive mucinous carcinoma and low-grade ductal carcinoma in-situ (DCIS) are confirmed by SMMHC and p63 staining.  Both components show diffuse strong immunoreactivity for p120  and E-Cadherin consistent with ductal phenotype.  Ductal carcinoma in-situ demonstrates diffuse strong immunoreactivity for both estrogen receptor and progesterone receptor; similar to that of the invasive mucinous carcinoma component.  Ki67 immunoreactivity is seen in approximately 6% of the DCIS and slightly less percentage in the invasive mucinous carcinoma.  Additionally, immunostains for E-Cadherin, p120, estrogen receptor, p63 and Ck5/6 were performed on block 2B utilizing appropriate controls.  All structures of concern demonstrate diffuse strong immunoreactivity for p120 and E-Cadherin in a pattern consistent with ductal phenotype.  An intraductal papilloma is identified with florid hyperplastic component.  Immunostain for p63, Ck5/6 and estrogen receptor support intraductal papilloma with focal florid ductal hyperplasia of the usual type.  No in-situ nor infiltrating carcinoma is identified in these sections.  Representative sections were shared in internal consultation with Dr. Valladares, who concurred with the above diagnosis.    Sera/nuha

## 2020-11-11 ENCOUNTER — TELEPHONE (OUTPATIENT)
Dept: SURGERY | Facility: CLINIC | Age: 70
End: 2020-11-11

## 2020-11-11 NOTE — TELEPHONE ENCOUNTER
Had to leave message for Dr Mat Goldman, pt needs appt.    Faxed records over to [facility] for patient to see Dr.Natalie Cartwright Medical Oncologist.    They will call patient and let us know the date.    Shilpi SCHAFER for pt to call me.  Shilpi

## 2020-11-11 NOTE — TELEPHONE ENCOUNTER
Pt is scheduled for Dr.Natalie Cartwright  12-3-20 arrive 12:30      [facility] is calling the patient.  Shilpi

## 2020-11-17 ENCOUNTER — TELEPHONE (OUTPATIENT)
Dept: SURGERY | Facility: CLINIC | Age: 70
End: 2020-11-17

## 2020-11-17 ENCOUNTER — OFFICE VISIT (OUTPATIENT)
Dept: SURGERY | Facility: CLINIC | Age: 70
End: 2020-11-17

## 2020-11-17 VITALS
OXYGEN SATURATION: 98 % | SYSTOLIC BLOOD PRESSURE: 146 MMHG | TEMPERATURE: 96.5 F | DIASTOLIC BLOOD PRESSURE: 85 MMHG | HEART RATE: 65 BPM | WEIGHT: 261.8 LBS | BODY MASS INDEX: 43.62 KG/M2 | HEIGHT: 65 IN

## 2020-11-17 DIAGNOSIS — E66.01 MORBID (SEVERE) OBESITY DUE TO EXCESS CALORIES (HCC): ICD-10-CM

## 2020-11-17 DIAGNOSIS — Z80.8 FAMILY HISTORY OF MELANOMA: ICD-10-CM

## 2020-11-17 DIAGNOSIS — Z80.3 FH: BREAST CANCER: ICD-10-CM

## 2020-11-17 DIAGNOSIS — C50.512 CARCINOMA OF LOWER-OUTER QUADRANT OF LEFT FEMALE BREAST, UNSPECIFIED ESTROGEN RECEPTOR STATUS (HCC): Primary | ICD-10-CM

## 2020-11-17 DIAGNOSIS — Z12.31 ENCOUNTER FOR SCREENING MAMMOGRAM FOR MALIGNANT NEOPLASM OF BREAST: ICD-10-CM

## 2020-11-17 DIAGNOSIS — I48.91 ATRIAL FIBRILLATION, UNSPECIFIED TYPE (HCC): ICD-10-CM

## 2020-11-17 DIAGNOSIS — Z80.0 FH: PANCREATIC CANCER: ICD-10-CM

## 2020-11-17 DIAGNOSIS — Z80.8 FH: THYROID CANCER: ICD-10-CM

## 2020-11-17 PROCEDURE — 99024 POSTOP FOLLOW-UP VISIT: CPT | Performed by: SURGERY

## 2020-11-17 NOTE — PROGRESS NOTES
Chief Complaint: Helena Romero is a 70 y.o. female who was seen in consultation at the request of No ref. provider found  for newly diagnosed breast cancer    History of Present Illness:  Patient presents with newly diagnosed breast cancer. She noted no new masses, skin changes, nipple discharge, nipple changes prior to her most recent imaging.  Her most recent imaging includes the followin2018  [facility]    Mammo  Helena Romero  BILATERAL SCREENING MAMMO WITH ROBI  Scattered areas of fibroglandular density.  BIRADS: 1, Negative.    2020  [facility]    Mammo  Helena Romero  MAMMO ROBI SCREENING BILATERAL  Breasts are almost entirely fatty.  Focal asymmetry in the left breast at 6:00 at posterior depth.  BIRADS: 0    08/10/2020  [facility]    Radiology  Helena Romero  LEFT DIAGNOSTIC MAMMO WITH ROBI  LEFT BREAST US  Family history of breast cancer: niece, at age 38.  9 mm high density mass in the posterior one third lower central left breast. This appears to have increased in size and density when compared to prior images.  Left breast US: No sonographic correlate to the mammographic finding.  BIRADS: 4    She had a biopsy on the following day that showed:   2020  [facility]    Pathology  Helena Romero  MAMMO STEREO BX LEFT BREAST  9 gauge biopsy needle 8 core specimens.  A metallic ribbon shaped biopsy tissue marker was placed.  A left mammogram, obtained immediately post core biopsy, documented sampling of the targeted area. There is a 28 mm associated post biopsy hematoma obscuring the biopsied mass.  ADDENDUM:  Pathology invasive mucinous carcinoma and atypical ductal hyperplasia. This is concordant with imaging findings.    2020  [facility]    Marker Analysis  Helena Romero  ER - >95%  NM - >95%  HER2/lina ([facility]):  - Negative (score: 0)  Ki-67 Proliferation Index: 5%  Mucinous carcinoma.  Glandular (Acinar)/ Tubular Differentiation: Score 2  Nuclear Pleormorphism: Score 1  Mitotic Rate: Score  1  Overall Grade: Grade 1  Tumor Size: 7.0 mm  Ductal Carcinoma In Situ (DCIS): Not Identified  Additional findings: ATYPICAL DUCTAL HYPERPLASIA    09/17/2020  [facility]   Pathology  Helena Romero  Consult Slides for Review:  1. Left Breast, Lower Central, Posterior One:   A. INVASIVE MUCINOUS CARCINOMA, Well-differentiated; Grade I/III (tubule score =2, nuclear score =1, mitoses score =1), measuring at least 7 mm.  B. Associated atypical ductal hyperplasia.  C. Negative for lymphovascular space invasion in this limited sample.  D. Immunohistochemical studies show:  a. ER - 100%  b. [place] - 100%  c. HER2 - Negative (0)  d. Ki-67 - 5%      She has not had a breast biopsy in the past.had a left breast core biopsy in 2016 it was reportedly benign.  Has her uterus and ovaries, is postmenopausal, and takes nor hormones.  Her family history includes the following:   Melanoma Sister age 60.  Thyroid cancer same Sister age 60.  Niece, daughter of the above sister, triple negative breast cancer metastatic age 38.  Paternal aunt pancreas cancer  Mother cervical cancer.      She tells me that she has gained 70 pounds over 2 years.    Interval History:  November 5, 2020 left breast OTILIA localized lumpectomy and left axillary sentinel lymph node biopsy pathology returned as invasive mucinous carcinoma, 1.2 cm, low-grade, 2, 1, 1, no lymphovascular space invasion, associated duct carcinoma in situ, 6 mm, low-grade cribriform and solid.  All margins are clear with the closest being 4 mm from the inferior margin.  All margins are clear for precancer being 6 mm from the inferior margin.  Additional margins are benign.  0 of 2 sentinel nodes.  Pathologic stage T1CN0 stage Ia.    Denies significant redness, warmth, drainage from incisions. Denies significant discomfort.    She is back on her xarelto.    Review of Systems:  Review of Systems   Constitutional: Negative.    Respiratory: Positive for cough and shortness of breath  (ONLY WITH EXERTION ).    Cardiovascular: Positive for palpitations.   Gastrointestinal: Positive for abdominal pain and diarrhea.   Musculoskeletal: Positive for arthralgias and back pain.   Hematological: Bruises/bleeds easily (ON XARELTO DAILY ).   Psychiatric/Behavioral: Positive for sleep disturbance. The patient is nervous/anxious.    All other systems reviewed and are negative.       Past Medical and Surgical History:  Breast Biopsy History:  Patient had not had a breast biopsy prior to her cancer diagnosis.  Breast Cancer HIstory:  Patient does not have a past medical history of breast cancer.  Breast Operations, and year:  NONE   Obstetric/Gynecologic History:  Age menstrual periods began: 12  Patient is postmenopausal due to removal of her uterus in the following year: 2000; AGE 50.    Number of pregnancies: 2  Number of live births: 2  Number of abortions or miscarriages: 0  Age of delivery of first child: 21  Patient did not breast feed.  Length of time taking birth control pills: 5 YRS.   Patient took hormone replacement during the following dates: 0126-0285 MOST RECENT PREMARIN CREAM.   PATIENT HAS NOT TAKEN RECENTLY.   PATIENT HAS UTERUS AND OVARIES.     Past Surgical History:   Procedure Laterality Date   • BREAST BIOPSY Left 2020    MALIGNANT   • BREAST LUMPECTOMY WITH SENTINEL NODE BIOPSY Left 11/5/2020    Procedure: Left breast charley localized lumpectomy and left axillary sentinel lymph node biopsy;  Surgeon: Inés Jones MD;  Location: [facility];  Service: General;  Laterality: Left;   • CERVICAL CONIZATION, LEEP     • CHOLECYSTECTOMY     • COLONOSCOPY  approx 2011    normal per patient-repeat 10 years   • DILATION AND CURETTAGE, DIAGNOSTIC / THERAPEUTIC     • TONSILLECTOMY     • TUBAL ABDOMINAL LIGATION       Past Medical History:   Diagnosis Date   • Acute bronchitis    • Allergic    • Anesthesia complication     PATIENT STAYS SLEEPY A LONGTIME AFTER SURGERY   • Anxiety    • Asthma   "  • Atrial fibrillation (CMS/HCC)    • Borderline hyperglycemia    • Breast cancer (CMS/HCC)    • Cat bite    • Depression    • Diarrhea    • GERD (gastroesophageal reflux disease)    • Hypertension    • Hypocalcemia    • Insomnia    • Low back pain    • Low back pain, episodic    • Need for diphtheria-tetanus-pertussis (Tdap) vaccine    • Neuromuscular disorder (CMS/HCC)    • Obesity    • Osteoarthritis, generalized    • Panic attack    • Pneumonia     x 3   • Seasonal allergies    • Sleep disturbances    • Trochanteric bursitis    • Vaginal candidiasis    • Yeast infection        Prior Hospitalizations, other than for surgery or childbirth, and year:  PARTIALLY BLOCKED BOWEL 2013 AFIB 2020    Social History     Socioeconomic History   • Marital status:      Spouse name: Not on file   • Number of children: Not on file   • Years of education: Not on file   • Highest education level: Not on file   Tobacco Use   • Smoking status: Never Smoker   • Smokeless tobacco: Never Used   Substance and Sexual Activity   • Alcohol use: No     Frequency: Never   • Drug use: No   • Sexual activity: Defer     Patient is .  Patient is employed full time with the following occupation: MEDICAL BILLING  Patient drinks 3 servings of caffeine per day.    Family History:  Family History   Problem Relation Age of Onset   • Pancreatic cancer Paternal Aunt    • Cervical cancer Mother 41   • Melanoma Sister 60   • Thyroid cancer Sister 60   • Breast cancer Niece 38        TRIPLE -   • Malig Hyperthermia Neg Hx        Vital Signs:  /85 (BP Location: Left arm, Patient Position: Sitting, Cuff Size: Large Adult)   Pulse 65   Temp 96.5 °F (35.8 °C) (Temporal)   Ht 165.1 cm (65\")   Wt 119 kg (261 lb 12.8 oz)   LMP  (LMP Unknown)   SpO2 98%   BMI 43.57 kg/m²      Medications:    Current Outpatient Medications:   •  Acetaminophen (TYLENOL 8 HOUR ARTHRITIS PAIN PO), Take 650 mg by mouth Daily As Needed., Disp: , Rfl:   •  " albuterol sulfate  (90 Base) MCG/ACT inhaler, Inhale 2 puffs Every 4 (Four) Hours As Needed for Wheezing., Disp: , Rfl:   •  atorvastatin (LIPITOR) 20 MG tablet, Take 1 tablet by mouth Daily., Disp: 90 tablet, Rfl: 1  •  busPIRone (BUSPAR) 15 MG tablet, Take 1 tablet by mouth 3 (Three) Times a Day., Disp: 270 tablet, Rfl: 1  •  clotrimazole (LOTRIMIN) 1 % external solution, Apply  topically to the appropriate area as directed 2 (Two) Times a Day., Disp: , Rfl:   •  diphenhydrAMINE (BENADRYL) 25 mg capsule, Take 25 mg by mouth At Night As Needed for Sleep., Disp: , Rfl:   •  fexofenadine (ALLEGRA) 180 MG tablet, Take 180 mg by mouth Daily., Disp: , Rfl:   •  gabapentin (NEURONTIN) 100 MG capsule, TAKE 3 CAPSULES BY MOUTH EVERY NIGHT, Disp: 90 capsule, Rfl: 1  •  HYDROcodone-acetaminophen (NORCO) 5-325 MG per tablet, 1tab po q 4-6hr prn pain . Stagger if using other sedating medications such as valium. Do not take tylenol in addition to lortab. (Patient taking differently: Take 1 tablet by mouth Every 4 (Four) Hours As Needed. 1tab po q 4-6hr prn pain . Stagger if using other sedating medications such as valium. Do not take tylenol in addition to lortab.), Disp: 20 tablet, Rfl: 0  •  metoprolol succinate XL (TOPROL-XL) 25 MG 24 hr tablet, Take 1 tablet by mouth Daily. (Patient taking differently: Take 25 mg by mouth 2 (two) times a day.), Disp: 90 tablet, Rfl: 3  •  olmesartan (BENICAR) 40 MG tablet, TAKE 1 TABLET BY MOUTH DAILY, Disp: 90 tablet, Rfl: 1  •  omeprazole (priLOSEC) 20 MG capsule, TAKE 1 CAPSULE BY MOUTH DAILY, Disp: 90 capsule, Rfl: 0  •  ondansetron (ZOFRAN) 4 MG tablet, Take 1 tablet by mouth Every 8 (Eight) Hours As Needed for Nausea or Vomiting. May take 8 mg po q8hr prn is 4mg is not effective, Disp: 10 tablet, Rfl: 1  •  polyethylene glycol (MiraLax) 17 GM/SCOOP powder, Take 17 g by mouth Daily. Take cap of powder with 8oz water daily surrounding surgery and while on narcotic, Disp: 119 g,  "Rfl: 0     Allergies:  Allergies   Allergen Reactions   • Codeine Nausea Only   • Lisinopril Hives       Physical Examination:  /85 (BP Location: Left arm, Patient Position: Sitting, Cuff Size: Large Adult)   Pulse 65   Temp 96.5 °F (35.8 °C) (Temporal)   Ht 165.1 cm (65\")   Wt 119 kg (261 lb 12.8 oz)   LMP  (LMP Unknown)   SpO2 98%   BMI 43.57 kg/m²      General Appearance:  Patient is in no distress.  She is well kept and has an morbidly obese build.   Psychiatric:  Patient with appropriate mood and affect. Alert and oriented to self, time, and place.    Breast, RIGHT:  medium sized, 44-46D, symmetric with the contralateral side.  Breast skin is without erythema, edema, rashes.  There are no visible abnormalities upon inspection during the arm-raising maneuver or with hands on hips in the sitting position. There is no nipple retraction, discharge or nipple/areolar skin changes.There are no masses palpable in the sitting or supine positions.    Breast, LEFT:  medium sized, 44-46D,  symmetric with the contralateral side.  Breast skin is without erythema, edema, rashes.  There are no visible abnormalities upon inspection during the arm-raising maneuver or with hands on hips in the sitting position. There is no nipple retraction, discharge or nipple/areolar skin changes.There are no masses palpable in the sitting or supine positions.  Left breast well healing LOQ radial incision with no erythema, warmth, drainage.      Lymphatic:  There is no axillary, cervical, infraclavicular, or supraclavicular adenopathy bilaterally.  Well-healing left axillary sentinel lymph node biopsy with no erythema warmth or drainage.  No palpable seroma.    Eyes:  Pupils are round and reactive to light.  Cardiovascular:  Heart rate and rhythm are regular.  Respiratory:  Lungs are clear bilaterally with no crackles or wheezes in any lung field.  Gastrointestinal:  Abdomen is soft, nondistended, and nontender. "   Musculoskeletal:  Good strength in all 4 extremities.   There is good range of motion in both shoulders.  Skin:  No new skin lesions or rashes on the skin excluding the breast (see breast exam above).      Imagin2015  [facility]  Helena Romero  IMPRESSION:  Negative screening digital mammogram.  BIRADS: 1, Negative.    2016  [facility]    Radiology  Helena Romero  DIAGNOSTIC BILATERAL MAMMO  HISTORY: intermittent, recurrent left breast pain following trauma one year ago.  Right breast new circumscribed, lobulated 4-5 mm nodule at 7:00, 8 cm back from the nipple.  Symptomatic area indicated by the patient anterior aspect of the left lower outer quadrant is clearly marked. No underlying breast pathology is seen.  BILATERAL BREAST US  Right 7:00 in the left breast 7:00 is performed. Corresponding to the new right-sided mammographic nodule is an ovoid solid or complex cystic 3.6 x 2.8 x 4.8 mm nodule at 7:30, 7 cm back from the nipple. This is considered suspicious.  BIRADS: 4    02/15/2016  [facility]    Radiology  Helena Romero  Exams:  1. Ultrasound guided biopsy, right breast.  2. Post procedure digital mammogram, right breast.  US-Guided BX  Right breast 7:30 position 7 cm from the nipple.  Coil shaped clip. A [14] gauge achieve. 5 passes were performed.  Post-Procedure Digital Mammo  Biopsy clip to be in satisfactory position.    02/15/2019  [facility]    Pathology  Helena Romero  Right Breast, Core BX at 7:30:  - Benign Mammary Tissue, Duct Dilatation and Columnar Cell Changes.    2016  [facility]    Pathology  Helena Romero  Amended Report:  This is radiology-pathology concordant.  Recommend return to bilateral annual screening mammography.    2018  [facility]  Helena Romero  BILATERAL SCREENING MAMMO WITH ROBI  Scattered areas of fibroglandular density.  BIRADS: 1, Negative.    2020  [facility]  MAMMO ROBI SCREENING BILATERAL  Breasts are  almost entirely fatty.  Focal asymmetry in the left breast at 6:00 at posterior depth.  BIRADS: 0    08/10/2020  [facility]    Radiology  Helena Romero  LEFT DIAGNOSTIC MAMMO WITH ROBI  LEFT BREAST US  Family history of breast cancer: niece, at age 38.  9 mm high density mass in the posterior one third lower central left breast. This appears to have increased in size and density when compared to prior images.  Left breast US: No sonographic correlate to the mammographic finding.  BIRADS: 4    Pathology:  09/04/2020  [facility]    Pathology  Helena Romero  MAMMO STEREO BX LEFT BREAST  9 gauge biopsy needle 8 core specimens.  A metallic ribbon shaped biopsy tissue marker was placed.  A left mammogram, obtained immediately post core biopsy, documented sampling of the targeted area. There is a 28 mm associated post biopsy hematoma obscuring the biopsied mass.  ADDENDUM:  Pathology invasive mucinous carcinoma and atypical ductal hyperplasia. This is concordant with imaging findings.    09/04/2020  [facility]    Marker Analysis  Helena Romero  ER - >95%  MO - >95%  HER2/lina ([facility]):  - Negative (score: 0)  Ki-67 Proliferation Index: 5%  Mucinous carcinoma.  Glandular (Acinar)/ Tubular Differentiation: Score 2  Nuclear Pleormorphism: Score 1  Mitotic Rate: Score 1  Overall Grade: Grade 1  Tumor Size: 7.0 mm  Ductal Carcinoma In Situ (DCIS): Not Identified  Additional findings: ATYPICAL DUCTAL HYPERPLASIA    09/17/2020  [facility]   Pathology  Helena Romero  Consult Slides for Review:  2. Left Breast, Lower Central, Posterior One:   E. INVASIVE MUCINOUS CARCINOMA, Well-differentiated; Grade I/III (tubule score =2, nuclear score =1, mitoses score =1), measuring at least 7 mm.  F. Associated atypical ductal hyperplasia.  G. Negative for lymphovascular space invasion in this limited sample.  H. Immunohistochemical studies show:  a. ER - 100%  b. MO - 100%  c. HER2 - Negative (0)  d. Ki-67 - 5%    Procedures:      Assessment:    Diagnosis Plan   1. Carcinoma of lower-outer quadrant of left female breast, unspecified estrogen receptor status (CMS/HCC)  Mammo Screening Digital Tomosynthesis Bilateral With CAD   2. Morbid (severe) obesity due to excess calories (CMS/HCC)     3. Atrial fibrillation, unspecified type (CMS/HCC)     4. FH: breast cancer     5. Family history of melanoma     6. FH: thyroid cancer     7. FH: pancreatic cancer     8. Encounter for screening mammogram for malignant neoplasm of breast   Mammo Screening Digital Tomosynthesis Bilateral With CAD        1-  Left breast 6:00, 4.5 cm from the nipple location of postbiopsy hematoma.  Prebiopsy imaging 9 mm on mammogram, no ultrasound finding.  Patient was unable to tolerate MRI.  7 mm largest measurement in a core.  Invasive mucinous carcinoma, low-grade, 2, 1, 1, associated atypical duct hyperplasia.  No lymphovascular invasion.  Estrogen 100 progesterone 100 HER-2/lina 0 with a Ki-67 of 5%.  Patient did have internal path review.  Clinical stage T1b N0 stage Ia.    November 5, 2020 left breast OTILIA localized lumpectomy and left axillary sentinel lymph node biopsy pathology returned as invasive mucinous carcinoma, 1.2 cm, low-grade, 2, 1, 1, no lymphovascular space invasion, associated duct carcinoma in situ, 6 mm, low-grade cribriform and solid.  All margins are clear with the closest being 4 mm from the inferior margin.  All margins are clear for precancer being 6 mm from the inferior margin.  Additional margins are benign.  0 of 2 sentinel nodes.  Pathologic stage T1CN0 stage Ia.    Dr Goldman- pending  Dr Cartwright- pending    2-  BMI 43    3-  Atrial fibrillatinon Dr Niki trujillo  In sinus rhythm most of time    4-7  Melanoma Sister age 60.  Thyroid cancer same Sister age 60.  Niece, daughter of the above sister, triple negative breast cancer metastatic age 38.  Paternal aunt pancreas cancer  Mother cervical cancer.    [facility] common hereditary panel 10-10-20-  negative for mutation      Plan:  The patient goes by Virginie Andrews and I reviewed her interval history, pathology report and examination together today.  She is healing nicely.  She is back on her Xarelto.  We reviewed her normal genetic testing.  She has a consultation set up with Dr. Goldman and Dr. Cartwright at [facility].  Her next routine mammogram will be due July 29, 2021 at [facility].  I will arrange that and see her back after.  I asked her to continue her self breast exam and to call us in the interim with concerns would be happy to see her back sooner.      Inés Jones MD      Next Appointment:  Return for Next scheduled follow up, after imaging.      EMR Dragon/transcription disclaimer:    Much of this encounter note is an electronic transcription/translocation of spoken language to printed text.  The electronic translation of spoken language may permit erroneous, or at times, nonsensical words or phrases to be inadvertently transcribed.  Although I have reviewed the note from such areas, some may still exist.

## 2020-11-18 ENCOUNTER — TELEPHONE (OUTPATIENT)
Dept: SURGERY | Facility: CLINIC | Age: 70
End: 2020-11-18

## 2020-11-18 NOTE — TELEPHONE ENCOUNTER
Pt called requesting a work release be sent to her employer. Fax: [phone]. Attention Cece Jones released the pt to work 11/17/2020, this information was faxed and pt notified.    Refer to Epic for copy of letter.     CMA

## 2020-11-23 ENCOUNTER — HOSPITAL ENCOUNTER (OUTPATIENT)
Dept: RADIATION ONCOLOGY | Facility: HOSPITAL | Age: 70
Setting detail: RECURRING SERIES
Discharge: STILL ADMITTED OR ADMINISTRATIVE | End: 2020-12-31
Attending: RADIOLOGY

## 2020-11-24 RX ORDER — OMEPRAZOLE 20 MG/1
20 CAPSULE, DELAYED RELEASE ORAL DAILY
Qty: 90 CAPSULE | Refills: 1 | Status: SHIPPED | OUTPATIENT
Start: 2020-11-24 | End: 2021-05-26

## 2020-11-25 LAB
CYTO UR: NORMAL
LAB AP CASE REPORT: NORMAL
LAB AP CLINICAL INFORMATION: NORMAL
LAB AP DIAGNOSIS COMMENT: NORMAL
LAB AP SPECIAL STAINS: NORMAL
LAB AP SYNOPTIC CHECKLIST: NORMAL
Lab: NORMAL
PATH REPORT.ADDENDUM SPEC: NORMAL
PATH REPORT.FINAL DX SPEC: NORMAL
PATH REPORT.GROSS SPEC: NORMAL

## 2020-11-27 ENCOUNTER — TELEPHONE (OUTPATIENT)
Dept: SURGERY | Facility: CLINIC | Age: 70
End: 2020-11-27

## 2020-12-03 ENCOUNTER — OFFICE VISIT CONVERTED (OUTPATIENT)
Dept: ONCOLOGY | Facility: HOSPITAL | Age: 70
End: 2020-12-03
Attending: INTERNAL MEDICINE

## 2020-12-03 ENCOUNTER — HOSPITAL ENCOUNTER (OUTPATIENT)
Dept: ONCOLOGY | Facility: HOSPITAL | Age: 70
Discharge: HOME | End: 2020-12-03
Attending: INTERNAL MEDICINE

## 2020-12-09 ENCOUNTER — TELEPHONE (OUTPATIENT)
Dept: SURGERY | Facility: CLINIC | Age: 70
End: 2020-12-09

## 2020-12-09 NOTE — TELEPHONE ENCOUNTER
Scheduled bilateral screening 3D mammogram at [facility] 7-29-21 arrive 12:00    Return to see Dr. VERDUZCO 8-6-21 arrive 12:15    Baron Dobbins

## 2020-12-10 ENCOUNTER — TELEPHONE (OUTPATIENT)
Dept: INTERNAL MEDICINE | Facility: CLINIC | Age: 70
End: 2020-12-10

## 2020-12-10 NOTE — TELEPHONE ENCOUNTER
Called spoke with PT, she was recently Dx with breat cancer.  When she was at the oncologist office they checked her BP it was 194/98 they was thinking she was anxious about her appointment.    Sine then she has been checking her BP and it is still running high 200/100 178/98 last night she felt really bad with headache so she took two of each her medciations olmesartan and metoprolol. She does feel some better this AM     PT is unable to come in today due to having a radiation treament.    Please advise

## 2020-12-10 NOTE — TELEPHONE ENCOUNTER
PATIENT IS REQUESTING A CALL BACK ABOUT SOME CONCERNS SHE IS HAVING WITH HER BLOOD PRESSURE.    PLEASE ADVISE.    CALL BACK NUMBER: [phone]

## 2020-12-11 ENCOUNTER — OFFICE VISIT (OUTPATIENT)
Dept: INTERNAL MEDICINE | Facility: CLINIC | Age: 70
End: 2020-12-11

## 2020-12-11 VITALS
WEIGHT: 261 LBS | HEIGHT: 65 IN | DIASTOLIC BLOOD PRESSURE: 80 MMHG | BODY MASS INDEX: 43.49 KG/M2 | SYSTOLIC BLOOD PRESSURE: 145 MMHG | TEMPERATURE: 97.1 F

## 2020-12-11 DIAGNOSIS — I10 ESSENTIAL HYPERTENSION: ICD-10-CM

## 2020-12-11 DIAGNOSIS — I10 ESSENTIAL HYPERTENSION: Primary | ICD-10-CM

## 2020-12-11 PROCEDURE — 99213 OFFICE O/P EST LOW 20 MIN: CPT | Performed by: PHYSICIAN ASSISTANT

## 2020-12-11 RX ORDER — HYDROCHLOROTHIAZIDE 12.5 MG/1
12.5 TABLET ORAL DAILY
Qty: 30 TABLET | Refills: 1 | Status: SHIPPED | OUTPATIENT
Start: 2020-12-11 | End: 2020-12-11 | Stop reason: SDUPTHER

## 2020-12-11 RX ORDER — HYDROCHLOROTHIAZIDE 12.5 MG/1
12.5 TABLET ORAL DAILY
Qty: 90 TABLET | Refills: 2 | Status: SHIPPED | OUTPATIENT
Start: 2020-12-11 | End: 2021-09-20

## 2020-12-11 NOTE — PROGRESS NOTES
Subjective   Chief Complaint   Patient presents with   • Hypertension     follow up       History of Present Illness     She saw the oncologist last week and her BP was 198/100 and thought it was due to anxiety.  She has been checking it at home, but states her cuff becomes very very tight. Her numbers have been in the 170's-180's /90s-100. She states 2 days ago she had a headache and felt flushed. She took an extra Benicar that evening and felt better today and yesterday.     She has not had any extra BP meds today or yesterday. She states her legs have also been swollen the last few days.      Patient Active Problem List   Diagnosis   • Essential hypertension   • Hyperlipidemia   • Mitral insufficiency   • Obesity   • Snoring   • Functional diarrhea   • History of small bowel obstruction   • Gastroesophageal reflux disease   • Paroxysmal atrial fibrillation (CMS/HCC)   • Malignant neoplasm of lower-outer quadrant of left breast of female, estrogen receptor positive (CMS/HCC)       Allergies   Allergen Reactions   • Codeine Nausea Only   • Lisinopril Hives       Current Outpatient Medications on File Prior to Visit   Medication Sig Dispense Refill   • Acetaminophen (TYLENOL 8 HOUR ARTHRITIS PAIN PO) Take 650 mg by mouth Daily As Needed.     • albuterol sulfate  (90 Base) MCG/ACT inhaler Inhale 2 puffs Every 4 (Four) Hours As Needed for Wheezing.     • atorvastatin (LIPITOR) 20 MG tablet Take 1 tablet by mouth Daily. 90 tablet 1   • busPIRone (BUSPAR) 15 MG tablet Take 1 tablet by mouth 3 (Three) Times a Day. 270 tablet 1   • clotrimazole (LOTRIMIN) 1 % external solution Apply  topically to the appropriate area as directed 2 (Two) Times a Day.     • diphenhydrAMINE (BENADRYL) 25 mg capsule Take 25 mg by mouth At Night As Needed for Sleep.     • fexofenadine (ALLEGRA) 180 MG tablet Take 180 mg by mouth Daily.     • gabapentin (NEURONTIN) 100 MG capsule TAKE 3 CAPSULES BY MOUTH EVERY NIGHT 90 capsule 1   •  HYDROcodone-acetaminophen (NORCO) 5-325 MG per tablet 1tab po q 4-6hr prn pain . Stagger if using other sedating medications such as valium. Do not take tylenol in addition to lortab. (Patient taking differently: Take 1 tablet by mouth Every 4 (Four) Hours As Needed. 1tab po q 4-6hr prn pain . Stagger if using other sedating medications such as valium. Do not take tylenol in addition to lortab.) 20 tablet 0   • metoprolol succinate XL (TOPROL-XL) 25 MG 24 hr tablet Take 1 tablet by mouth Daily. (Patient taking differently: Take 25 mg by mouth 2 (two) times a day.) 90 tablet 3   • olmesartan (BENICAR) 40 MG tablet TAKE 1 TABLET BY MOUTH DAILY 90 tablet 1   • omeprazole (priLOSEC) 20 MG capsule TAKE 1 CAPSULE BY MOUTH DAILY 90 capsule 1   • ondansetron (ZOFRAN) 4 MG tablet Take 1 tablet by mouth Every 8 (Eight) Hours As Needed for Nausea or Vomiting. May take 8 mg po q8hr prn is 4mg is not effective 10 tablet 1   • polyethylene glycol (MiraLax) 17 GM/SCOOP powder Take 17 g by mouth Daily. Take cap of powder with 8oz water daily surrounding surgery and while on narcotic 119 g 0   • rivaroxaban (XARELTO) 20 MG tablet Take 20 mg by mouth Daily.       No current facility-administered medications on file prior to visit.        Past Medical History:   Diagnosis Date   • Acute bronchitis    • Allergic    • Anesthesia complication     PATIENT STAYS SLEEPY A LONGTIME AFTER SURGERY   • Anxiety    • Asthma    • Atrial fibrillation (CMS/HCC)    • Borderline hyperglycemia    • Breast cancer (CMS/HCC)    • Cat bite    • Depression    • Diarrhea    • GERD (gastroesophageal reflux disease)    • Hypertension    • Hypocalcemia    • Insomnia    • Low back pain    • Low back pain, episodic    • Need for diphtheria-tetanus-pertussis (Tdap) vaccine    • Neuromuscular disorder (CMS/HCC)    • Obesity    • Osteoarthritis, generalized    • Panic attack    • Pneumonia     x 3   • Seasonal allergies    • Sleep disturbances    • Trochanteric  bursitis    • Vaginal candidiasis    • Yeast infection        Family History   Problem Relation Age of Onset   • Pancreatic cancer Paternal Aunt    • Cervical cancer Mother 41   • Melanoma Sister 60   • Thyroid cancer Sister 60   • Breast cancer Niece 38        TRIPLE -   • Malig Hyperthermia Neg Hx        Social History     Socioeconomic History   • Marital status:      Spouse name: Not on file   • Number of children: Not on file   • Years of education: Not on file   • Highest education level: Not on file   Tobacco Use   • Smoking status: Never Smoker   • Smokeless tobacco: Never Used   Substance and Sexual Activity   • Alcohol use: No     Frequency: Never   • Drug use: No   • Sexual activity: Defer       Past Surgical History:   Procedure Laterality Date   • BREAST BIOPSY Left 2020    MALIGNANT   • BREAST LUMPECTOMY WITH SENTINEL NODE BIOPSY Left 11/5/2020    Procedure: Left breast charley localized lumpectomy and left axillary sentinel lymph node biopsy;  Surgeon: Inés Jones MD;  Location: [facility];  Service: General;  Laterality: Left;   • CERVICAL CONIZATION, LEEP     • CHOLECYSTECTOMY     • COLONOSCOPY  approx 2011    normal per patient-repeat 10 years   • DILATION AND CURETTAGE, DIAGNOSTIC / THERAPEUTIC     • TONSILLECTOMY     • TUBAL ABDOMINAL LIGATION           The following portions of the patient's history were reviewed and updated as appropriate: problem list, allergies, current medications, past medical history, past family history, past social history and past surgical history.    Review of Systems   Cardiovascular: Positive for leg swelling. Negative for chest pain and dyspnea on exertion.       Immunization History   Administered Date(s) Administered   • FLUAD TRI 65YR+ 10/01/2019   • Fluad Quad 65+ 10/01/2019   • Fluzone High Dose =>65 Years (Vaxcare ONLY) 01/01/2016, 11/02/2017, 11/01/2018   • Pneumococcal Conjugate 13-Valent (PCV13) 11/02/2017   • Pneumococcal Polysaccharide  "(PPSV23) 01/06/2015   • Tdap 06/26/2017       Objective   Vitals:    12/11/20 1527 12/11/20 1541 12/11/20 1542   BP:  135/80 145/80   Cuff Size:  Thigh Adult Thigh Adult   Temp: 97.1 °F (36.2 °C)     Weight: 118 kg (261 lb)     Height: 165.1 cm (65\")       Body mass index is 43.43 kg/m².  Physical Exam  Vitals signs reviewed.   Constitutional:       Appearance: Normal appearance.   HENT:      Head: Normocephalic and atraumatic.   Cardiovascular:      Rate and Rhythm: Normal rate and regular rhythm.      Heart sounds: Normal heart sounds.      Comments: Pitting edema to mid shin.   Neurological:      Mental Status: She is alert.           Assessment/Plan   Diagnoses and all orders for this visit:    1. Essential hypertension (Primary)  -     hydroCHLOROthiazide (HYDRODIURIL) 12.5 MG tablet; Take 1 tablet by mouth Daily.  Dispense: 30 tablet; Refill: 1    Add HCTZ 12.5 mg and recheck her BP in 1 week with a [facility] same day.     Return in about 1 week (around 12/18/2020) for Krystal on Friday.           "

## 2020-12-18 ENCOUNTER — OFFICE VISIT (OUTPATIENT)
Dept: INTERNAL MEDICINE | Facility: CLINIC | Age: 70
End: 2020-12-18

## 2020-12-18 VITALS
HEIGHT: 65 IN | WEIGHT: 263 LBS | TEMPERATURE: 97 F | BODY MASS INDEX: 43.82 KG/M2 | SYSTOLIC BLOOD PRESSURE: 117 MMHG | DIASTOLIC BLOOD PRESSURE: 76 MMHG

## 2020-12-18 DIAGNOSIS — I10 ESSENTIAL HYPERTENSION: Primary | ICD-10-CM

## 2020-12-18 PROCEDURE — 99213 OFFICE O/P EST LOW 20 MIN: CPT | Performed by: PHYSICIAN ASSISTANT

## 2020-12-18 NOTE — PROGRESS NOTES
Subjective   Chief Complaint   Patient presents with   • Hypertension     follow up       History of Present Illness      Pt is here today for 1 week fup. She is tolerating the HCTZ well, swelling is improving. Her BP has been normal since starting it. She feels much better.     Patient Active Problem List   Diagnosis   • Essential hypertension   • Hyperlipidemia   • Mitral insufficiency   • Obesity   • Snoring   • Functional diarrhea   • History of small bowel obstruction   • Gastroesophageal reflux disease   • Paroxysmal atrial fibrillation (CMS/HCC)   • Malignant neoplasm of lower-outer quadrant of left breast of female, estrogen receptor positive (CMS/HCC)       Allergies   Allergen Reactions   • Codeine Nausea Only   • Lisinopril Hives       Current Outpatient Medications on File Prior to Visit   Medication Sig Dispense Refill   • Acetaminophen (TYLENOL 8 HOUR ARTHRITIS PAIN PO) Take 650 mg by mouth Daily As Needed.     • albuterol sulfate  (90 Base) MCG/ACT inhaler Inhale 2 puffs Every 4 (Four) Hours As Needed for Wheezing.     • atorvastatin (LIPITOR) 20 MG tablet Take 1 tablet by mouth Daily. 90 tablet 1   • busPIRone (BUSPAR) 15 MG tablet Take 1 tablet by mouth 3 (Three) Times a Day. 270 tablet 1   • clotrimazole (LOTRIMIN) 1 % external solution Apply  topically to the appropriate area as directed 2 (Two) Times a Day.     • diphenhydrAMINE (BENADRYL) 25 mg capsule Take 25 mg by mouth At Night As Needed for Sleep.     • fexofenadine (ALLEGRA) 180 MG tablet Take 180 mg by mouth Daily.     • gabapentin (NEURONTIN) 100 MG capsule TAKE 3 CAPSULES BY MOUTH EVERY NIGHT 90 capsule 1   • hydroCHLOROthiazide (HYDRODIURIL) 12.5 MG tablet TAKE 1 TABLET BY MOUTH DAILY 90 tablet 2   • HYDROcodone-acetaminophen (NORCO) 5-325 MG per tablet 1tab po q 4-6hr prn pain . Stagger if using other sedating medications such as valium. Do not take tylenol in addition to lortab. (Patient taking differently: Take 1 tablet by  mouth Every 4 (Four) Hours As Needed. 1tab po q 4-6hr prn pain . Stagger if using other sedating medications such as valium. Do not take tylenol in addition to lortab.) 20 tablet 0   • metoprolol succinate XL (TOPROL-XL) 25 MG 24 hr tablet Take 1 tablet by mouth Daily. (Patient taking differently: Take 25 mg by mouth 2 (two) times a day.) 90 tablet 3   • olmesartan (BENICAR) 40 MG tablet TAKE 1 TABLET BY MOUTH DAILY 90 tablet 1   • omeprazole (priLOSEC) 20 MG capsule TAKE 1 CAPSULE BY MOUTH DAILY 90 capsule 1   • ondansetron (ZOFRAN) 4 MG tablet Take 1 tablet by mouth Every 8 (Eight) Hours As Needed for Nausea or Vomiting. May take 8 mg po q8hr prn is 4mg is not effective 10 tablet 1   • polyethylene glycol (MiraLax) 17 GM/SCOOP powder Take 17 g by mouth Daily. Take cap of powder with 8oz water daily surrounding surgery and while on narcotic 119 g 0   • rivaroxaban (XARELTO) 20 MG tablet Take 20 mg by mouth Daily.     • [DISCONTINUED] hydroCHLOROthiazide (HYDRODIURIL) 12.5 MG tablet Take 1 tablet by mouth Daily. 30 tablet 1     No current facility-administered medications on file prior to visit.        Past Medical History:   Diagnosis Date   • Acute bronchitis    • Allergic    • Anesthesia complication     PATIENT STAYS SLEEPY A LONGTIME AFTER SURGERY   • Anxiety    • Asthma    • Atrial fibrillation (CMS/HCC)    • Borderline hyperglycemia    • Breast cancer (CMS/HCC)    • Cat bite    • Depression    • Diarrhea    • GERD (gastroesophageal reflux disease)    • Hypertension    • Hypocalcemia    • Insomnia    • Low back pain    • Low back pain, episodic    • Need for diphtheria-tetanus-pertussis (Tdap) vaccine    • Neuromuscular disorder (CMS/HCC)    • Obesity    • Osteoarthritis, generalized    • Panic attack    • Pneumonia     x 3   • Seasonal allergies    • Sleep disturbances    • Trochanteric bursitis    • Vaginal candidiasis    • Yeast infection        Family History   Problem Relation Age of Onset   • Pancreatic  cancer Paternal Aunt    • Cervical cancer Mother 41   • Melanoma Sister 60   • Thyroid cancer Sister 60   • Breast cancer Niece 38        TRIPLE -   • Malig Hyperthermia Neg Hx        Social History     Socioeconomic History   • Marital status:      Spouse name: Not on file   • Number of children: Not on file   • Years of education: Not on file   • Highest education level: Not on file   Tobacco Use   • Smoking status: Never Smoker   • Smokeless tobacco: Never Used   Substance and Sexual Activity   • Alcohol use: No     Frequency: Never   • Drug use: No   • Sexual activity: Defer       Past Surgical History:   Procedure Laterality Date   • BREAST BIOPSY Left 2020    MALIGNANT   • BREAST LUMPECTOMY WITH SENTINEL NODE BIOPSY Left 11/5/2020    Procedure: Left breast charley localized lumpectomy and left axillary sentinel lymph node biopsy;  Surgeon: Inés Jones MD;  Location: [facility];  Service: General;  Laterality: Left;   • CERVICAL CONIZATION, LEEP     • CHOLECYSTECTOMY     • COLONOSCOPY  approx 2011    normal per patient-repeat 10 years   • DILATION AND CURETTAGE, DIAGNOSTIC / THERAPEUTIC     • TONSILLECTOMY     • TUBAL ABDOMINAL LIGATION           The following portions of the patient's history were reviewed and updated as appropriate: problem list, allergies, current medications, past medical history, past family history, past social history and past surgical history.    Review of Systems   Cardiovascular: Positive for leg swelling.       Immunization History   Administered Date(s) Administered   • FLUAD TRI 65YR+ 10/01/2019   • Fluad Quad 65+ 10/01/2019   • Fluzone High Dose =>65 Years (Vaxcare ONLY) 01/01/2016, 11/02/2017, 11/01/2018   • Pneumococcal Conjugate 13-Valent (PCV13) 11/02/2017   • Pneumococcal Polysaccharide (PPSV23) 01/06/2015   • Tdap 06/26/2017       Objective   Vitals:    12/18/20 1523 12/18/20 1538   BP:  117/76   Temp: 97 °F (36.1 °C)    Weight: 119 kg (263 lb)    Height:  "165.1 cm (65\")      Body mass index is 43.77 kg/m².  Physical Exam  Vitals signs reviewed.   Constitutional:       Appearance: Normal appearance.   HENT:      Head: Atraumatic.   Cardiovascular:      Rate and Rhythm: Normal rate and regular rhythm.   Pulmonary:      Effort: Pulmonary effort is normal.      Breath sounds: Normal breath sounds.   Neurological:      Mental Status: She is alert.       Assessment/Plan   Diagnoses and all orders for this visit:    1. Essential hypertension (Primary)    BP is great, check BMP today. Keep fup with Demond at end of January.     Return for Lab Today.           "

## 2020-12-19 LAB
ALBUMIN SERPL-MCNC: 4.1 G/DL (ref 3.5–5.2)
ALBUMIN/GLOB SERPL: 1.5 G/DL
ALP SERPL-CCNC: 133 U/L (ref 39–117)
ALT SERPL-CCNC: 28 U/L (ref 1–33)
AST SERPL-CCNC: 24 U/L (ref 1–32)
BILIRUB SERPL-MCNC: 0.5 MG/DL (ref 0–1.2)
BUN SERPL-MCNC: 9 MG/DL (ref 8–23)
BUN/CREAT SERPL: 10.1 (ref 7–25)
CALCIUM SERPL-MCNC: 9 MG/DL (ref 8.6–10.5)
CHLORIDE SERPL-SCNC: 99 MMOL/L (ref 98–107)
CHOLEST SERPL-MCNC: 137 MG/DL (ref 0–200)
CO2 SERPL-SCNC: 31.5 MMOL/L (ref 22–29)
CREAT SERPL-MCNC: 0.89 MG/DL (ref 0.57–1)
GLOBULIN SER CALC-MCNC: 2.7 GM/DL
GLUCOSE SERPL-MCNC: 99 MG/DL (ref 65–99)
HDLC SERPL-MCNC: 61 MG/DL (ref 40–60)
LDLC SERPL CALC-MCNC: 52 MG/DL (ref 0–100)
POTASSIUM SERPL-SCNC: 3.1 MMOL/L (ref 3.5–5.2)
PROT SERPL-MCNC: 6.8 G/DL (ref 6–8.5)
SODIUM SERPL-SCNC: 140 MMOL/L (ref 136–145)
TRIGL SERPL-MCNC: 142 MG/DL (ref 0–150)
VLDLC SERPL CALC-MCNC: 24 MG/DL (ref 5–40)

## 2020-12-30 ENCOUNTER — HOSPITAL ENCOUNTER (OUTPATIENT)
Dept: MAMMOGRAPHY | Facility: HOSPITAL | Age: 70
Discharge: HOME | End: 2020-12-30
Attending: INTERNAL MEDICINE

## 2021-01-09 ENCOUNTER — TELEPHONE (OUTPATIENT)
Dept: OTHER | Facility: HOSPITAL | Age: 71
End: 2021-01-09

## 2021-01-09 NOTE — TELEPHONE ENCOUNTER
Called Ms. Romero to see how she was doing. She stated she just finished radiation and is doing well. She had a questions about the COVID vaccine and I sent a message to Dr. Jones to clarify for the patient. Otherwise she is doing well and has no needs. She was thankful for the call and will reach out if any questions or needs arise.

## 2021-01-11 ENCOUNTER — TELEPHONE (OUTPATIENT)
Dept: SURGERY | Facility: CLINIC | Age: 71
End: 2021-01-11

## 2021-01-11 NOTE — TELEPHONE ENCOUNTER
Note from Dr. 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  Patient underwent lumpectomy sentinel node.  Recommend 5 years of antihormonal therapy.  She will start anastrozole in the coming days.  She will start radiation with Dr. Goldman in the upcoming week.

## 2021-01-11 NOTE — TELEPHONE ENCOUNTER
Okay to get COVID vaccine from a surgical standpoint. I had to leave patient a voicemail.

## 2021-01-14 ENCOUNTER — HOSPITAL ENCOUNTER (OUTPATIENT)
Dept: RADIATION ONCOLOGY | Facility: HOSPITAL | Age: 71
Discharge: HOME | End: 2021-01-14
Attending: NURSE PRACTITIONER

## 2021-01-21 RX ORDER — ATORVASTATIN CALCIUM 20 MG/1
20 TABLET, FILM COATED ORAL DAILY
Qty: 90 TABLET | Refills: 1 | Status: SHIPPED | OUTPATIENT
Start: 2021-01-21 | End: 2021-07-19

## 2021-01-22 ENCOUNTER — HOSPITAL ENCOUNTER (OUTPATIENT)
Dept: ONCOLOGY | Facility: HOSPITAL | Age: 71
Discharge: HOME | End: 2021-01-22
Attending: INTERNAL MEDICINE

## 2021-01-22 ENCOUNTER — OFFICE VISIT CONVERTED (OUTPATIENT)
Dept: ONCOLOGY | Facility: HOSPITAL | Age: 71
End: 2021-01-22
Attending: INTERNAL MEDICINE

## 2021-01-25 ENCOUNTER — OFFICE VISIT (OUTPATIENT)
Dept: INTERNAL MEDICINE | Facility: CLINIC | Age: 71
End: 2021-01-25

## 2021-01-25 VITALS
SYSTOLIC BLOOD PRESSURE: 118 MMHG | HEIGHT: 65 IN | HEART RATE: 74 BPM | TEMPERATURE: 97 F | DIASTOLIC BLOOD PRESSURE: 74 MMHG | WEIGHT: 256 LBS | BODY MASS INDEX: 42.65 KG/M2

## 2021-01-25 DIAGNOSIS — Z12.11 SCREEN FOR COLON CANCER: ICD-10-CM

## 2021-01-25 DIAGNOSIS — I10 ESSENTIAL HYPERTENSION: Primary | ICD-10-CM

## 2021-01-25 DIAGNOSIS — C50.512 MALIGNANT NEOPLASM OF LOWER-OUTER QUADRANT OF LEFT BREAST OF FEMALE, ESTROGEN RECEPTOR POSITIVE (HCC): ICD-10-CM

## 2021-01-25 DIAGNOSIS — Z17.0 MALIGNANT NEOPLASM OF LOWER-OUTER QUADRANT OF LEFT BREAST OF FEMALE, ESTROGEN RECEPTOR POSITIVE (HCC): ICD-10-CM

## 2021-01-25 DIAGNOSIS — E78.49 OTHER HYPERLIPIDEMIA: ICD-10-CM

## 2021-01-25 PROCEDURE — 99214 OFFICE O/P EST MOD 30 MIN: CPT | Performed by: INTERNAL MEDICINE

## 2021-01-25 RX ORDER — METOPROLOL SUCCINATE 25 MG/1
25 TABLET, EXTENDED RELEASE ORAL 2 TIMES DAILY
Qty: 180 TABLET | Refills: 1 | COMMUNITY
Start: 2021-01-25

## 2021-01-25 RX ORDER — ANASTROZOLE 1 MG/1
1 TABLET ORAL DAILY
COMMUNITY
Start: 2020-12-04 | End: 2021-08-13 | Stop reason: ALTCHOICE

## 2021-01-25 NOTE — PROGRESS NOTES
The ABCs of the Annual Wellness Visit  Subsequent Medicare Wellness Visit    Chief Complaint   Patient presents with   • Medicare Wellness-subsequent       Subjective   History of Present Illness:  Helena Romero is a 70 y.o. female who presents for a Subsequent Medicare Wellness Visit. Breast surgery/onc done- she's doing great with great prognosis.  She has gone back to work- working from home.     HEALTH RISK ASSESSMENT    Recent Hospitalizations:  Recently treated at the following:  [facility]    Current Medical Providers:  Patient Care Team:  Ally Lagos MD as PCP - General (Internal Medicine)    Smoking Status:  Social History     Tobacco Use   Smoking Status Never Smoker   Smokeless Tobacco Never Used       Alcohol Consumption:  Social History     Substance and Sexual Activity   Alcohol Use No   • Frequency: Never       Depression Screen:   PHQ-2/PHQ-9 Depression Screening 1/25/2021   Little interest or pleasure in doing things 0   Feeling down, depressed, or hopeless 0   Trouble falling or staying asleep, or sleeping too much 0   Feeling tired or having little energy 0   Poor appetite or overeating 0   Feeling bad about yourself - or that you are a failure or have let yourself or your family down 0   Trouble concentrating on things, such as reading the newspaper or watching television 0   Moving or speaking so slowly that other people could have noticed. Or the opposite - being so fidgety or restless that you have been moving around a lot more than usual 0   Thoughts that you would be better off dead, or of hurting yourself in some way 0   Total Score 0       Fall Risk Screen:  TRACIEADI Fall Risk Assessment was completed, and patient is at LOW risk for falls.Assessment completed on:1/25/2021    Health Habits and Functional and Cognitive Screening:  Functional & Cognitive Status 1/25/2021   Do you have difficulty preparing food and eating? No   Do you have difficulty bathing yourself, getting  dressed or grooming yourself? No   Do you have difficulty using the toilet? No   Do you have difficulty moving around from place to place? No   Do you have trouble with steps or getting out of a bed or a chair? No   Current Diet Limited Junk Food   Dental Exam Not up to date   Eye Exam Not up to date   Exercise (times per week) 0 times per week   Current Exercises Include No Regular Exercise   Do you need help using the phone?  No   Are you deaf or do you have serious difficulty hearing?  No   Do you need help with transportation? No   Do you need help shopping? No   Do you need help preparing meals?  No   Do you need help with housework?  No   Do you need help with laundry? No   Do you need help taking your medications? No   Do you need help managing money? No   Do you ever drive or ride in a car without wearing a seat belt? No   Have you felt unusual stress, anger or loneliness in the last month? No   Who do you live with? Spouse   If you need help, do you have trouble finding someone available to you? No   Have you been bothered in the last four weeks by sexual problems? No   Do you have difficulty concentrating, remembering or making decisions? No         Does the patient have evidence of cognitive impairment? No    Asprin use counseling:Does not need ASA (and currently is not on it)    Age-appropriate Screening Schedule:  Refer to the list below for future screening recommendations based on patient's age, sex and/or medical conditions. Orders for these recommended tests are listed in the plan section. The patient has been provided with a written plan.    Health Maintenance   Topic Date Due   • COLONOSCOPY  1950   • ZOSTER VACCINE (1 of 2) 03/15/1998   • MAMMOGRAM  10/26/2021   • LIPID PANEL  12/18/2021   • TDAP/TD VACCINES (2 - Td) 06/26/2027   • INFLUENZA VACCINE  Completed          The following portions of the patient's history were reviewed and updated as appropriate: allergies, current medications,  past family history, past medical history, past social history, past surgical history and problem list.    Outpatient Medications Prior to Visit   Medication Sig Dispense Refill   • Acetaminophen (TYLENOL 8 HOUR ARTHRITIS PAIN PO) Take 650 mg by mouth Daily As Needed.     • anastrozole (ARIMIDEX) 1 MG tablet Take 1 mg by mouth Daily.     • atorvastatin (LIPITOR) 20 MG tablet TAKE 1 TABLET BY MOUTH DAILY 90 tablet 1   • busPIRone (BUSPAR) 15 MG tablet Take 1 tablet by mouth 3 (Three) Times a Day. 270 tablet 1   • diphenhydrAMINE (BENADRYL) 25 mg capsule Take 25 mg by mouth At Night As Needed for Sleep.     • fexofenadine (ALLEGRA) 180 MG tablet Take 180 mg by mouth Daily.     • gabapentin (NEURONTIN) 100 MG capsule TAKE 3 CAPSULES BY MOUTH EVERY NIGHT 90 capsule 1   • hydroCHLOROthiazide (HYDRODIURIL) 12.5 MG tablet TAKE 1 TABLET BY MOUTH DAILY 90 tablet 2   • metoprolol succinate XL (TOPROL-XL) 25 MG 24 hr tablet Take 1 tablet by mouth Daily. (Patient taking differently: Take 25 mg by mouth 2 (two) times a day.) 90 tablet 3   • olmesartan (BENICAR) 40 MG tablet TAKE 1 TABLET BY MOUTH DAILY 90 tablet 1   • omeprazole (priLOSEC) 20 MG capsule TAKE 1 CAPSULE BY MOUTH DAILY 90 capsule 1   • polyethylene glycol (MiraLax) 17 GM/SCOOP powder Take 17 g by mouth Daily. Take cap of powder with 8oz water daily surrounding surgery and while on narcotic 119 g 0   • rivaroxaban (XARELTO) 20 MG tablet Take 20 mg by mouth Daily.     • albuterol sulfate  (90 Base) MCG/ACT inhaler Inhale 2 puffs Every 4 (Four) Hours As Needed for Wheezing.     • clotrimazole (LOTRIMIN) 1 % external solution Apply  topically to the appropriate area as directed 2 (Two) Times a Day.     • HYDROcodone-acetaminophen (NORCO) 5-325 MG per tablet 1tab po q 4-6hr prn pain . Stagger if using other sedating medications such as valium. Do not take tylenol in addition to lortab. (Patient taking differently: Take 1 tablet by mouth Every 4 (Four) Hours As  "Needed. 1tab po q 4-6hr prn pain . Stagger if using other sedating medications such as valium. Do not take tylenol in addition to lortab.) 20 tablet 0   • ondansetron (ZOFRAN) 4 MG tablet Take 1 tablet by mouth Every 8 (Eight) Hours As Needed for Nausea or Vomiting. May take 8 mg po q8hr prn is 4mg is not effective 10 tablet 1     No facility-administered medications prior to visit.        Patient Active Problem List   Diagnosis   • Essential hypertension   • Hyperlipidemia   • Mitral insufficiency   • Obesity   • Snoring   • Functional diarrhea   • History of small bowel obstruction   • Gastroesophageal reflux disease   • Paroxysmal atrial fibrillation (CMS/HCC)   • Malignant neoplasm of lower-outer quadrant of left breast of female, estrogen receptor positive (CMS/HCC)       Advanced Care Planning:  ACP discussion was held with the patient during this visit. Patient does not have an advance directive, information provided.    Review of Systems    Compared to one year ago, the patient feels her physical health is better.  Compared to one year ago, the patient feels her mental health is the same.    Reviewed chart for potential of high risk medication in the elderly: yes  Reviewed chart for potential of harmful drug interactions in the elderly:yes    Objective         Vitals:    01/25/21 0903   Temp: 97 °F (36.1 °C)   Weight: 116 kg (256 lb)   Height: 165.1 cm (65\")       Body mass index is 42.6 kg/m².  Discussed the patient's BMI with her. The BMI is above average; BMI management plan is completed.    Physical Exam  Constitutional:       Appearance: Normal appearance. She is obese.   Cardiovascular:      Rate and Rhythm: Normal rate and regular rhythm.   Pulmonary:      Effort: Pulmonary effort is normal.      Breath sounds: Normal breath sounds.   Musculoskeletal:      Right lower leg: Right lower leg edema: mild.      Left lower leg: Left lower leg edema: mild.   Psychiatric:         Mood and Affect: Mood normal. "         Behavior: Behavior normal.         Thought Content: Thought content normal.         Judgment: Judgment normal.         Lab Results   Component Value Date    GLU 99 12/18/2020    CHLPL 137 12/18/2020    TRIG 142 12/18/2020    HDL 61 (H) 12/18/2020    LDL 52 12/18/2020    VLDL 24 12/18/2020        Assessment/Plan   Medicare Risks and Personalized Health Plan  CMS Preventative Services Quick Reference  Immunizations Discussed/Encouraged (specific immunizations; Shingrix )    The above risks/problems have been discussed with the patient.  Pertinent information has been shared with the patient in the After Visit Summary.  Follow up plans and orders are seen below in the Assessment/Plan Section.    There are no diagnoses linked to this encounter.  Follow Up:  No follow-ups on file.     An After Visit Summary and PPPS were given to the patient.

## 2021-02-03 RX ORDER — OLMESARTAN MEDOXOMIL 40 MG/1
40 TABLET ORAL DAILY
Qty: 90 TABLET | Refills: 1 | Status: SHIPPED | OUTPATIENT
Start: 2021-02-03 | End: 2021-08-02

## 2021-03-01 ENCOUNTER — TELEPHONE (OUTPATIENT)
Dept: SURGERY | Facility: CLINIC | Age: 71
End: 2021-03-01

## 2021-03-23 ENCOUNTER — TELEPHONE (OUTPATIENT)
Dept: INTERNAL MEDICINE | Facility: CLINIC | Age: 71
End: 2021-03-23

## 2021-03-23 DIAGNOSIS — M19.91 PRIMARY OSTEOARTHRITIS, UNSPECIFIED SITE: ICD-10-CM

## 2021-03-23 RX ORDER — GABAPENTIN 100 MG/1
300 CAPSULE ORAL NIGHTLY
Qty: 90 CAPSULE | Refills: 0 | Status: SHIPPED | OUTPATIENT
Start: 2021-03-23 | End: 2021-05-22

## 2021-03-23 NOTE — TELEPHONE ENCOUNTER
Caller: Romero Helena S    Relationship: Self    Best call back number: [phone]    Medication needed:   Requested Prescriptions     Pending Prescriptions Disp Refills   • gabapentin (NEURONTIN) 100 MG capsule 90 capsule 1     Sig: Take 3 capsules by mouth Every Night.       When do you need the refill by: 03/24/21    What additional details did the patient provide when requesting the medication: PATIENT HAS ONE DAY LEFT OF MEDICATION    Does the patient have less than a 3 day supply:  [x] Yes  [] No    What is the patient's preferred pharmacy: [id number]  [place], [place] - [address] AT [facility] [address] - [phone] [facility] [phone]

## 2021-05-21 DIAGNOSIS — M19.91 PRIMARY OSTEOARTHRITIS, UNSPECIFIED SITE: ICD-10-CM

## 2021-05-22 RX ORDER — GABAPENTIN 100 MG/1
300 CAPSULE ORAL NIGHTLY
Qty: 90 CAPSULE | Refills: 1 | Status: SHIPPED | OUTPATIENT
Start: 2021-05-22 | End: 2021-10-19

## 2021-05-26 RX ORDER — OMEPRAZOLE 20 MG/1
20 CAPSULE, DELAYED RELEASE ORAL DAILY
Qty: 90 CAPSULE | Refills: 1 | Status: SHIPPED | OUTPATIENT
Start: 2021-05-26 | End: 2021-09-02

## 2021-05-28 VITALS
BODY MASS INDEX: 45.24 KG/M2 | DIASTOLIC BLOOD PRESSURE: 92 MMHG | WEIGHT: 264.99 LBS | OXYGEN SATURATION: 99 % | HEART RATE: 100 BPM | RESPIRATION RATE: 18 BRPM | OXYGEN SATURATION: 98 % | TEMPERATURE: 96.8 F | RESPIRATION RATE: 18 BRPM | SYSTOLIC BLOOD PRESSURE: 135 MMHG | TEMPERATURE: 97 F | SYSTOLIC BLOOD PRESSURE: 194 MMHG | BODY MASS INDEX: 42.94 KG/M2 | HEIGHT: 64 IN | WEIGHT: 258.01 LBS | DIASTOLIC BLOOD PRESSURE: 73 MMHG | HEART RATE: 73 BPM

## 2021-05-28 NOTE — PROGRESS NOTES
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  ER/VA positive at 95%, HER-    2 negative (0 by IHC), Ki-67 approximately 5%.      -Consult slide review at [facility] on 2020 showed     essentially identical results.      -2020: Lumpectomy with sentinel lymph node biopsy on the left.  There is a     12 mm area of invasive mucinous carcinoma and a 6 mm area of low-grade DCIS.      Margins negative.      -Completed radiation therapy (Dr. Goldman)      -[facility] genetic testing done at [facility] was negative per     patient report.      -Anastrozole started 12/3/2020            Osteopenia:      -DEXA scan on 2020      -On vitamin D supplement, was told not to take calcium since SBO            HPI - Oncology Interim      Patient comes in today for follow-up of mucinous breast cancer with associated     DCIS.  She was recently started on anastrozole and is tolerating it well.  She     initially had a bit of a headache but that resolved.  She has also completed     radiation therapy.  We discussed her health in general.  We reviewed the results    of her recent bone density test which shows that she has osteopenia. She  tells     me she was not taking calcium and vitamin D because she was told to stop taking     these after having a small bowel obstruction in the past.  Encourage her to at     least take vitamin D.            Clinical Staging      T1BN0, stage Ia            ECOG Performance Status      1            PAST, FAMILY   Past Medical History      Past Medical History:  Arthritis, High Cholesterol, Hypertension      Other PMH:        AFIB      Hematology/Oncology (F):  Breast Cancer            Past Surgical History      Lumpectomy (LEFT)            Family History      Family History:  Breast Cancer (NIECE), Skin Cancer (MOTHER, SISTER), Thyroid     Cancer (SISTER)            Pancreatic cancer in paternal aunt            Social History      Marital Status:        Lives independently:  Yes      Number of Children:  1      Occupation:  CERTIFIED MEDICAL CODER            Tobacco Use      Tobacco status:  Never smoker      Currently Vaping:  No            Alcohol Use      Alcohol intake:  None            Substance Use      Substance use:  Denies use            REVIEW OF SYSTEMS      General:  Admits: Fatigue;          Denies: Appetite Change, Fever, Night Sweats, Weight Gain, Weight Loss      Eye:  Denies Blurred Vision, Denies Corrective Lenses, Denies Diplopia, Denies     Vision Changes      ENT:  Denies Headache, Denies Hearing Loss, Denies Hoarseness, Denies Sore     Throat      Cardiovascular:  Denies Chest Pain, Denies Palpitations      Respiratory:  Denies: Cough, Coughing Blood, Productive Cough, Shortness of Air,    Wheezing      Gastrointestinal:  Denies Bloody Stools, Denies Constipation, Denies Diarrhea,     Denies Nausea/Vomiting, Denies Problem Swallowing, Denies Unable to Control     Bowels      Genitourinary:  Denies Blood in Urine, Denies Incontinence, Denies Painful     Urination      Musculoskeletal:  Denies Back Pain, Denies Muscle Pain, Denies Painful Joints      Other      R HIP PAIN       Integumentary:  Denies Itching, Denies Lesions, Denies Rash      Neurologic:  Denies Dizziness, Denies Numbness\Tingling, Denies Seizures      Psychiatric:  Denies Anxiety, Denies Depression      Other      SOME MEMORY LOSS      Endocrine:  Denies Cold Intolerance, Denies Heat Intolerance      Hematologic/Lymphatic:  Admits Bruising; Denies Bleeding, Denies Enlarged Lymph     Nodes      Reproductive:  Denies: Menopause, Heavy Periods, Pregnant, Still Menstruating            VITAL SIGNS AND SCORES      Vitals      Weight 258 lbs 0.200 oz / 117.882460 kg      Temperature 96.8 F / 36 C - Temporal      Pulse 73      Respirations 18      Blood Pressure 135/73 Sitting, Left Arm      Pulse Oximetry 98%, RM AIR            Pain Score      Experiencing any pain?:  Yes      Pain Scale Used:  Numerical      Pain Intensity:  0      Pain Comment:        HIP PAIN AT NIGHT            Fatigue Score      Experiencing any fatigue?:  Yes      Fatigue (0-10 scale):  3            EXAM      General: Alert, cooperative, no acute distress      Eyes: Anicteric sclera, PERRLA      Respiratory: CTAB, normal respiratory effort      Abdomen: Normal active bowel sounds, no tenderness, no distention      Cardiovascular: RRR, no murmur, no lower extremity edema      Skin: Normal tone, no rash, no lesions      Psychiatric: Appropriate affect, intact judgment      Neurologic: No focal sensory or motor deficits, no weakness, numbness, dizziness      Musculoskeletal: Normal muscle strength and tone      Extremities: No clubbing, cyanosis, or deformities            PREVENTION      Hx Influenza Vaccination:  Yes      Date Influenza Vaccine Given:  Nov 2, 2020      Influenza Vaccine Declined:  No      2 or More Falls in Past Year?:  No      Fall Past Year with Injury?:  No      Hx Pneumococcal Vaccination:  Yes      Encouraged to follow-up with:  PCP regarding preventative exams.      Chart initiated by      REJI FERRER MA            ALLERGY/MEDS       Allergies      Coded Allergies:             CODEINE (Unverified  Allergy, Severe, HARD TO BREATHE, 1/22/21)           LISINOPRIL (Verified  Allergy, Intermediate, 1/22/21)                  INTERFERES WITH MENTAL ABILITIES            Medications      Last Reconciled on 2/28/21 18:12 by INO CASTILLO      Anastrozole (Anastrozole) 1 Mg Tablet      1 MG PO QDAY, #90 TAB 3 Refills         Prov: INO CASTILLO         1/22/21       Hctz (hydroCHLOROthiazide) 12.5 Mg Capsule      12.5 MG PO QDAY, #30 CAP 0 Refills         Reported         1/22/21       Anastrozole (Anastrozole) 1 Mg Tablet      1 MG PO QDAY, #30 TAB 2 Refills         Prov: INO CASTILLO         12/3/20       Clotrimazole 1% Cream (Clotrimazole 1% Cream) 30 Gm Cream..g.      1 APL TOPICAL QDAY, #30 GM         Reported         12/3/20       Atorvastatin Calcium (Lipitor*) 10 Mg Tablet      20 MG PO QDAY, #60 TAB 0 Refills         Reported         12/3/20       Gabapentin (Gabapentin) 100 Mg Capsule      100 MG PO HS, #30 CAP 0 Refills         Reported         12/3/20       Rivaroxaban (Xarelto) 20 Mg Tablet      20 MG PO QDAY, #30 TAB 5 Refills         Reported         12/3/20       Polyethylene Glycol (Miralax*) 17 Gm Packet      17 GM PO QDAY PRN for CONSTIPATION, #30 PKT 0 Refills         Reported         12/3/20       Olmesartan (Benicar) 40 Mg Tablet      40 MG PO QDAY, #30 TAB         Reported         12/3/20       Metoprolol Succinate (Metoprolol Succinate) 25 Mg Tab.er.24h      25 MG PO BID, #60 TAB 0 Refills         Reported         12/3/20       MDI-Albuterol (Ventolin HFA) 8 Gm Hfa.aer.ad      1 PUFFS INH Q4-6H PRN for SHORTNESS OF BREATH, #1 MDI 0 Refills         Reported         4/12/18       (Tylenol Arthritis)   No Conflict Check      675 MG PO QDAY         Reported         4/12/18       P-Ephed HCl/Fexofenadine HCl 240/180 MG (Allegra-D 24 Hour) 1 Each Tab.er.24h      1 TAB PO QDAY, #30 TAB.SR.24H 0 Refills         Reported          4/12/18       diphenhydrAMINE HCl (BENADRYL) 50 Mg Capsule      25 MG PO HS PRN for CONGESTION, #30 CAP         Reported         4/12/18       busPIRone HCl (Buspar) 15 Mg Tablet      15 MG PO BID, #60 TAB         Reported         7/23/14       Omeprazole (priLOSEC) 20 Mg Capcr      20 MG PO QDAY, #30 CAP 0 Refills         Reported         7/23/14      Medications Reviewed:  Changes made to meds            IMPRESSION/PLAN      Diagnosis      Notes      New Medications      * HCTZ (hydroCHLOROthiazide) 12.5 MG CAPSULE: 12.5 MG PO QDAY #30      * Anastrozole 1 MG TABLET: 1 MG PO QDAY #90            Plan      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  Patient is tolerating anastrozole and will plan to continue for 5    years.  She will continue to get her annual screening mammograms through Dr. Jones.  She will follow up with me in 6 months.            Osteopenia: Found on DEXA scan 12/30/2020. Encouraged her to take vitamin D     supplement daily to discuss calcium supplement with her PCP.            Patient Education      Patient Education Provided:  Yes            Electronically signed by INO CASTILLO  02/28/2021 18:12       Disclaimer: Converted document may not contain table formatting or lab diagrams. Please see [employer] for the authenticated document.

## 2021-05-28 NOTE — PROGRESS NOTES
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  ER/CO positive at 95%, HER-    2 negative (0 by IHC), Ki-67 approximately 5%.      -Consult slide review at [facility] on 2020 showed     essentially identical results.      -2020: Lumpectomy with sentinel lymph node biopsy on the left.  There is a     12 mm area of invasive mucinous carcinoma and a 6 mm area of low-grade DCIS.      All margins were negative.      -[facility] genetic testing done at [facility] was negative per     patient report.            HPI - Oncology Interim      Patient comes in today with her  to establish care.  She underwent     lumpectomy at [facility] by Dr. Jones.  She was found to have     mucinous adenocarcinoma of the left breast with associated DCIS.  She will     continue to follow with Dr. Jones for mammograms but would like to get     endocrine therapy here.  We discussed the risks and benefits to aromatase     inhibitor therapy.  She is agreeable to starting anastrozole.  She reports     having a bone density scan by her gynecologist about 3 years ago which she     remembers as being normal.            She does not have  any significant swelling of her left arm but does feel pain in    her left armpit under her left breast.            Clinical Staging      T1BN0, stage Ia            ECOG Performance Status      1            PAST, FAMILY   Past Medical History      Past Medical History:  Arthritis, High Cholesterol, Hypertension      Other PMH:        AFIB      Hematology/Oncology (F):  Breast Cancer            Past Surgical History      Lumpectomy (LEFT)            Family History      Family History:  Breast Cancer (NIECE), Skin Cancer (MOTHER, SISTER), Thyroid     Cancer (SISTER)            Pancreatic cancer in paternal aunt            Social History      Marital Status:        Lives independently:  Yes      Number of Children:  1      Occupation:  CERTIFIED MEDICAL CODER            Tobacco Use      Tobacco status:  Never smoker      Currently Vaping:  No            Alcohol Use      Alcohol intake:  None            Substance Use      Substance use:  Denies use            REVIEW OF SYSTEMS      General:  Admits: Fatigue;          Denies: Appetite Change, Fever, Night Sweats, Weight Gain, Weight Loss      Eye:  Denies Blurred Vision, Denies Corrective Lenses, Denies Diplopia, Denies     Vision Changes      ENT:  Admits Headache; Denies Hearing Loss, Denies Hoarseness, Denies Sore     Throat      Cardiovascular:  Denies Chest Pain; Admits Palpitations      Other      SWOLLEN ANKLES/LEGS      Respiratory:  Denies: Cough, Coughing Blood, Productive Cough, Shortness of Air,    Wheezing      Gastrointestinal:  Admits Diarrhea; Denies Bloody Stools, Denies Constipation,     Denies Nausea/Vomiting, Denies Problem Swallowing, Denies Unable to Control     Bowels      Genitourinary:  Denies Blood in Urine, Denies Incontinence, Denies Painful     Urination      Musculoskeletal:  Denies Back Pain, Denies Muscle Pain, Denies Painful Joints      Integumentary:  Denies Itching, Denies Lesions, Denies Rash      Neurologic:  Denies Dizziness;  Admits Numbness\Tingling; Denies Seizures      Psychiatric:  Denies Anxiety, Denies Depression      Other      PANIC ATTACKS      Endocrine:  Denies Cold Intolerance, Denies Heat Intolerance      Hematologic/Lymphatic:  Admits Bruising, Admits Bleeding; Denies Enlarged Lymph     Nodes      Reproductive:  Denies: Menopause, Heavy Periods, Pregnant, Still Menstruating            VITAL SIGNS AND SCORES      Vitals      Height 5 ft 3.54 in / 161.4 cm      Weight 264 lbs 15.887 oz / 120.2 kg      BSA 2.19 m2      BMI 46.1 kg/m2      Temperature 97.0 F / 36.11 C - Temporal      Pulse 100      Respirations 18      Blood Pressure 194/92 Sitting, Left Arm      Pulse Oximetry 99%, RM AIR            Pain Score      Experiencing any pain?:  Yes      Pain Scale Used:  Numerical      Pain Intensity:  2            Fatigue Score      Experiencing any fatigue?:  Yes      Fatigue (0-10 scale):  4            PREVENTION      Hx Influenza Vaccination:  Yes      Date Influenza Vaccine Given:  Nov 2, 2020      Influenza Vaccine Declined:  No      2 or More Falls in Past Year?:  No      Fall Past Year with Injury?:  No      Hx Pneumococcal Vaccination:  Yes      Encouraged to follow-up with:  PCP regarding preventative exams.      Chart initiated by      REJI FERRER MA            ALLERGY/MEDS      Allergies      Coded Allergies:             CODEINE (Unverified  Allergy, Severe, HARD TO BREATHE, 12/3/20)           Lisinopril (Verified  Allergy, Intermediate, 12/3/20)                  INTERFERES WITH MENTAL ABILITIES            Medications            Clotrimazole 1% Cream (Clotrimazole 1% Cream) 30 Gm Cream..g.      1 APL TOPICAL QDAY, #30 GM         Reported         12/3/20       Atorvastatin Calcium (Lipitor*) 10 Mg Tablet      20 MG PO QDAY, #60 TAB 0 Refills         Reported         12/3/20       Gabapentin (Gabapentin) 100 Mg Capsule      100 MG PO HS, #30 CAP 0 Refills         Reported         12/3/20       Rivaroxaban (Xarelto) 20 Mg  Tablet      20 MG PO QDAY, #30 TAB 5 Refills         Reported         12/3/20       Polyethylene Glycol (Miralax*) 17 Gm Packet      17 GM PO QDAY PRN for CONSTIPATION, #30 PKT 0 Refills         Reported         12/3/20       Olmesartan (Benicar) 40 Mg Tablet      40 MG PO QDAY, #30 TAB         Reported         12/3/20       Metoprolol Succinate (Metoprolol Succinate) 25 Mg Tab.er.24h      25 MG PO BID, #60 TAB 0 Refills         Reported         12/3/20       MDI-Albuterol (Ventolin HFA) 8 Gm Hfa.aer.ad      1 PUFFS INH Q4-6H PRN for SHORTNESS OF BREATH, #1 MDI 0 Refills         Reported         4/12/18       (Tylenol Arthritis)   No Conflict Check      675 MG PO QDAY         Reported         4/12/18       P-Ephed HCl/Fexofenadine HCl 240/180 MG (Allegra-D 24 Hour) 1 Each Tab.er.24h      1 TAB PO QDAY, #30 TAB.SR.24H 0 Refills         Reported         4/12/18       diphenhydrAMINE HCl (BENADRYL) 50 Mg Capsule      25 MG PO HS PRN for CONGESTION, #30 CAP         Reported         4/12/18       busPIRone HCl (Buspar) 15 Mg Tablet      15 MG PO BID, #60 TAB         Reported         7/23/14       Omeprazole (priLOSEC) 20 Mg Capcr      20 MG PO QDAY, #30 CAP 0 Refills         Reported         7/23/14      Medications Reviewed:  Changes made to meds            IMPRESSION/PLAN      Diagnosis      Post-menopausal - Z78.0            Notes      New Medications      * Metoprolol Succinate 25 MG TAB.ER.24H: 25 MG PO BID #60      * Olmesartan (Benicar) 40 MG TABLET: 40 MG PO QDAY #30      * POLYETHYLENE GLYCOL (Miralax*) 17 GM PACKET: 17 GM PO QDAY PRN CONSTIPATION       #30      * Rivaroxaban (Xarelto) 20 MG TABLET: 20 MG PO QDAY #30      * Gabapentin 100 MG CAPSULE: 100 MG PO HS #30      * Atorvastatin Calcium (Lipitor*) 10 MG TABLET: 20 MG PO QDAY #60      * Clotrimazole 1% Cream 30 GM CREAM..G.: 1 APL TOPICAL QDAY #30      * Anastrozole 1 MG TABLET: 1 MG PO QDAY #30         Dx: Post-menopausal - Z78.0      Changed Medications       * diphenhydrAMINE HCl (BENADRYL) 50 MG CAPSULE: 25 MG PO HS PRN CONGESTION #30      Discontinued Medications      * Aspirin Chew 81 MG TAB.CHEW: 81 MG PO QDAY@08 30 Days #30      New Diagnostics      * Bone Densitometry DEXA, SCHEDULED PROCEDURE         Dx: Post-menopausal - Z78.0            Plan      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  She was found to have very    early stage and low-grade disease.  I explained to her that mucinous     adenocarcinoma is a very indolent type of breast cancer.  There is low     likelihood of future metastasis.  Regardless, even with a DCIS diagnosis she     should get 5 years of antihormone therapy.  We discussed the risks and benefits     to anastrozole which the patient is agreeable to.  She will start this     medication in the coming days.  I will also schedule her for a bone density test    and follow-up with her after Christmas.  She has already met with Dr. Goldman in     radiation oncology and undergone simulation.  She will start radiation in the     coming week.  She has follow-up with Dr. Jones in June for her annual     screening mammogram.            Patient Education      Patient Education Provided:  Yes            Electronically signed by INO CASTILLO  12/03/2020 17:16       Disclaimer: Converted document may not contain table formatting or lab diagrams. Please see [employer] for the authenticated document.

## 2021-07-06 ENCOUNTER — TELEPHONE (OUTPATIENT)
Dept: INTERNAL MEDICINE | Facility: CLINIC | Age: 71
End: 2021-07-06

## 2021-07-06 NOTE — TELEPHONE ENCOUNTER
Can she cut the Benicar in 1/2?  If so, do that and monitor.  If there is a problem, send her in 20 mg tablets. Keep her appt in a few weeks.

## 2021-07-08 ENCOUNTER — TELEPHONE (OUTPATIENT)
Dept: INTERNAL MEDICINE | Facility: CLINIC | Age: 71
End: 2021-07-08

## 2021-07-08 NOTE — TELEPHONE ENCOUNTER
Lets hold the course through the weekend and see how she does.  Keep me posted.  I am on call if she needs anything.

## 2021-07-08 NOTE — TELEPHONE ENCOUNTER
Caller: Helena Romero    Relationship to patient: Self    Best call back number:[phone]    Patient is needing: PATIENT CALLED IN AND SAID HER BLOOD PRESSURE /66 NOW. SHE SAID SHE IS STARTING TO FEEL BETTER BUT STILL SUPER TIRED.

## 2021-07-12 ENCOUNTER — TELEPHONE (OUTPATIENT)
Dept: INTERNAL MEDICINE | Facility: CLINIC | Age: 71
End: 2021-07-12

## 2021-07-12 RX ORDER — FLUCONAZOLE 150 MG/1
150 TABLET ORAL ONCE
Qty: 1 TABLET | Refills: 0 | Status: SHIPPED | OUTPATIENT
Start: 2021-07-12 | End: 2021-07-12

## 2021-07-12 NOTE — TELEPHONE ENCOUNTER
Patient states she received a call back from Ally and was returning the call related to her blood pressure, please call [phone].

## 2021-07-12 NOTE — TELEPHONE ENCOUNTER
Spoke with PT, her Blood pressure is doing well she will us know if anything changes.      PT would like to know if you will call something in for vaginal yeast infection creams are not helping

## 2021-07-19 RX ORDER — ATORVASTATIN CALCIUM 20 MG/1
20 TABLET, FILM COATED ORAL DAILY
Qty: 90 TABLET | Refills: 1 | Status: SHIPPED | OUTPATIENT
Start: 2021-07-19 | End: 2022-02-03

## 2021-07-22 ENCOUNTER — OFFICE VISIT (OUTPATIENT)
Dept: ONCOLOGY | Facility: HOSPITAL | Age: 71
End: 2021-07-22

## 2021-07-22 VITALS
HEART RATE: 65 BPM | RESPIRATION RATE: 20 BRPM | BODY MASS INDEX: 43.88 KG/M2 | DIASTOLIC BLOOD PRESSURE: 56 MMHG | WEIGHT: 263.67 LBS | OXYGEN SATURATION: 97 % | TEMPERATURE: 96.5 F | SYSTOLIC BLOOD PRESSURE: 124 MMHG

## 2021-07-22 DIAGNOSIS — Z17.0 MALIGNANT NEOPLASM OF LOWER-OUTER QUADRANT OF LEFT BREAST OF FEMALE, ESTROGEN RECEPTOR POSITIVE (HCC): Primary | ICD-10-CM

## 2021-07-22 DIAGNOSIS — R60.0 LOWER EXTREMITY EDEMA: ICD-10-CM

## 2021-07-22 DIAGNOSIS — C50.512 MALIGNANT NEOPLASM OF LOWER-OUTER QUADRANT OF LEFT BREAST OF FEMALE, ESTROGEN RECEPTOR POSITIVE (HCC): Primary | ICD-10-CM

## 2021-07-22 PROBLEM — R87.619 ABNORMAL PAP SMEAR: Status: ACTIVE | Noted: 2021-07-22

## 2021-07-22 PROBLEM — IMO0002 ABNORMAL PAP SMEAR: Status: ACTIVE | Noted: 2021-07-22

## 2021-07-22 PROCEDURE — G0463 HOSPITAL OUTPT CLINIC VISIT: HCPCS | Performed by: INTERNAL MEDICINE

## 2021-07-22 PROCEDURE — 99204 OFFICE O/P NEW MOD 45 MIN: CPT | Performed by: INTERNAL MEDICINE

## 2021-07-22 RX ORDER — FLUCONAZOLE 150 MG/1
150 TABLET ORAL ONCE
COMMUNITY
Start: 2021-07-12 | End: 2021-12-14

## 2021-07-29 ENCOUNTER — TELEPHONE (OUTPATIENT)
Dept: SURGERY | Facility: CLINIC | Age: 71
End: 2021-07-29

## 2021-07-29 NOTE — TELEPHONE ENCOUNTER
[facility] July 28, 2021.  Bilateral screening mammogram with tomosynthesis.  Postlumpectomy changes in the left.  Post radiation changes.  The breasts are almost entirely fatty.  No suspicious findings BI-RADS 2

## 2021-08-02 RX ORDER — OLMESARTAN MEDOXOMIL 40 MG/1
40 TABLET ORAL DAILY
Qty: 90 TABLET | Refills: 1 | Status: SHIPPED | OUTPATIENT
Start: 2021-08-02 | End: 2022-01-31

## 2021-08-04 PROBLEM — R60.0 LOWER EXTREMITY EDEMA: Status: ACTIVE | Noted: 2021-08-04

## 2021-08-10 ENCOUNTER — TELEPHONE (OUTPATIENT)
Dept: ONCOLOGY | Facility: HOSPITAL | Age: 71
End: 2021-08-10

## 2021-08-10 DIAGNOSIS — Z17.0 MALIGNANT NEOPLASM OF LOWER-OUTER QUADRANT OF LEFT BREAST OF FEMALE, ESTROGEN RECEPTOR POSITIVE: Primary | ICD-10-CM

## 2021-08-10 DIAGNOSIS — C50.512 MALIGNANT NEOPLASM OF LOWER-OUTER QUADRANT OF LEFT BREAST OF FEMALE, ESTROGEN RECEPTOR POSITIVE: Primary | ICD-10-CM

## 2021-08-10 NOTE — TELEPHONE ENCOUNTER
Caller: Helena Romero    Relationship to patient: Self    Best call back number: [phone]    Type of visit: FOLLOW UP    Requested date: ASA, AS LATE IN DAY AS POSSIBLE.    If rescheduling, when is the original appointment: 08/19    Additional notes: PLEASE CALL ONCE R/S. HUB UNABLE TO R/S WITHIN TIMEFRAME.

## 2021-08-13 RX ORDER — EXEMESTANE 25 MG/1
25 TABLET ORAL DAILY
Qty: 30 TABLET | Refills: 2 | Status: SHIPPED | OUTPATIENT
Start: 2021-08-13 | End: 2021-10-04 | Stop reason: SDUPTHER

## 2021-08-23 ENCOUNTER — OFFICE VISIT (OUTPATIENT)
Dept: INTERNAL MEDICINE | Facility: CLINIC | Age: 71
End: 2021-08-23

## 2021-08-23 VITALS
DIASTOLIC BLOOD PRESSURE: 70 MMHG | WEIGHT: 265 LBS | SYSTOLIC BLOOD PRESSURE: 124 MMHG | HEIGHT: 65 IN | TEMPERATURE: 97.3 F | BODY MASS INDEX: 44.15 KG/M2 | HEART RATE: 74 BPM

## 2021-08-23 DIAGNOSIS — I10 ESSENTIAL HYPERTENSION: ICD-10-CM

## 2021-08-23 DIAGNOSIS — Z12.11 SCREEN FOR COLON CANCER: Primary | ICD-10-CM

## 2021-08-23 DIAGNOSIS — Z11.59 ENCOUNTER FOR HEPATITIS C SCREENING TEST FOR LOW RISK PATIENT: ICD-10-CM

## 2021-08-23 DIAGNOSIS — I48.0 PAROXYSMAL ATRIAL FIBRILLATION (HCC): ICD-10-CM

## 2021-08-23 PROCEDURE — 99213 OFFICE O/P EST LOW 20 MIN: CPT | Performed by: INTERNAL MEDICINE

## 2021-08-23 NOTE — PROGRESS NOTES
"Chief Complaint  Hypertension    Subjective          Helena Romero presents to [facility] PRIMARY CARE  History of Present Illness  Here for reg f/u- she has been watching BP and it seems lower.  She is not having any new issues.,  Is still working but wants to get off some of her meds before she does that- although hasn't lost weight, exercised, etc.   Now on Armasin for her breast cancer- had leg swelling from tamoxifen.    Doing fine on Xarelto.   She has started Dr. Dhillon bone broth and is drinking for a meal replacement.    Taking gabapentin 300 mg at HS- is going to try to decrease to 200 mg due to dry mouth.   She has significant hip pain lying on either side. She has trouble getting comfortable sleeping.      Objective   Vital Signs:   /70   Pulse 74   Temp 97.3 °F (36.3 °C)   Ht 165.1 cm (65\")   Wt 120 kg (265 lb)   BMI 44.10 kg/m²     Physical Exam  Constitutional:       Appearance: Normal appearance. She is obese.   Cardiovascular:      Rate and Rhythm: Normal rate. Rhythm irregular.   Musculoskeletal:         General: Tenderness: over B trochanteric bursa.      Right lower leg: No edema.      Left lower leg: No edema.   Neurological:      General: No focal deficit present.      Gait: Gait normal.        Result Review :                 Assessment and Plan    Diagnoses and all orders for this visit:    1. Screen for colon cancer (Primary)  Comments:  agrees to Cologard - no family history, knows she needs c-scope if she gets positive result.  Orders:  -     Cologuard - Stool, Per Rectum; Future    2. Essential hypertension  Comments:  Controlled, no change  Orders:  -     Comprehensive Metabolic Panel  -     Lipid Panel With / Chol / HDL Ratio    3. Paroxysmal atrial fibrillation (CMS/HCC)  Comments:  tolerates well- anticoagulated, on beta blocker.    4. Encounter for hepatitis C screening test for low risk patient  -     Hepatitis C Antibody        Follow Up   Return in " about 6 months (around 2/23/2022) for Medicare Wellness.  Patient was given instructions and counseling regarding her condition or for health maintenance advice. Please see specific information pulled into the AVS if appropriate.

## 2021-09-02 RX ORDER — OMEPRAZOLE 20 MG/1
20 CAPSULE, DELAYED RELEASE ORAL DAILY
Qty: 90 CAPSULE | Refills: 1 | Status: SHIPPED | OUTPATIENT
Start: 2021-09-02 | End: 2022-05-31

## 2021-09-20 ENCOUNTER — APPOINTMENT (OUTPATIENT)
Dept: ONCOLOGY | Facility: HOSPITAL | Age: 71
End: 2021-09-20

## 2021-09-20 DIAGNOSIS — I10 ESSENTIAL HYPERTENSION: ICD-10-CM

## 2021-09-20 RX ORDER — HYDROCHLOROTHIAZIDE 12.5 MG/1
12.5 TABLET ORAL DAILY
Qty: 90 TABLET | Refills: 2 | Status: SHIPPED | OUTPATIENT
Start: 2021-09-20 | End: 2022-07-18

## 2021-10-04 ENCOUNTER — OFFICE VISIT (OUTPATIENT)
Dept: ONCOLOGY | Facility: HOSPITAL | Age: 71
End: 2021-10-04

## 2021-10-04 VITALS
SYSTOLIC BLOOD PRESSURE: 132 MMHG | OXYGEN SATURATION: 95 % | WEIGHT: 266 LBS | HEART RATE: 65 BPM | BODY MASS INDEX: 44.26 KG/M2 | TEMPERATURE: 98.4 F | DIASTOLIC BLOOD PRESSURE: 98 MMHG | RESPIRATION RATE: 18 BRPM

## 2021-10-04 DIAGNOSIS — C50.512 MALIGNANT NEOPLASM OF LOWER-OUTER QUADRANT OF LEFT BREAST OF FEMALE, ESTROGEN RECEPTOR POSITIVE (HCC): ICD-10-CM

## 2021-10-04 DIAGNOSIS — Z17.0 MALIGNANT NEOPLASM OF LOWER-OUTER QUADRANT OF LEFT BREAST OF FEMALE, ESTROGEN RECEPTOR POSITIVE (HCC): ICD-10-CM

## 2021-10-04 PROCEDURE — G0463 HOSPITAL OUTPT CLINIC VISIT: HCPCS | Performed by: INTERNAL MEDICINE

## 2021-10-04 PROCEDURE — 99213 OFFICE O/P EST LOW 20 MIN: CPT | Performed by: INTERNAL MEDICINE

## 2021-10-04 RX ORDER — EXEMESTANE 25 MG/1
25 TABLET ORAL DAILY
Qty: 90 TABLET | Refills: 1 | Status: SHIPPED | OUTPATIENT
Start: 2021-10-04 | End: 2021-12-03

## 2021-10-19 DIAGNOSIS — M19.91 PRIMARY OSTEOARTHRITIS, UNSPECIFIED SITE: ICD-10-CM

## 2021-10-19 RX ORDER — GABAPENTIN 100 MG/1
300 CAPSULE ORAL NIGHTLY
Qty: 90 CAPSULE | Refills: 1 | Status: SHIPPED | OUTPATIENT
Start: 2021-10-19 | End: 2021-12-27

## 2021-10-20 ENCOUNTER — APPOINTMENT (OUTPATIENT)
Dept: CT IMAGING | Facility: HOSPITAL | Age: 71
End: 2021-10-20

## 2021-10-20 ENCOUNTER — HOSPITAL ENCOUNTER (EMERGENCY)
Facility: HOSPITAL | Age: 71
Discharge: HOME | End: 2021-10-20
Attending: EMERGENCY MEDICINE | Admitting: EMERGENCY MEDICINE

## 2021-10-20 VITALS
RESPIRATION RATE: 18 BRPM | HEIGHT: 64 IN | TEMPERATURE: 97.7 F | SYSTOLIC BLOOD PRESSURE: 133 MMHG | HEART RATE: 66 BPM | OXYGEN SATURATION: 99 % | WEIGHT: 262.13 LBS | BODY MASS INDEX: 44.75 KG/M2 | DIASTOLIC BLOOD PRESSURE: 75 MMHG

## 2021-10-20 DIAGNOSIS — R19.7 DIARRHEA, UNSPECIFIED TYPE: Primary | ICD-10-CM

## 2021-10-20 DIAGNOSIS — R10.30 LOWER ABDOMINAL PAIN: ICD-10-CM

## 2021-10-20 DIAGNOSIS — N30.00 ACUTE CYSTITIS WITHOUT HEMATURIA: ICD-10-CM

## 2021-10-20 LAB
ALBUMIN SERPL-MCNC: 4.7 G/DL (ref 3.5–5.2)
ALBUMIN/GLOB SERPL: 1.6 G/DL
ALP SERPL-CCNC: 107 U/L (ref 39–117)
ALT SERPL W P-5'-P-CCNC: 19 U/L (ref 1–33)
ANION GAP SERPL CALCULATED.3IONS-SCNC: 11.2 MMOL/L (ref 5–15)
AST SERPL-CCNC: 21 U/L (ref 1–32)
BACTERIA UR QL AUTO: ABNORMAL /HPF
BASOPHILS # BLD AUTO: 0.04 10*3/MM3 (ref 0–0.2)
BASOPHILS NFR BLD AUTO: 0.5 % (ref 0–1.5)
BILIRUB SERPL-MCNC: 0.4 MG/DL (ref 0–1.2)
BILIRUB UR QL STRIP: NEGATIVE
BUN SERPL-MCNC: 9 MG/DL (ref 8–23)
BUN/CREAT SERPL: 8.1 (ref 7–25)
CALCIUM SPEC-SCNC: 10.2 MG/DL (ref 8.6–10.5)
CHLORIDE SERPL-SCNC: 100 MMOL/L (ref 98–107)
CLARITY UR: CLEAR
CO2 SERPL-SCNC: 24.8 MMOL/L (ref 22–29)
COLOR UR: YELLOW
CREAT SERPL-MCNC: 1.11 MG/DL (ref 0.57–1)
D-LACTATE SERPL-SCNC: 1.2 MMOL/L (ref 0.5–2)
DEPRECATED RDW RBC AUTO: 42.8 FL (ref 37–54)
EOSINOPHIL # BLD AUTO: 0.19 10*3/MM3 (ref 0–0.4)
EOSINOPHIL NFR BLD AUTO: 2.2 % (ref 0.3–6.2)
ERYTHROCYTE [DISTWIDTH] IN BLOOD BY AUTOMATED COUNT: 13 % (ref 12.3–15.4)
GFR SERPL CREATININE-BSD FRML MDRD: 48 ML/MIN/1.73
GLOBULIN UR ELPH-MCNC: 2.9 GM/DL
GLUCOSE SERPL-MCNC: 114 MG/DL (ref 65–99)
GLUCOSE UR STRIP-MCNC: NEGATIVE MG/DL
HCT VFR BLD AUTO: 39.1 % (ref 34–46.6)
HGB BLD-MCNC: 12.8 G/DL (ref 12–15.9)
HGB UR QL STRIP.AUTO: NEGATIVE
HOLD SPECIMEN: NORMAL
HOLD SPECIMEN: NORMAL
HYALINE CASTS UR QL AUTO: ABNORMAL /LPF
IMM GRANULOCYTES # BLD AUTO: 0.03 10*3/MM3 (ref 0–0.05)
IMM GRANULOCYTES NFR BLD AUTO: 0.3 % (ref 0–0.5)
KETONES UR QL STRIP: NEGATIVE
LEUKOCYTE ESTERASE UR QL STRIP.AUTO: ABNORMAL
LIPASE SERPL-CCNC: 31 U/L (ref 13–60)
LYMPHOCYTES # BLD AUTO: 1.22 10*3/MM3 (ref 0.7–3.1)
LYMPHOCYTES NFR BLD AUTO: 14.1 % (ref 19.6–45.3)
MCH RBC QN AUTO: 29.4 PG (ref 26.6–33)
MCHC RBC AUTO-ENTMCNC: 32.7 G/DL (ref 31.5–35.7)
MCV RBC AUTO: 89.7 FL (ref 79–97)
MONOCYTES # BLD AUTO: 0.61 10*3/MM3 (ref 0.1–0.9)
MONOCYTES NFR BLD AUTO: 7.1 % (ref 5–12)
NEUTROPHILS NFR BLD AUTO: 6.54 10*3/MM3 (ref 1.7–7)
NEUTROPHILS NFR BLD AUTO: 75.8 % (ref 42.7–76)
NITRITE UR QL STRIP: NEGATIVE
NRBC BLD AUTO-RTO: 0 /100 WBC (ref 0–0.2)
PH UR STRIP.AUTO: 5.5 [PH] (ref 5–8)
PLATELET # BLD AUTO: 238 10*3/MM3 (ref 140–450)
PMV BLD AUTO: 10.4 FL (ref 6–12)
POTASSIUM SERPL-SCNC: 3.6 MMOL/L (ref 3.5–5.2)
PROT SERPL-MCNC: 7.6 G/DL (ref 6–8.5)
PROT UR QL STRIP: NEGATIVE
RBC # BLD AUTO: 4.36 10*6/MM3 (ref 3.77–5.28)
RBC # UR: ABNORMAL /HPF
REF LAB TEST METHOD: ABNORMAL
SODIUM SERPL-SCNC: 136 MMOL/L (ref 136–145)
SP GR UR STRIP: >=1.03 (ref 1–1.03)
SQUAMOUS #/AREA URNS HPF: ABNORMAL /HPF
UROBILINOGEN UR QL STRIP: ABNORMAL
WBC # BLD AUTO: 8.63 10*3/MM3 (ref 3.4–10.8)
WBC UR QL AUTO: ABNORMAL /HPF
WHOLE BLOOD HOLD SPECIMEN: NORMAL
WHOLE BLOOD HOLD SPECIMEN: NORMAL

## 2021-10-20 PROCEDURE — 25010000002 CEFTRIAXONE PER 250 MG: Performed by: NURSE PRACTITIONER

## 2021-10-20 PROCEDURE — 74177 CT ABD & PELVIS W/CONTRAST: CPT

## 2021-10-20 PROCEDURE — 83690 ASSAY OF LIPASE: CPT | Performed by: EMERGENCY MEDICINE

## 2021-10-20 PROCEDURE — 81001 URINALYSIS AUTO W/SCOPE: CPT | Performed by: EMERGENCY MEDICINE

## 2021-10-20 PROCEDURE — 96375 TX/PRO/DX INJ NEW DRUG ADDON: CPT

## 2021-10-20 PROCEDURE — 99283 EMERGENCY DEPT VISIT LOW MDM: CPT

## 2021-10-20 PROCEDURE — 87086 URINE CULTURE/COLONY COUNT: CPT | Performed by: NURSE PRACTITIONER

## 2021-10-20 PROCEDURE — 96372 THER/PROPH/DIAG INJ SC/IM: CPT

## 2021-10-20 PROCEDURE — 0 IOPAMIDOL PER 1 ML: Performed by: EMERGENCY MEDICINE

## 2021-10-20 PROCEDURE — 96365 THER/PROPH/DIAG IV INF INIT: CPT

## 2021-10-20 PROCEDURE — 25010000002 DICYCLOMINE PER 20 MG: Performed by: NURSE PRACTITIONER

## 2021-10-20 PROCEDURE — 25010000002 ONDANSETRON PER 1 MG: Performed by: NURSE PRACTITIONER

## 2021-10-20 PROCEDURE — 85025 COMPLETE CBC W/AUTO DIFF WBC: CPT | Performed by: EMERGENCY MEDICINE

## 2021-10-20 PROCEDURE — 80053 COMPREHEN METABOLIC PANEL: CPT | Performed by: EMERGENCY MEDICINE

## 2021-10-20 PROCEDURE — 83605 ASSAY OF LACTIC ACID: CPT | Performed by: NURSE PRACTITIONER

## 2021-10-20 RX ORDER — ONDANSETRON 2 MG/ML
4 INJECTION INTRAMUSCULAR; INTRAVENOUS ONCE
Status: COMPLETED | OUTPATIENT
Start: 2021-10-20 | End: 2021-10-20

## 2021-10-20 RX ORDER — CEPHALEXIN 500 MG/1
500 CAPSULE ORAL 2 TIMES DAILY
Qty: 14 CAPSULE | Refills: 0 | Status: SHIPPED | OUTPATIENT
Start: 2021-10-20 | End: 2021-12-14

## 2021-10-20 RX ORDER — DICYCLOMINE HCL 20 MG
20 TABLET ORAL EVERY 6 HOURS
Qty: 20 TABLET | Refills: 0 | Status: SHIPPED | OUTPATIENT
Start: 2021-10-20

## 2021-10-20 RX ORDER — ONDANSETRON 4 MG/1
4 TABLET, ORALLY DISINTEGRATING ORAL 4 TIMES DAILY PRN
Qty: 15 TABLET | Refills: 0 | Status: SHIPPED | OUTPATIENT
Start: 2021-10-20

## 2021-10-20 RX ORDER — SODIUM CHLORIDE 0.9 % (FLUSH) 0.9 %
10 SYRINGE (ML) INJECTION AS NEEDED
Status: DISCONTINUED | OUTPATIENT
Start: 2021-10-20 | End: 2021-10-20 | Stop reason: HOSPADM

## 2021-10-20 RX ORDER — CEFTRIAXONE SODIUM 1 G/50ML
1 INJECTION, SOLUTION INTRAVENOUS ONCE
Status: COMPLETED | OUTPATIENT
Start: 2021-10-20 | End: 2021-10-20

## 2021-10-20 RX ORDER — DICYCLOMINE HYDROCHLORIDE 10 MG/ML
20 INJECTION INTRAMUSCULAR ONCE
Status: COMPLETED | OUTPATIENT
Start: 2021-10-20 | End: 2021-10-20

## 2021-10-20 RX ADMIN — DICYCLOMINE HYDROCHLORIDE 20 MG: 20 INJECTION, SOLUTION INTRAMUSCULAR at 04:11

## 2021-10-20 RX ADMIN — ONDANSETRON 4 MG: 2 INJECTION INTRAMUSCULAR; INTRAVENOUS at 04:09

## 2021-10-20 RX ADMIN — SODIUM CHLORIDE 500 ML: 9 INJECTION, SOLUTION INTRAVENOUS at 04:09

## 2021-10-20 RX ADMIN — CEFTRIAXONE SODIUM 1 G: 1 INJECTION, SOLUTION INTRAVENOUS at 04:40

## 2021-10-20 RX ADMIN — IOPAMIDOL 100 ML: 755 INJECTION, SOLUTION INTRAVENOUS at 05:25

## 2021-10-20 NOTE — ED TRIAGE NOTES
"Patient came into the ED today for lower abdominal pain. Patient states, \"At about 12 I got this really bad pain in my stomach. I took miralax around 1am and it didn't help. I have a hx of a blocked bowel so I wanted to get it checked out. I've had diarrhea all day today and a few times in the past couple of days.\"  "

## 2021-10-20 NOTE — ED PROVIDER NOTES
"Subjective   The patient presents to the emergency department and states she had a sudden onset of abdominal pain that she describes as \"waves of pain\".  She states this started about 1 AM and is lasted constantly for 2 hours.  She states that she has been excessively gassy with belching and flatulence since then.  She does report some tenderness with palpation in her lower left abdomen.  She denies any nausea or vomiting.  She does state that she had to call into work yesterday because she was having diarrhea all day.  She denies any blood or mucus in her stools.  She does have a history of a bowel obstruction in 2013 but states that it resolved without surgical intervention at that time.  She denies any recent fevers.          Review of Systems   Constitutional: Negative for chills and fever.   HENT: Negative for congestion, ear pain and sore throat.    Eyes: Negative for pain.   Respiratory: Negative for cough, chest tightness and shortness of breath.    Cardiovascular: Negative for chest pain.   Gastrointestinal: Positive for abdominal pain and diarrhea. Negative for nausea and vomiting.   Genitourinary: Negative for flank pain and hematuria.   Musculoskeletal: Negative for joint swelling.   Skin: Negative for pallor.   Neurological: Negative for seizures and headaches.   All other systems reviewed and are negative.      Past Medical History:   Diagnosis Date   • Acute bronchitis    • Allergic    • Anesthesia complication     PATIENT STAYS SLEEPY A LONGTIME AFTER SURGERY   • Anxiety    • Asthma    • Atrial fibrillation (HCC)    • Borderline hyperglycemia    • Breast cancer (HCC)    • Cat bite    • Depression    • Diarrhea    • GERD (gastroesophageal reflux disease)    • Hypertension    • Hypocalcemia    • Insomnia    • Low back pain    • Low back pain, episodic    • Need for diphtheria-tetanus-pertussis (Tdap) vaccine    • Neuromuscular disorder (HCC)    • Obesity    • Osteoarthritis, generalized    • Panic " attack    • Pneumonia     x 3   • Seasonal allergies    • Sleep disturbances    • Trochanteric bursitis    • Vaginal candidiasis    • Yeast infection        Allergies   Allergen Reactions   • Codeine Nausea Only   • Lisinopril Hives       Past Surgical History:   Procedure Laterality Date   • BREAST BIOPSY Left 2020    MALIGNANT   • BREAST LUMPECTOMY WITH SENTINEL NODE BIOPSY Left 11/5/2020    Procedure: Left breast charley localized lumpectomy and left axillary sentinel lymph node biopsy;  Surgeon: Inés Jones MD;  Location: [facility] OR;  Service: General;  Laterality: Left;   • CERVICAL CONIZATION, LEEP     • CHOLECYSTECTOMY     • COLONOSCOPY  approx 2011    normal per patient-repeat 10 years   • DILATION AND CURETTAGE, DIAGNOSTIC / THERAPEUTIC     • TONSILLECTOMY     • TUBAL ABDOMINAL LIGATION         Family History   Problem Relation Age of Onset   • Pancreatic cancer Paternal Aunt    • Cervical cancer Mother 41   • Melanoma Sister 60   • Thyroid cancer Sister 60   • Breast cancer Niece 38        TRIPLE -   • Malig Hyperthermia Neg Hx        Social History     Socioeconomic History   • Marital status:    Tobacco Use   • Smoking status: Never Smoker   • Smokeless tobacco: Never Used   Vaping Use   • Vaping Use: Never used   Substance and Sexual Activity   • Alcohol use: No   • Drug use: No   • Sexual activity: Defer           Objective   Physical Exam  Vitals and nursing note reviewed.   Constitutional:       General: She is not in acute distress.     Appearance: Normal appearance. She is not toxic-appearing.   HENT:      Head: Normocephalic and atraumatic.      Mouth/Throat:      Mouth: Mucous membranes are moist.   Eyes:      General: No scleral icterus.  Cardiovascular:      Rate and Rhythm: Normal rate and regular rhythm.      Pulses: Normal pulses.   Pulmonary:      Effort: Pulmonary effort is normal. No respiratory distress.      Breath sounds: Normal breath sounds.   Abdominal:       General: Abdomen is flat.      Palpations: Abdomen is soft.      Tenderness: There is no abdominal tenderness.   Musculoskeletal:         General: Normal range of motion.      Cervical back: Normal range of motion and neck supple.   Skin:     General: Skin is warm and dry.      Capillary Refill: Capillary refill takes less than 2 seconds.   Neurological:      General: No focal deficit present.      Mental Status: She is alert and oriented to person, place, and time. Mental status is at baseline.         Procedures           ED Course  ED Course as of 10/20/21 0611   Wed Oct 20, 2021   0558 The patient reports that she is feeling much better since her medications. She states that she feels well enough to go home. [TC]      ED Course User Index  [TC] Dotty Chavez APRN                                           MDM  Number of Diagnoses or Management Options  Acute cystitis without hematuria: minor  Diarrhea, unspecified type: minor  Lower abdominal pain: minor     Amount and/or Complexity of Data Reviewed  Clinical lab tests: reviewed  Tests in the radiology section of CPT®: reviewed    Risk of Complications, Morbidity, and/or Mortality  Presenting problems: low  Diagnostic procedures: low  Management options: low    Patient Progress  Patient progress: stable      Final diagnoses:   Diarrhea, unspecified type   Acute cystitis without hematuria   Lower abdominal pain       ED Disposition  ED Disposition     ED Disposition Condition Comment    Discharge Stable           Ally Lagos MD  [facility] [place] [address]  [phone]    In 2 days  FOR FOLLOW UP         Medication List      New Prescriptions    cephalexin 500 MG capsule  Commonly known as: KEFLEX  Take 1 capsule by mouth 2 (Two) Times a Day.     dicyclomine 20 MG tablet  Commonly known as: BENTYL  Take 1 tablet by mouth Every 6 (Six) Hours.     ondansetron ODT 4 MG disintegrating tablet  Commonly known as: ZOFRAN-ODT  Place 1 tablet on  the tongue 4 (Four) Times a Day As Needed for Nausea or Vomiting.           Where to Get Your Medications      These medications were sent to [id number] - [place], [place] - [address] AT [facility] [address] - [phone]  - [phone]   [address] , [place] [address]    Phone: [phone]   · cephalexin 500 MG capsule  · dicyclomine 20 MG tablet  · ondansetron ODT 4 MG disintegrating tablet          Dotty Chavez APRN  10/20/21 0695

## 2021-10-20 NOTE — DISCHARGE INSTRUCTIONS
Rest, drink plenty of fluids. Clear liquid diet today and advance as tolerated to a bland diet until symptoms improve. Take your meds as prescribed. You may also take over-the-counter acetaminophen or Motrin as needed for pain or fever. Increase your daily dietary yogurts or you may take over-the-counter probiotics to help with your diarrhea symptoms. Follow-up with JOHN Lin in 2 days for further evaluation and treatment. Return to the emergency department for any acutely worsening abdominal pain, any persistent vomiting, or any new or worse concerns.

## 2021-10-21 LAB — BACTERIA SPEC AEROBE CULT: NORMAL

## 2021-11-01 ENCOUNTER — FLU SHOT (OUTPATIENT)
Dept: INTERNAL MEDICINE | Facility: CLINIC | Age: 71
End: 2021-11-01

## 2021-11-01 ENCOUNTER — OFFICE VISIT (OUTPATIENT)
Dept: SURGERY | Facility: CLINIC | Age: 71
End: 2021-11-01

## 2021-11-01 VITALS
BODY MASS INDEX: 44.65 KG/M2 | TEMPERATURE: 97.1 F | HEIGHT: 65 IN | OXYGEN SATURATION: 95 % | HEART RATE: 74 BPM | SYSTOLIC BLOOD PRESSURE: 138 MMHG | DIASTOLIC BLOOD PRESSURE: 72 MMHG | WEIGHT: 268 LBS

## 2021-11-01 DIAGNOSIS — Z80.8 FH: THYROID CANCER: ICD-10-CM

## 2021-11-01 DIAGNOSIS — C50.512 CARCINOMA OF LOWER-OUTER QUADRANT OF LEFT FEMALE BREAST, UNSPECIFIED ESTROGEN RECEPTOR STATUS (HCC): Primary | ICD-10-CM

## 2021-11-01 DIAGNOSIS — Z80.0 FH: PANCREATIC CANCER: ICD-10-CM

## 2021-11-01 DIAGNOSIS — E66.01 MORBID (SEVERE) OBESITY DUE TO EXCESS CALORIES (HCC): ICD-10-CM

## 2021-11-01 DIAGNOSIS — I48.91 ATRIAL FIBRILLATION, UNSPECIFIED TYPE (HCC): ICD-10-CM

## 2021-11-01 DIAGNOSIS — Z23 NEED FOR INFLUENZA VACCINATION: Primary | ICD-10-CM

## 2021-11-01 DIAGNOSIS — Z80.8 FAMILY HISTORY OF MELANOMA: ICD-10-CM

## 2021-11-01 DIAGNOSIS — Z80.3 FH: BREAST CANCER: ICD-10-CM

## 2021-11-01 PROCEDURE — 99213 OFFICE O/P EST LOW 20 MIN: CPT | Performed by: SURGERY

## 2021-11-01 PROCEDURE — 90662 IIV NO PRSV INCREASED AG IM: CPT | Performed by: INTERNAL MEDICINE

## 2021-11-01 PROCEDURE — G0008 ADMIN INFLUENZA VIRUS VAC: HCPCS | Performed by: INTERNAL MEDICINE

## 2021-11-01 NOTE — PROGRESS NOTES
Chief Complaint: Helena Romero is a 71 y.o. female who was seen in consultation at the request of Ally Lagos MD  for newly diagnosed breast cancer and a followup visit    History of Present Illness:  Patient presents with newly diagnosed breast cancer. She noted no new masses, skin changes, nipple discharge, nipple changes prior to her most recent imaging.  Her most recent imaging includes the followin2018  [facility]    Mammo  Helena Romero  BILATERAL SCREENING MAMMO WITH ROBI  Scattered areas of fibroglandular density.  BIRADS: 1, Negative.    2020  [facility]    Mammo  Helena Nick  MAMMO ROBI SCREENING BILATERAL  Breasts are almost entirely fatty.  Focal asymmetry in the left breast at 6:00 at posterior depth.  BIRADS: 0    08/10/2020  [facility]    Radiology  Helena Romero  LEFT DIAGNOSTIC MAMMO WITH ROBI  LEFT BREAST US  Family history of breast cancer: niece, at age 38.  9 mm high density mass in the posterior one third lower central left breast. This appears to have increased in size and density when compared to prior images.  Left breast US: No sonographic correlate to the mammographic finding.  BIRADS: 4    She had a biopsy on the following day that showed:   2020  [facility]    Pathology  Helena Romero  MAMMO STEREO BX LEFT BREAST  9 gauge biopsy needle 8 core specimens.  A metallic ribbon shaped biopsy tissue marker was placed.  A left mammogram, obtained immediately post core biopsy, documented sampling of the targeted area. There is a 28 mm associated post biopsy hematoma obscuring the biopsied mass.  ADDENDUM:  Pathology invasive mucinous carcinoma and atypical ductal hyperplasia. This is concordant with imaging findings.    2020  [facility]    Marker Analysis  Helena Romero  ER - >95%  ID - >95%  HER2/lina ([facility]):  - Negative (score: 0)  Ki-67 Proliferation Index: 5%  Mucinous carcinoma.  Glandular (Acinar)/ Tubular Differentiation: Score 2  Nuclear Pleormorphism: Score  1  Mitotic Rate: Score 1  Overall Grade: Grade 1  Tumor Size: 7.0 mm  Ductal Carcinoma In Situ (DCIS): Not Identified  Additional findings: ATYPICAL DUCTAL HYPERPLASIA    09/17/2020  [facility]   Pathology  Helena Romero  Consult Slides for Review:  1. Left Breast, Lower Central, Posterior One:   A. INVASIVE MUCINOUS CARCINOMA, Well-differentiated; Grade I/III (tubule score =2, nuclear score =1, mitoses score =1), measuring at least 7 mm.  B. Associated atypical ductal hyperplasia.  C. Negative for lymphovascular space invasion in this limited sample.  D. Immunohistochemical studies show:  a. ER - 100%  b. HI - 100%  c. HER2 - Negative (0)  d. Ki-67 - 5%      She has not had a breast biopsy in the past.had a left breast core biopsy in 2016 it was reportedly benign.  Has her uterus and ovaries, is postmenopausal, and takes nor hormones.  Her family history includes the following:   Melanoma Sister age 60.  Thyroid cancer same Sister age 60.  Niece, daughter of the above sister, triple negative breast cancer metastatic age 38.  Paternal aunt pancreas cancer  Mother cervical cancer.      She tells me that she has gained 70 pounds over 2 years.      November 5, 2020 left breast OTILIA localized lumpectomy and left axillary sentinel lymph node biopsy pathology returned as invasive mucinous carcinoma, 1.2 cm, low-grade, 2, 1, 1, no lymphovascular space invasion, associated duct carcinoma in situ, 6 mm, low-grade cribriform and solid.  All margins are clear with the closest being 4 mm from the inferior margin.  All margins are clear for precancer being 6 mm from the inferior margin.  Additional margins are benign.  0 of 2 sentinel nodes.  Pathologic stage T1CN0 stage Ia.    Denies significant redness, warmth, drainage from incisions. Denies significant discomfort.    She is back on her xarelto.    Interval History:    In the interim,  Helena Romero  has done well.  She had her Oncotype DX recurrence score returned  November 24, 2020 as a score of 5.  With a distant risk of recurrence at 9 years on hormonal blockade alone of only 3%.  She saw Dr. Cartwright from [place] and was started on Arimidex.  She developed some lower extremity swelling so switched over to Evista and is tolerating this well.  She took whole breast irradiation with Dr. Goldman from December 9 through December 31 for 16 fractions.    She has noted no changes in her breast exam. No new masses, skin changes, nipple changes, nipple discharge either breast.   She denies headache, bone pain, belly pain, cough, changes in vision or gait.    Her most recent imaging includes the following:  [facility] July 28, 2021.  Bilateral screening mammogram with tomosynthesis.  Postlumpectomy changes in the left.  Post radiation changes.  The breasts are almost entirely fatty.  No suspicious findings BI-RADS 2    Review of Systems:  Review of Systems   Constitutional: Positive for unexpected weight change (7 lb wt gain ).   Respiratory: Shortness of breath: ONLY WITH EXERTION     Hematological: Bruises/bleeds easily: ON XARELTO DAILY    All other systems reviewed and are negative.       Past Medical and Surgical History:  Breast Biopsy History:  Patient had not had a breast biopsy prior to her cancer diagnosis.  Breast Cancer HIstory:  Patient does not have a past medical history of breast cancer.  Breast Operations, and year:  NONE   Obstetric/Gynecologic History:  Age menstrual periods began: 12  Patient is postmenopausal due to removal of her uterus in the following year: 2000; AGE 50.    Number of pregnancies: 2  Number of live births: 2  Number of abortions or miscarriages: 0  Age of delivery of first child: 21  Patient did not breast feed.  Length of time taking birth control pills: 5 YRS.   Patient took hormone replacement during the following dates: 7332-1379 MOST RECENT PREMARIN CREAM.   PATIENT HAS NOT TAKEN RECENTLY.   PATIENT HAS UTERUS AND  OVARIES.     Past Surgical History:   Procedure Laterality Date   • BREAST BIOPSY Left 2020    MALIGNANT   • BREAST LUMPECTOMY WITH SENTINEL NODE BIOPSY Left 11/5/2020    Procedure: Left breast charley localized lumpectomy and left axillary sentinel lymph node biopsy;  Surgeon: Inés Jones MD;  Location: [facility];  Service: General;  Laterality: Left;   • CERVICAL CONIZATION, LEEP     • CHOLECYSTECTOMY     • COLONOSCOPY  approx 2011    normal per patient-repeat 10 years   • DILATION AND CURETTAGE, DIAGNOSTIC / THERAPEUTIC     • TONSILLECTOMY     • TUBAL ABDOMINAL LIGATION       Past Medical History:   Diagnosis Date   • Acute bronchitis    • Allergic    • Anesthesia complication     PATIENT STAYS SLEEPY A LONGTIME AFTER SURGERY   • Anxiety    • Asthma    • Atrial fibrillation (HCC)    • Borderline hyperglycemia    • Breast cancer (HCC)    • Cat bite    • Depression    • Diarrhea    • GERD (gastroesophageal reflux disease)    • Hypertension    • Hypocalcemia    • Insomnia    • Low back pain    • Low back pain, episodic    • Need for diphtheria-tetanus-pertussis (Tdap) vaccine    • Neuromuscular disorder (HCC)    • Obesity    • Osteoarthritis, generalized    • Panic attack    • Pneumonia     x 3   • Seasonal allergies    • Sleep disturbances    • Trochanteric bursitis    • Vaginal candidiasis    • Yeast infection        Prior Hospitalizations, other than for surgery or childbirth, and year:  PARTIALLY BLOCKED BOWEL 2013 AFIB 2020    Social History     Socioeconomic History   • Marital status:    Tobacco Use   • Smoking status: Never Smoker   • Smokeless tobacco: Never Used   Vaping Use   • Vaping Use: Never used   Substance and Sexual Activity   • Alcohol use: No   • Drug use: No   • Sexual activity: Defer     Patient is .  Patient is employed full time with the following occupation: MEDICAL BILLING  Patient drinks 3 servings of caffeine per day.    Family History:  Family History   Problem  "Relation Age of Onset   • Pancreatic cancer Paternal Aunt    • Cervical cancer Mother 41   • Melanoma Sister 60   • Thyroid cancer Sister 60   • Breast cancer Niece 38        TRIPLE -   • Malig Hyperthermia Neg Hx        Vital Signs:  /72   Pulse 74   Temp 97.1 °F (36.2 °C)   Ht 165.1 cm (65\")   Wt 122 kg (268 lb)   LMP  (LMP Unknown)   SpO2 95%   Breastfeeding No   BMI 44.60 kg/m²      Medications:    Current Outpatient Medications:   •  Acetaminophen (TYLENOL 8 HOUR ARTHRITIS PAIN PO), Take 650 mg by mouth Daily As Needed., Disp: , Rfl:   •  atorvastatin (LIPITOR) 20 MG tablet, TAKE 1 TABLET BY MOUTH DAILY, Disp: 90 tablet, Rfl: 1  •  busPIRone (BUSPAR) 15 MG tablet, Take 1 tablet by mouth 3 (Three) Times a Day., Disp: 270 tablet, Rfl: 1  •  dicyclomine (BENTYL) 20 MG tablet, Take 1 tablet by mouth Every 6 (Six) Hours., Disp: 20 tablet, Rfl: 0  •  diphenhydrAMINE (BENADRYL) 25 mg capsule, Take 25 mg by mouth At Night As Needed for Sleep., Disp: , Rfl:   •  exemestane (AROMASIN) 25 MG chemo tablet, Take 1 tablet by mouth Daily., Disp: 90 tablet, Rfl: 1  •  fexofenadine (ALLEGRA) 180 MG tablet, Take 180 mg by mouth Daily., Disp: , Rfl:   •  fluconazole (DIFLUCAN) 150 MG tablet, Take 150 mg by mouth 1 (One) Time., Disp: , Rfl:   •  gabapentin (NEURONTIN) 100 MG capsule, TAKE 3 CAPSULES BY MOUTH EVERY NIGHT, Disp: 90 capsule, Rfl: 1  •  guaiFENesin (Mucinex) 600 MG 12 hr tablet, Take  by mouth., Disp: , Rfl:   •  hydroCHLOROthiazide (HYDRODIURIL) 12.5 MG tablet, TAKE 1 TABLET BY MOUTH DAILY, Disp: 90 tablet, Rfl: 2  •  metoprolol succinate XL (TOPROL-XL) 25 MG 24 hr tablet, Take 1 tablet by mouth 2 (two) times a day., Disp: 180 tablet, Rfl: 1  •  olmesartan (BENICAR) 40 MG tablet, TAKE 1 TABLET BY MOUTH DAILY, Disp: 90 tablet, Rfl: 1  •  omeprazole (priLOSEC) 20 MG capsule, TAKE 1 CAPSULE BY MOUTH DAILY, Disp: 90 capsule, Rfl: 1  •  polyethylene glycol (MiraLax) 17 GM/SCOOP powder, Take 17 g by mouth " "Daily. Take cap of powder with 8oz water daily surrounding surgery and while on narcotic, Disp: 119 g, Rfl: 0  •  rivaroxaban (XARELTO) 20 MG tablet, Take 20 mg by mouth Daily., Disp: , Rfl:   •  cephalexin (KEFLEX) 500 MG capsule, Take 1 capsule by mouth 2 (Two) Times a Day., Disp: 14 capsule, Rfl: 0  •  ondansetron ODT (ZOFRAN-ODT) 4 MG disintegrating tablet, Place 1 tablet on the tongue 4 (Four) Times a Day As Needed for Nausea or Vomiting., Disp: 15 tablet, Rfl: 0     Allergies:  Allergies   Allergen Reactions   • Codeine Nausea Only   • Lisinopril Hives       Physical Examination:  /72   Pulse 74   Temp 97.1 °F (36.2 °C)   Ht 165.1 cm (65\")   Wt 122 kg (268 lb)   LMP  (LMP Unknown)   SpO2 95%   Breastfeeding No   BMI 44.60 kg/m²      General Appearance:  Patient is in no distress.  She is well kept and has an morbidly obese build.   Psychiatric:  Patient with appropriate mood and affect. Alert and oriented to self, time, and place.    Breast, RIGHT:  medium sized, 44-46D, symmetric with the contralateral side.  Breast skin is without erythema, edema, rashes.  There are no visible abnormalities upon inspection during the arm-raising maneuver or with hands on hips in the sitting position. There is no nipple retraction, discharge or nipple/areolar skin changes.There are no masses palpable in the sitting or supine positions.    Breast, LEFT:  medium sized, 44-46D,  symmetric with the contralateral side.  Breast skin is without erythema, edema, rashes.  There are no visible abnormalities upon inspection during the arm-raising maneuver or with hands on hips in the sitting position. There is no nipple retraction, discharge or nipple/areolar skin changes.There are no masses palpable in the sitting or supine positions.  Left breast well healed  LOQ radial incision from  lumpectomy.      Lymphatic:  There is no axillary, cervical, infraclavicular, or supraclavicular adenopathy bilaterally.  " Well-healed left axillary sentinel lymph node biopsy.    Eyes:  Pupils are round and reactive to light.  Cardiovascular:  Heart rate and rhythm are regular.  Respiratory:  Lungs are clear bilaterally with no crackles or wheezes in any lung field.  Gastrointestinal:  Abdomen is soft, nondistended, and nontender.   Musculoskeletal:  Good strength in all 4 extremities.   There is good range of motion in both shoulders.  Skin:  No new skin lesions or rashes on the skin excluding the breast (see breast exam above).      Imagin2015  [facility]   Mammo  Helena Romero  IMPRESSION:  Negative screening digital mammogram.  BIRADS: 1, Negative.    2016  [facility]    Radiology  Helena Romero  DIAGNOSTIC BILATERAL MAMMO  HISTORY: intermittent, recurrent left breast pain following trauma one year ago.  Right breast new circumscribed, lobulated 4-5 mm nodule at 7:00, 8 cm back from the nipple.  Symptomatic area indicated by the patient anterior aspect of the left lower outer quadrant is clearly marked. No underlying breast pathology is seen.  BILATERAL BREAST US  Right 7:00 in the left breast 7:00 is performed. Corresponding to the new right-sided mammographic nodule is an ovoid solid or complex cystic 3.6 x 2.8 x 4.8 mm nodule at 7:30, 7 cm back from the nipple. This is considered suspicious.  BIRADS: 4    02/15/2016  [facility]  Exams:  1. Ultrasound guided biopsy, right breast.  2. Post procedure digital mammogram, right breast.  US-Guided BX  Right breast 7:30 position 7 cm from the nipple.  Coil shaped clip. A [14] gauge achieve. 5 passes were performed.  Post-Procedure Digital Mammo  Biopsy clip to be in satisfactory position.    02/15/2019  [facility]  Right Breast, Core BX at 7:30:  - Benign Mammary Tissue, Duct Dilatation and Columnar Cell Changes.    2016  [facility]  Amended Report:  This is radiology-pathology  concordant.  Recommend return to bilateral annual screening mammography.    01/29/2018  [facility]    Mammo  Helena Romero  BILATERAL SCREENING MAMMO WITH ROBI  Scattered areas of fibroglandular density.  BIRADS: 1, Negative.    07/28/2020  [facility]    Mammo  Helena Romero  MAMMO ROBI SCREENING BILATERAL  Breasts are almost entirely fatty.  Focal asymmetry in the left breast at 6:00 at posterior depth.  BIRADS: 0    08/10/2020  [facility]    Radiology  Helena Romero  LEFT DIAGNOSTIC MAMMO WITH ROBI  LEFT BREAST US  Family history of breast cancer: niece, at age 38.  9 mm high density mass in the posterior one third lower central left breast. This appears to have increased in size and density when compared to prior images.  Left breast US: No sonographic correlate to the mammographic finding.  BIRADS: 4    Pathology:  09/04/2020  [facility]    Pathology  Helenanoé Romero  MAMMO STEREO BX LEFT BREAST  9 gauge biopsy needle 8 core specimens.  A metallic ribbon shaped biopsy tissue marker was placed.  A left mammogram, obtained immediately post core biopsy, documented sampling of the targeted area. There is a 28 mm associated post biopsy hematoma obscuring the biopsied mass.  ADDENDUM:  Pathology invasive mucinous carcinoma and atypical ductal hyperplasia. This is concordant with imaging findings.    09/04/2020  [facility]    Marker Analysis  Helena Romero  ER - >95%  ID - >95%  HER2/lina ([facility]):  - Negative (score: 0)  Ki-67 Proliferation Index: 5%  Mucinous carcinoma.  Glandular (Acinar)/ Tubular Differentiation: Score 2  Nuclear Pleormorphism: Score 1  Mitotic Rate: Score 1  Overall Grade: Grade 1  Tumor Size: 7.0 mm  Ductal Carcinoma In Situ (DCIS): Not Identified  Additional findings: ATYPICAL DUCTAL HYPERPLASIA    09/17/2020  [facility]   Pathology  Helena Romero  Consult Slides for Review:  2. Left Breast, Lower Central, Posterior One:   E. INVASIVE MUCINOUS CARCINOMA, Well-differentiated; Grade I/III (tubule score =2,  nuclear score =1, mitoses score =1), measuring at least 7 mm.  F. Associated atypical ductal hyperplasia.  G. Negative for lymphovascular space invasion in this limited sample.  H. Immunohistochemical studies show:  a. ER - 100%  b. GA - 100%  c. HER2 - Negative (0)  d. Ki-67 - 5%  11-24-20 oncotype recurrence score 5, risk recurrence at 9 yr on AI alone 3%    Note from Dr. 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  Patient underwent lumpectomy sentinel node.  Recommend 5 years of antihormonal therapy.  She will start anastrozole in the coming days.  She will start radiation with Dr. Goldman in the upcoming week.          Procedures:      Assessment:   Diagnosis Plan   1. Carcinoma of lower-outer quadrant of left female breast, unspecified estrogen receptor status (HCC)     2. Morbid (severe) obesity due to excess calories (HCC)     3. Atrial fibrillation, unspecified type (HCC)     4. FH: breast cancer     5. Family history of melanoma     6. FH: thyroid cancer     7. FH: pancreatic cancer          1-  Left breast 6:00, 4.5 cm from the nipple location of postbiopsy hematoma.  Prebiopsy imaging 9 mm on mammogram, no ultrasound finding.  Patient was unable to tolerate MRI.  7 mm largest measurement in a core.  Invasive mucinous carcinoma, low-grade, 2, 1, 1, associated atypical duct hyperplasia.  No lymphovascular invasion.  Estrogen 100 progesterone 100 HER-2/lina 0 with a Ki-67 of 5%.  Patient did have internal path review.  Clinical stage T1b N0 stage Ia.    November 5, 2020 left breast OTILIA localized lumpectomy and left axillary sentinel lymph node biopsy pathology returned as invasive mucinous carcinoma, 1.2 cm, low-grade, 2, 1, 1, no lymphovascular space invasion, associated duct carcinoma in situ, 6 mm, low-grade cribriform and solid.  All margins are clear with the closest being 4 mm from the inferior margin.  All margins are clear for precancer being 6 mm  from the inferior margin.  Additional margins are benign.  0 of 2 sentinel nodes.  Pathologic stage T1CN0 stage Ia.    Oncotype DX recurrence score returned November 24, 2020 as a score of 5.  With a distant risk of recurrence at 9 years on hormonal blockade alone of only 3%.    - Dr. Cartwright from [place] and was started on Arimidex.  She developed some lower extremity swelling so switched over to Evista and is tolerating this well.  -whole breast irradiation with Dr. Goldman from December 9 through December 31 for 16 fractions.      2-  BMI 43    3-  Atrial fibrillatinon xarelozzy, Dr Prince  In sinus rhythm most of time    4-7  Melanoma Sister age 60.  Thyroid cancer same Sister age 60.  Niece, daughter of the above sister, triple negative breast cancer metastatic age 38.  Paternal aunt pancreas cancer  Mother cervical cancer.    [facility] common hereditary panel 10-10-20- negative for mutation      Plan:  The patient goes by Juliana.  She and I reviewed her interval treatments, consultations, Oncotype score, imaging and imaging reports together today.  She is compliant with her Evista.  Her most recent imaging is in good order.  There is no evidence of recurrence on her examination or by imaging or by symptoms.  Her next routine mammogram will be due at [facility] July 29, 2022.  I gave her this reminder today.  She has said that she is having trouble finding a bra that fits her well so I will write for her a postlumpectomy bra and to have a fitting at her convenience.  She is seeing Dr. Cartwright every 6 months.  I have not given her a routine follow-up in our office.  I asked her to continue her self breast exam and to call us in the future with any concerns and we would be happy to see her back.        Inés Jones MD      Next Appointment:  Return for any future concerns.    Today I spent 20 minutes doing the following: Reviewing records, labs, outside imaging and reports in preparation  for the patient visit; obtaining medical history; performing the physical exam; counseling and educating the patient and any available family or caregivers; ordering medications, tests or procedures; coordinating care with any other physicians on her care team as needed, and documenting all of the above in the medical record as well as sending communications with her other healthcare professionals.    EMR Dragon/transcription disclaimer:    Much of this encounter note is an electronic transcription/translocation of spoken language to printed text.  The electronic translation of spoken language may permit erroneous, or at times, nonsensical words or phrases to be inadvertently transcribed.  Although I have reviewed the note from such areas, some may still exist.

## 2021-11-29 RX ORDER — BUSPIRONE HYDROCHLORIDE 15 MG/1
TABLET ORAL
Qty: 270 TABLET | Refills: 1 | Status: SHIPPED | OUTPATIENT
Start: 2021-11-29 | End: 2022-06-06

## 2021-12-03 DIAGNOSIS — Z17.0 MALIGNANT NEOPLASM OF LOWER-OUTER QUADRANT OF LEFT BREAST OF FEMALE, ESTROGEN RECEPTOR POSITIVE (HCC): ICD-10-CM

## 2021-12-03 DIAGNOSIS — C50.512 MALIGNANT NEOPLASM OF LOWER-OUTER QUADRANT OF LEFT BREAST OF FEMALE, ESTROGEN RECEPTOR POSITIVE (HCC): ICD-10-CM

## 2021-12-03 RX ORDER — EXEMESTANE 25 MG/1
25 TABLET ORAL DAILY
Qty: 90 TABLET | Refills: 1 | Status: SHIPPED | OUTPATIENT
Start: 2021-12-03 | End: 2022-04-12 | Stop reason: SDUPTHER

## 2021-12-14 ENCOUNTER — APPOINTMENT (OUTPATIENT)
Dept: GENERAL RADIOLOGY | Facility: HOSPITAL | Age: 71
End: 2021-12-14

## 2021-12-14 ENCOUNTER — HOSPITAL ENCOUNTER (EMERGENCY)
Facility: HOSPITAL | Age: 71
Discharge: HOME | End: 2021-12-15
Attending: EMERGENCY MEDICINE | Admitting: EMERGENCY MEDICINE

## 2021-12-14 DIAGNOSIS — E87.6 HYPOKALEMIA: ICD-10-CM

## 2021-12-14 DIAGNOSIS — I48.91 ATRIAL FIBRILLATION WITH RAPID VENTRICULAR RESPONSE (HCC): Primary | ICD-10-CM

## 2021-12-14 LAB
ALBUMIN SERPL-MCNC: 4.6 G/DL (ref 3.5–5.2)
ALBUMIN/GLOB SERPL: 1.7 G/DL
ALP SERPL-CCNC: 116 U/L (ref 39–117)
ALT SERPL W P-5'-P-CCNC: 18 U/L (ref 1–33)
ANION GAP SERPL CALCULATED.3IONS-SCNC: 13 MMOL/L (ref 5–15)
AST SERPL-CCNC: 21 U/L (ref 1–32)
BASOPHILS # BLD AUTO: 0.02 10*3/MM3 (ref 0–0.2)
BASOPHILS NFR BLD AUTO: 0.3 % (ref 0–1.5)
BILIRUB SERPL-MCNC: 0.4 MG/DL (ref 0–1.2)
BUN SERPL-MCNC: 9 MG/DL (ref 8–23)
BUN/CREAT SERPL: 9.5 (ref 7–25)
CALCIUM SPEC-SCNC: 9.9 MG/DL (ref 8.6–10.5)
CHLORIDE SERPL-SCNC: 101 MMOL/L (ref 98–107)
CK MB SERPL-CCNC: 1.24 NG/ML
CK SERPL-CCNC: 102 U/L (ref 20–180)
CO2 SERPL-SCNC: 27 MMOL/L (ref 22–29)
CREAT SERPL-MCNC: 0.95 MG/DL (ref 0.57–1)
DEPRECATED RDW RBC AUTO: 43 FL (ref 37–54)
EOSINOPHIL # BLD AUTO: 0.19 10*3/MM3 (ref 0–0.4)
EOSINOPHIL NFR BLD AUTO: 2.5 % (ref 0.3–6.2)
ERYTHROCYTE [DISTWIDTH] IN BLOOD BY AUTOMATED COUNT: 13.2 % (ref 12.3–15.4)
GFR SERPL CREATININE-BSD FRML MDRD: 58 ML/MIN/1.73
GLOBULIN UR ELPH-MCNC: 2.7 GM/DL
GLUCOSE SERPL-MCNC: 145 MG/DL (ref 65–99)
HCT VFR BLD AUTO: 39.4 % (ref 34–46.6)
HGB BLD-MCNC: 13 G/DL (ref 12–15.9)
HOLD SPECIMEN: NORMAL
IMM GRANULOCYTES # BLD AUTO: 0.02 10*3/MM3 (ref 0–0.05)
IMM GRANULOCYTES NFR BLD AUTO: 0.3 % (ref 0–0.5)
LIPASE SERPL-CCNC: 37 U/L (ref 13–60)
LYMPHOCYTES # BLD AUTO: 1.53 10*3/MM3 (ref 0.7–3.1)
LYMPHOCYTES NFR BLD AUTO: 19.8 % (ref 19.6–45.3)
MAGNESIUM SERPL-MCNC: 1.8 MG/DL (ref 1.6–2.4)
MCH RBC QN AUTO: 29.3 PG (ref 26.6–33)
MCHC RBC AUTO-ENTMCNC: 33 G/DL (ref 31.5–35.7)
MCV RBC AUTO: 88.9 FL (ref 79–97)
MONOCYTES # BLD AUTO: 0.52 10*3/MM3 (ref 0.1–0.9)
MONOCYTES NFR BLD AUTO: 6.7 % (ref 5–12)
NEUTROPHILS NFR BLD AUTO: 5.46 10*3/MM3 (ref 1.7–7)
NEUTROPHILS NFR BLD AUTO: 70.4 % (ref 42.7–76)
NRBC BLD AUTO-RTO: 0 /100 WBC (ref 0–0.2)
NT-PROBNP SERPL-MCNC: 473.6 PG/ML (ref 0–900)
PLATELET # BLD AUTO: 246 10*3/MM3 (ref 140–450)
PMV BLD AUTO: 10.4 FL (ref 6–12)
POTASSIUM SERPL-SCNC: 3 MMOL/L (ref 3.5–5.2)
PROT SERPL-MCNC: 7.3 G/DL (ref 6–8.5)
RBC # BLD AUTO: 4.43 10*6/MM3 (ref 3.77–5.28)
SODIUM SERPL-SCNC: 141 MMOL/L (ref 136–145)
T4 FREE SERPL-MCNC: 1.23 NG/DL (ref 0.93–1.7)
TROPONIN I SERPL-MCNC: 0 NG/ML (ref 0–0.6)
TROPONIN I SERPL-MCNC: 0.01 NG/ML (ref 0–0.6)
TSH SERPL DL<=0.05 MIU/L-ACNC: 2.17 UIU/ML (ref 0.27–4.2)
WBC NRBC COR # BLD: 7.74 10*3/MM3 (ref 3.4–10.8)
WHOLE BLOOD HOLD SPECIMEN: NORMAL
WHOLE BLOOD HOLD SPECIMEN: NORMAL

## 2021-12-14 PROCEDURE — 93005 ELECTROCARDIOGRAM TRACING: CPT | Performed by: EMERGENCY MEDICINE

## 2021-12-14 PROCEDURE — 93005 ELECTROCARDIOGRAM TRACING: CPT

## 2021-12-14 PROCEDURE — 82553 CREATINE MB FRACTION: CPT | Performed by: EMERGENCY MEDICINE

## 2021-12-14 PROCEDURE — 80053 COMPREHEN METABOLIC PANEL: CPT | Performed by: EMERGENCY MEDICINE

## 2021-12-14 PROCEDURE — 83690 ASSAY OF LIPASE: CPT | Performed by: EMERGENCY MEDICINE

## 2021-12-14 PROCEDURE — 84443 ASSAY THYROID STIM HORMONE: CPT | Performed by: EMERGENCY MEDICINE

## 2021-12-14 PROCEDURE — 84484 ASSAY OF TROPONIN QUANT: CPT

## 2021-12-14 PROCEDURE — 99283 EMERGENCY DEPT VISIT LOW MDM: CPT

## 2021-12-14 PROCEDURE — 84439 ASSAY OF FREE THYROXINE: CPT | Performed by: EMERGENCY MEDICINE

## 2021-12-14 PROCEDURE — 85025 COMPLETE CBC W/AUTO DIFF WBC: CPT

## 2021-12-14 PROCEDURE — 36415 COLL VENOUS BLD VENIPUNCTURE: CPT

## 2021-12-14 PROCEDURE — 71045 X-RAY EXAM CHEST 1 VIEW: CPT

## 2021-12-14 PROCEDURE — 83880 ASSAY OF NATRIURETIC PEPTIDE: CPT

## 2021-12-14 PROCEDURE — 96374 THER/PROPH/DIAG INJ IV PUSH: CPT

## 2021-12-14 PROCEDURE — 82550 ASSAY OF CK (CPK): CPT | Performed by: EMERGENCY MEDICINE

## 2021-12-14 PROCEDURE — 93010 ELECTROCARDIOGRAM REPORT: CPT | Performed by: INTERNAL MEDICINE

## 2021-12-14 PROCEDURE — 96376 TX/PRO/DX INJ SAME DRUG ADON: CPT

## 2021-12-14 PROCEDURE — 83735 ASSAY OF MAGNESIUM: CPT | Performed by: EMERGENCY MEDICINE

## 2021-12-14 RX ORDER — POTASSIUM CHLORIDE 750 MG/1
40 CAPSULE, EXTENDED RELEASE ORAL ONCE
Status: COMPLETED | OUTPATIENT
Start: 2021-12-14 | End: 2021-12-14

## 2021-12-14 RX ORDER — SODIUM CHLORIDE 0.9 % (FLUSH) 0.9 %
10 SYRINGE (ML) INJECTION AS NEEDED
Status: DISCONTINUED | OUTPATIENT
Start: 2021-12-14 | End: 2021-12-15

## 2021-12-14 RX ORDER — ASPIRIN 81 MG/1
324 TABLET, CHEWABLE ORAL ONCE
Status: DISCONTINUED | OUTPATIENT
Start: 2021-12-14 | End: 2021-12-15 | Stop reason: HOSPADM

## 2021-12-14 RX ADMIN — METOROPROLOL TARTRATE 5 MG: 5 INJECTION, SOLUTION INTRAVENOUS at 23:54

## 2021-12-14 RX ADMIN — METOROPROLOL TARTRATE 5 MG: 5 INJECTION, SOLUTION INTRAVENOUS at 22:54

## 2021-12-14 RX ADMIN — POTASSIUM CHLORIDE 40 MEQ: 750 CAPSULE, EXTENDED RELEASE ORAL at 22:54

## 2021-12-15 VITALS
TEMPERATURE: 98.6 F | HEIGHT: 65 IN | OXYGEN SATURATION: 97 % | BODY MASS INDEX: 45.44 KG/M2 | WEIGHT: 272.71 LBS | HEART RATE: 76 BPM | DIASTOLIC BLOOD PRESSURE: 62 MMHG | SYSTOLIC BLOOD PRESSURE: 146 MMHG | RESPIRATION RATE: 20 BRPM

## 2021-12-15 PROCEDURE — 93010 ELECTROCARDIOGRAM REPORT: CPT | Performed by: INTERNAL MEDICINE

## 2021-12-15 PROCEDURE — 96376 TX/PRO/DX INJ SAME DRUG ADON: CPT

## 2021-12-15 PROCEDURE — 93005 ELECTROCARDIOGRAM TRACING: CPT | Performed by: EMERGENCY MEDICINE

## 2021-12-15 RX ORDER — METOPROLOL SUCCINATE 25 MG/1
25 TABLET, EXTENDED RELEASE ORAL ONCE
Status: COMPLETED | OUTPATIENT
Start: 2021-12-15 | End: 2021-12-15

## 2021-12-15 RX ORDER — METOPROLOL TARTRATE 5 MG/5ML
INJECTION INTRAVENOUS
Status: COMPLETED
Start: 2021-12-15 | End: 2021-12-15

## 2021-12-15 RX ADMIN — METOPROLOL SUCCINATE 25 MG: 25 TABLET, EXTENDED RELEASE ORAL at 02:06

## 2021-12-15 RX ADMIN — METOROPROLOL TARTRATE 5 MG: 5 INJECTION, SOLUTION INTRAVENOUS at 00:31

## 2021-12-15 NOTE — DISCHARGE INSTRUCTIONS
Please follow-up with your cardiologist today.    Please increase your Toprol-XL to 50 mg twice a day    No strenuous activity until released by the cardiologist.    Please return to the emergency room for worsening chest pain, radiating chest pain, shortness of breath, near passing out, passing out, extreme fatigue, extreme sweating, nausea or vomiting or new or worrisome symptoms    Please have your doctor recheck a potassium level within 48 hours.  Discuss possible need for chronic supplementation to your potassium level

## 2021-12-15 NOTE — ED NOTES
ED Monitor tech called, notified nurse that the patient's heart rate had converted over from A-Fib to Sinus Rhythm. Provider (Dr. Frias) Notified. Provider gave verbal order for EKG (during downtime). EKG completed by tech. Patient provided update on plan of care.      Dahiana Cleary, RN  12/15/21 [id number]

## 2021-12-15 NOTE — ED PROVIDER NOTES
Time: 10:07 PM EST  Arrived by: private car  Chief Complaint: atrial fibrillation  History provided by: patient  History is limited by: N/A     History of Present Illness:  Patient is a 71 y.o. year old female that presents to the emergency department with atrial fibrillation. Pt is on Metoprolol 25mg twice daily. Pt states that her cardiologist told her to take 1 and then wait 30 minutes and take another.. Pt was alerted by her Apple watch that her HR was elevated and she was in A-fib. Pt was sitting down eating supper at onset. Pt denies any chest pain, chest pressure, shortness of breath, or fatigue. Pt reports nausea. Pt denies any fever, chills, or rigors. Pt has chronic diarrhea that's unchanged. Pt denies any recent medication changes. Pt states she took 25mg of Metoprolol at 7pm and again at 7:30pm, which didn't relieve her symptoms.     Pt denies any thyroid problems.       Similar Symptoms Previously: no  Recently seen: yes      Patient Care Team  Primary Care Provider: Ally Lagos MD  Cardiologist: Niki    Past Medical History:     Allergies   Allergen Reactions   • Codeine Nausea Only   • Lisinopril Hives     Past Medical History:   Diagnosis Date   • Acute bronchitis    • Allergic    • Anesthesia complication     PATIENT STAYS SLEEPY A LONGTIME AFTER SURGERY   • Anxiety    • Asthma    • Atrial fibrillation (HCC)    • Borderline hyperglycemia    • Breast cancer (HCC)    • Cat bite    • Depression    • Diarrhea    • GERD (gastroesophageal reflux disease)    • Hypertension    • Hypocalcemia    • Insomnia    • Low back pain    • Low back pain, episodic    • Need for diphtheria-tetanus-pertussis (Tdap) vaccine    • Neuromuscular disorder (HCC)    • Obesity    • Osteoarthritis, generalized    • Panic attack    • Pneumonia     x 3   • Seasonal allergies    • Sleep disturbances    • Trochanteric bursitis    • Vaginal candidiasis    • Yeast infection      Past Surgical History:   Procedure Laterality  Date   • BREAST BIOPSY Left 2020    MALIGNANT   • BREAST LUMPECTOMY WITH SENTINEL NODE BIOPSY Left 11/5/2020    Procedure: Left breast charley localized lumpectomy and left axillary sentinel lymph node biopsy;  Surgeon: Inés Jones MD;  Location: [facility] OR;  Service: General;  Laterality: Left;   • CERVICAL CONIZATION, LEEP     • CHOLECYSTECTOMY     • COLONOSCOPY  approx 2011    normal per patient-repeat 10 years   • DILATION AND CURETTAGE, DIAGNOSTIC / THERAPEUTIC     • TONSILLECTOMY     • TUBAL ABDOMINAL LIGATION       Family History   Problem Relation Age of Onset   • Pancreatic cancer Paternal Aunt    • Cervical cancer Mother 41   • Melanoma Sister 60   • Thyroid cancer Sister 60   • Breast cancer Niece 38        TRIPLE -   • Malig Hyperthermia Neg Hx        Home Medications:  Prior to Admission medications    Medication Sig Start Date End Date Taking? Authorizing Provider   Acetaminophen (TYLENOL 8 HOUR ARTHRITIS PAIN PO) Take 650 mg by mouth Daily As Needed.   Yes Mark Patterson MD   atorvastatin (LIPITOR) 20 MG tablet TAKE 1 TABLET BY MOUTH DAILY 7/19/21  Yes Ally Lagos MD   busPIRone (BUSPAR) 15 MG tablet TAKE 1 TABLET BY MOUTH THREE TIMES DAILY 11/29/21  Yes Ally Lagos MD   diphenhydrAMINE (BENADRYL) 25 mg capsule Take 25 mg by mouth At Night As Needed for Sleep.   Yes ProviderMark MD   exemestane (AROMASIN) 25 MG chemo tablet TAKE 1 TABLET BY MOUTH DAILY 12/3/21  Yes Ludy Cartwright MD PhD   fexofenadine (ALLEGRA) 180 MG tablet Take 180 mg by mouth Daily.   Yes Mark Patterson MD   gabapentin (NEURONTIN) 100 MG capsule TAKE 3 CAPSULES BY MOUTH EVERY NIGHT 10/19/21  Yes Ally Lagos MD   guaiFENesin (Mucinex) 600 MG 12 hr tablet Take  by mouth.   Yes Mark Patterson MD   hydroCHLOROthiazide (HYDRODIURIL) 12.5 MG tablet TAKE 1 TABLET BY MOUTH DAILY 9/20/21 12/19/21 Yes Ally Lagos MD   metoprolol succinate XL (TOPROL-XL) 25 MG 24 hr tablet Take 1  tablet by mouth 2 (two) times a day. 1/25/21  Yes Ally Lagos MD   olmesartan (BENICAR) 40 MG tablet TAKE 1 TABLET BY MOUTH DAILY 8/2/21  Yes Ally Lagos MD   omeprazole (priLOSEC) 20 MG capsule TAKE 1 CAPSULE BY MOUTH DAILY 9/2/21  Yes Ally Lagos MD   ondansetron ODT (ZOFRAN-ODT) 4 MG disintegrating tablet Place 1 tablet on the tongue 4 (Four) Times a Day As Needed for Nausea or Vomiting. 10/20/21  Yes Dotty Chavez APRN   rivaroxaban (XARELTO) 20 MG tablet Take 20 mg by mouth Daily.   Yes ProviderMark MD   dicyclomine (BENTYL) 20 MG tablet Take 1 tablet by mouth Every 6 (Six) Hours. 10/20/21   Dotty Chavez APRN   polyethylene glycol (MiraLax) 17 GM/SCOOP powder Take 17 g by mouth Daily. Take cap of powder with 8oz water daily surrounding surgery and while on narcotic 9/28/20   Inés Jones MD   cephalexin (KEFLEX) 500 MG capsule Take 1 capsule by mouth 2 (Two) Times a Day. 10/20/21 12/14/21  Dotty Chavez APRN   fluconazole (DIFLUCAN) 150 MG tablet Take 150 mg by mouth 1 (One) Time. 7/12/21 12/14/21  ProviderMark MD        Social History:   Social History     Tobacco Use   • Smoking status: Never Smoker   • Smokeless tobacco: Never Used   Vaping Use   • Vaping Use: Never used   Substance Use Topics   • Alcohol use: No   • Drug use: No         Review of Systems:  Review of Systems   Constitutional: Negative for chills, fatigue and fever.   HENT: Negative for nosebleeds.    Eyes: Negative for redness.   Respiratory: Negative for cough and shortness of breath.    Cardiovascular: Negative for chest pain.   Gastrointestinal: Positive for diarrhea (chronic) and nausea. Negative for vomiting.   Genitourinary: Negative for dysuria and frequency.   Musculoskeletal: Negative for back pain and neck pain.   Skin: Negative for rash.   Neurological: Negative for seizures and syncope.        Physical Exam:  /62   Pulse 76   Temp 98.6 °F (37 °C) (Oral)   Resp 20   Ht 165.1  "cm (65\")   Wt 124 kg (272 lb 11.3 oz)   LMP  (LMP Unknown)   SpO2 97%   BMI 45.38 kg/m²     Physical Exam  Vitals and nursing note reviewed.   Constitutional:       General: She is not in acute distress.     Appearance: Normal appearance. She is not toxic-appearing.   HENT:      Head: Normocephalic and atraumatic.      Nose: Nose normal.      Mouth/Throat:      Pharynx: Oropharynx is clear.   Eyes:      General: Lids are normal. No scleral icterus.     Conjunctiva/sclera: Conjunctivae normal.   Cardiovascular:      Rate and Rhythm: Tachycardia present. Rhythm irregular.      Pulses: Normal pulses.      Heart sounds: Normal heart sounds. No murmur heard.      Pulmonary:      Effort: Pulmonary effort is normal. No respiratory distress.      Breath sounds: Normal breath sounds and air entry. No wheezing, rhonchi or rales.      Comments: Lungs are clear bilaterally.   Chest:      Chest wall: No tenderness.   Abdominal:      Palpations: Abdomen is soft.      Tenderness: There is no abdominal tenderness. There is no guarding or rebound.      Comments: No rigidity.   Musculoskeletal:         General: No tenderness. Normal range of motion.      Cervical back: Normal range of motion and neck supple.      Right lower leg: Edema (leg and ankle) present.      Left lower leg: Edema (leg and ankle) present.   Skin:     General: Skin is warm and dry.      Findings: No rash.   Neurological:      Mental Status: She is alert and oriented to person, place, and time. Mental status is at baseline.      Sensory: Sensation is intact.      Motor: Motor function is intact.   Psychiatric:         Mood and Affect: Mood normal.         Behavior: Behavior normal.                Medications in the Emergency Department:  Medications   aspirin chewable tablet 324 mg (324 mg Oral Not Given 12/14/21 2694)   metoprolol tartrate (LOPRESSOR) injection 5 mg (5 mg Intravenous Given 12/15/21 0031)   metoprolol tartrate (LOPRESSOR) injection 5 mg (5 mg " Intravenous Given 12/14/21 2254)   potassium chloride (MICRO-K) CR capsule 40 mEq (40 mEq Oral Given 12/14/21 2254)   metoprolol tartrate (LOPRESSOR) injection 5 mg (5 mg Intravenous Given 12/14/21 2354)   metoprolol succinate XL (TOPROL-XL) 24 hr tablet 25 mg (25 mg Oral Given 12/15/21 0206)        Labs  Lab Results (last 24 hours)     Procedure Component Value Units Date/Time    POC Troponin I with Hold Tube [[id number]] Collected: 12/14/21 2110    Specimen: Blood Updated: 12/14/21 2318    Narrative:      The following orders were created for panel order POC Troponin I with Hold Tube.  Procedure                               Abnormality         Status                     ---------                               -----------         ------                     POC Troponin I[[id number]]                                                              HOLD Troponin-I Tube[[id number]]                             Final result                 Please view results for these tests on the individual orders.    CBC & Differential [[id number]]  (Normal) Collected: 12/14/21 2110    Specimen: Blood from Arm, Right Updated: 12/14/21 2132    Narrative:      The following orders were created for panel order CBC & Differential.  Procedure                               Abnormality         Status                     ---------                               -----------         ------                     CBC Auto Differential[[id number]]        Normal              Final result                 Please view results for these tests on the individual orders.    Comprehensive Metabolic Panel [[id number]]  (Abnormal) Collected: 12/14/21 2110    Specimen: Blood from Arm, Right Updated: 12/14/21 2157     Glucose 145 mg/dL      BUN 9 mg/dL      Creatinine 0.95 mg/dL      Sodium 141 mmol/L      Potassium 3.0 mmol/L      Chloride 101 mmol/L      CO2 27.0 mmol/L      Calcium 9.9 mg/dL      Total Protein 7.3 g/dL      Albumin 4.60 g/dL      ALT (SGPT) 18 U/L       AST (SGOT) 21 U/L      Alkaline Phosphatase 116 U/L      Total Bilirubin 0.4 mg/dL      eGFR Non African Amer 58 mL/min/1.73      Globulin 2.7 gm/dL      A/G Ratio 1.7 g/dL      BUN/Creatinine Ratio 9.5     Anion Gap 13.0 mmol/L     Narrative:      GFR Normal >60  Chronic Kidney Disease <60  Kidney Failure <15      Lipase [[id number]]  (Normal) Collected: 12/14/21 2110    Specimen: Blood from Arm, Right Updated: 12/14/21 2157     Lipase 37 U/L     BNP [[id number]]  (Normal) Collected: 12/14/21 2110    Specimen: Blood from Arm, Right Updated: 12/14/21 2151     proBNP 473.6 pg/mL     Narrative:      Among patients with dyspnea, NT-proBNP is highly sensitive for the detection of acute congestive heart failure. In addition NT-proBNP of <300 pg/ml effectively rules out acute congestive heart failure with 99% negative predictive value.    Results may be falsely decreased if patient taking Biotin.      Magnesium [[id number]]  (Normal) Collected: 12/14/21 2110    Specimen: Blood from Arm, Right Updated: 12/14/21 2157     Magnesium 1.8 mg/dL     CK Total & CKMB [[id number]]  (Normal) Collected: 12/14/21 2110    Specimen: Blood from Arm, Right Updated: 12/14/21 2157     CKMB 1.24 ng/mL      Creatine Kinase 102 U/L     Narrative:      CKMB results may be falsely decreased if patient taking Biotin.    CBC Auto Differential [[id number]]  (Normal) Collected: 12/14/21 2110    Specimen: Blood from Arm, Right Updated: 12/14/21 2132     WBC 7.74 10*3/mm3      RBC 4.43 10*6/mm3      Hemoglobin 13.0 g/dL      Hematocrit 39.4 %      MCV 88.9 fL      MCH 29.3 pg      MCHC 33.0 g/dL      RDW 13.2 %      RDW-SD 43.0 fl      MPV 10.4 fL      Platelets 246 10*3/mm3      Neutrophil % 70.4 %      Lymphocyte % 19.8 %      Monocyte % 6.7 %      Eosinophil % 2.5 %      Basophil % 0.3 %      Immature Grans % 0.3 %      Neutrophils, Absolute 5.46 10*3/mm3      Lymphocytes, Absolute 1.53 10*3/mm3      Monocytes, Absolute 0.52 10*3/mm3      Eosinophils,  Absolute 0.19 10*3/mm3      Basophils, Absolute 0.02 10*3/mm3      Immature Grans, Absolute 0.02 10*3/mm3      nRBC 0.0 /100 WBC     POC Troponin I [[id number]]  (Normal) Collected: 12/14/21 2110    Specimen: Blood Updated: 12/14/21 2122     Troponin I 0.00 ng/mL      Comment: Serial Number: [id number]:  [id number]       T4, Free [[id number]]  (Normal) Collected: 12/14/21 2110    Specimen: Blood from Arm, Right Updated: 12/14/21 2253     Free T4 1.23 ng/dL     Narrative:      Results may be falsely increased if patient taking Biotin.      TSH [[id number]]  (Normal) Collected: 12/14/21 2110    Specimen: Blood from Arm, Right Updated: 12/14/21 2253     TSH 2.170 uIU/mL     POC Troponin I [[id number]]  (Normal) Collected: 12/14/21 2331    Specimen: Blood Updated: 12/14/21 2343     Troponin I 0.01 ng/mL      Comment: Serial Number: [id number]:  [id number]              Imaging:  XR Chest 1 View    Result Date: 12/14/2021  PROCEDURE: XR CHEST 1 VW  COMPARISON: [facility], , CHEST AP/PA 1 VIEW, 3/02/2020, 22:58.  INDICATIONS: Chest pain.  FINDINGS: A single AP upright portable view of the chest is provided for review.  Bilateral infiltrates are seen.  The findings may represent infectious multifocal pneumonia.  Atelectasis may also be present, especially in the mid to basilar lung zones.  Borderline cardiac enlargement is possible.  No pneumothorax.  No pneumomediastinum.  No pleural effusion.  There is slight pulmonary hypoinflation.  External artifacts obscure detail.  The thoracic aorta is atherosclerotic.  CONCLUSION: Bilateral infiltrates are possible.  There may be bilateral subsegmental atelectasis, as well.  The findings may represent infectious multifocal pneumonia.  Pulmonary edema is possible.  Subsegmental atelectasis, present bilaterally, may account for the radiographic appearance.       HUE KAYE JR, MD       Electronically Signed and Approved By: HUE KAYE JR, MD on 12/14/2021 at 22:50                Procedures:  Procedures    Progress  ED Course as of 12/15/21 0541   Tue Dec 14, 2021   2114 EKG:    Rhythm: Atrial fibrillation with rapid ventricular response  Rate: 139  Intervals: Normal QT interval  T-wave: There is baseline artifact, nonspecific T wave flattening, no obvious T wave inversion or hyperacute T waves  ST Segment: No obvious pathological ST elevation or ST depression    EKG Comparison: There is none available for comparison    Interpreted by me   [SD]   Wed Dec 15, 2021   0239 EKG:    Rhythm: Sinus rhythm  Rate: 80  Intervals: Normal MA and QT interval  T-wave: Nonspecific T wave flattening in III  ST Segment: No obvious ST elevation or ST depression    EKG Comparison: This EKG is improved, the the patient's rhythm is now sinus.    Interpreted by me   [SD]      ED Course User Index  [SD] Dilip Frias DO                            Medical Decision Making:  MDM  Number of Diagnoses or Management Options  Atrial fibrillation with rapid ventricular response (HCC)  Hypokalemia  Diagnosis management comments:       The patient presented today with atrial fibrillation with rapid ventricular response.  The patient does have a history of paroxysmal atrial fibrillation that she takes Toprol-XL and Xarelto for.  The patient's EKG demonstrated A. fib with an accelerated rated ventricular response.  There is no obvious acute ST changes.  The patient had a normal troponin x2, 2 hours apart.  Patient had a normal TSH and a normal T4.  The patient had a normal magnesium.  The patient had a normal BNP.  The patient's CBC essentially was within normal limits with a normal white blood cell count, hemoglobin hematocrit.  The patient's chemistry essentially was within normal lites with a slightly low potassium at 3.0.  This was replaced with oral potassium.  Patient's chest x-ray demonstrated possible bilateral infiltrates versus subsegmental atelectasis.  Multifocal pneumonia could be possible.   However this is thought to be less likely due to the fact that the patient had no upper respiratory symptoms including cough, fever, myalgias.  Pulmonary edema was also possible.  However the patient had a normal BNP and had no clinical signs consistent with congestive heart failure.  The patient was given 5 mg of Lopressor IV while in the department.  This did appear to control her rate.  The patient was then given Toprol XL 25 mg p.o.  Within about 30 minutes of giving the Toprol-XL.  The patient did convert to a sinus rhythm.  At the time of discharge, the patient's vital signs were stable and the patient had a normal sinus rhythm.  The patient will follow up with her cardiologist today.  She was comfortable with that plan.  She will increase her Toprol to 50 mg twice a day.  Again, the patient had no chest pain, near syncope, syncope, unusual sweating or abnormal shortness of breath.  Patient appears appropriate for discharge and outpatient follow-up.       Amount and/or Complexity of Data Reviewed  Clinical lab tests: reviewed  Tests in the radiology section of CPT®: reviewed  Tests in the medicine section of CPT®: reviewed  Discuss the patient with other providers: yes (I discussed the case with the midlevel provider that was on-call for Dr. Gerson Sun.  We have discussed the patient in detail.  The patient's presenting symptoms, EKG, physical exam, chest x-ray, laboratory values.  She request that the patient double up on her Toprol-XL to 50 mg twice a day and to follow-up with Dr. Matthew Sun later today.  I have discussed this with the patient and she feels comfortable for discharge and outpatient follow-up with her cardiologist today.)                 Final diagnoses:   Atrial fibrillation with rapid ventricular response (HCC)   Hypokalemia        Disposition:  ED Disposition     ED Disposition Condition Comment    Discharge Stable           This medical record created using voice recognition software  and may contain unintended errors.    Documentation assistance provided by Linda De León acting as scribe for Dilip Frias DO. Information recorded by the scribe was done at my direction and has been verified and validated by me.          Linda De León  12/14/21 2214       Linda De León  12/14/21 2225       Linda De León  12/15/21 0105       Dilip Frias DO  12/16/21 0581

## 2021-12-15 NOTE — ED NOTES
Monitor tech sent down strip of patient's converted rhythm.      Dahiana Cleary, RN  12/15/21 0194

## 2021-12-15 NOTE — ED NOTES
Patient assisted to bedside commode, patient tolerated well.      Dahiana Cleary, RN  12/15/21 3165

## 2021-12-20 ENCOUNTER — TELEPHONE (OUTPATIENT)
Dept: INTERNAL MEDICINE | Facility: CLINIC | Age: 71
End: 2021-12-20

## 2021-12-20 DIAGNOSIS — E87.6 HYPOKALEMIA: Primary | ICD-10-CM

## 2021-12-20 NOTE — TELEPHONE ENCOUNTER
Patient was seen in the ER for afib and she was told her potassium was low patient is wanting to know if we have checked her potassium or if she needs to have labs done, please call [phone].

## 2021-12-21 LAB
QT INTERVAL: 296 MS
QT INTERVAL: 307 MS
QT INTERVAL: 331 MS
QT INTERVAL: 333 MS
QT INTERVAL: 353 MS

## 2021-12-22 ENCOUNTER — TELEPHONE (OUTPATIENT)
Dept: INTERNAL MEDICINE | Facility: CLINIC | Age: 71
End: 2021-12-22

## 2021-12-22 NOTE — TELEPHONE ENCOUNTER
Does she mean CXR from ER?  If so, it looks like they thought it could be some fluid and/or atelectasis which is related to not taking good deep breaths.  Find out more about what she is thinking.

## 2021-12-22 NOTE — TELEPHONE ENCOUNTER
Spoke with PT, she said she feels fine, will continue taking deep breaths and will let us know if anything changes

## 2021-12-22 NOTE — TELEPHONE ENCOUNTER
Caller: Helena Romero    Relationship: Self    Best call back number: [phone] (H    Caller requesting test results: PATIENT    What test was performed: CHEST XRAY     When was the test performed:12/14/21    Where was the test performed: [facility]    Additional notes: PLEASE CALL PATIENT IN REGARDS TO THE RESULTS OF HER CHEST XRAY. STATES THAT SHE LOOKED ON MY CHART AND IT LOOKS LIKE THE RESULTS SAY SHE HAS A TOUCH OF PNEUMONIA, AND WOULD LIKE TO GET ON TOP OF IT QUICKLY. PATIENT STATES SHE HAS NO SYMPTOMS OTHER THAN CONGESTION. PLEASE CALL AND ADVISE. THANK YOU

## 2021-12-27 DIAGNOSIS — M19.91 PRIMARY OSTEOARTHRITIS, UNSPECIFIED SITE: ICD-10-CM

## 2021-12-27 RX ORDER — GABAPENTIN 100 MG/1
300 CAPSULE ORAL NIGHTLY
Qty: 90 CAPSULE | Refills: 5 | Status: SHIPPED | OUTPATIENT
Start: 2021-12-27 | End: 2022-08-08

## 2022-01-03 ENCOUNTER — LAB (OUTPATIENT)
Dept: LAB | Facility: HOSPITAL | Age: 72
End: 2022-01-03

## 2022-01-03 PROCEDURE — 80048 BASIC METABOLIC PNL TOTAL CA: CPT | Performed by: INTERNAL MEDICINE

## 2022-01-03 PROCEDURE — 83735 ASSAY OF MAGNESIUM: CPT | Performed by: INTERNAL MEDICINE

## 2022-01-03 PROCEDURE — 36415 COLL VENOUS BLD VENIPUNCTURE: CPT | Performed by: INTERNAL MEDICINE

## 2022-01-04 LAB
BUN SERPL-MCNC: 12 MG/DL (ref 8–27)
BUN/CREAT SERPL: 13 (ref 12–28)
CALCIUM SERPL-MCNC: 9.1 MG/DL (ref 8.7–10.3)
CHLORIDE SERPL-SCNC: 105 MMOL/L (ref 96–106)
CO2 SERPL-SCNC: 23 MMOL/L (ref 20–29)
CREAT SERPL-MCNC: 0.93 MG/DL (ref 0.57–1)
GLUCOSE SERPL-MCNC: 97 MG/DL (ref 65–99)
MAGNESIUM SERPL-MCNC: 2.1 MG/DL (ref 1.6–2.3)
POTASSIUM SERPL-SCNC: 4.3 MMOL/L (ref 3.5–5.2)
SODIUM SERPL-SCNC: 143 MMOL/L (ref 134–144)

## 2022-01-31 RX ORDER — OLMESARTAN MEDOXOMIL 40 MG/1
40 TABLET ORAL DAILY
Qty: 90 TABLET | Refills: 1 | Status: SHIPPED | OUTPATIENT
Start: 2022-01-31 | End: 2022-07-28

## 2022-02-03 RX ORDER — ATORVASTATIN CALCIUM 20 MG/1
20 TABLET, FILM COATED ORAL DAILY
Qty: 90 TABLET | Refills: 1 | Status: SHIPPED | OUTPATIENT
Start: 2022-02-03 | End: 2022-08-31

## 2022-02-28 ENCOUNTER — OFFICE VISIT (OUTPATIENT)
Dept: INTERNAL MEDICINE | Facility: CLINIC | Age: 72
End: 2022-02-28

## 2022-02-28 VITALS
TEMPERATURE: 97.1 F | HEIGHT: 65 IN | DIASTOLIC BLOOD PRESSURE: 80 MMHG | SYSTOLIC BLOOD PRESSURE: 134 MMHG | WEIGHT: 270 LBS | BODY MASS INDEX: 44.98 KG/M2 | HEART RATE: 70 BPM

## 2022-02-28 DIAGNOSIS — I48.0 PAROXYSMAL ATRIAL FIBRILLATION: ICD-10-CM

## 2022-02-28 DIAGNOSIS — Z00.00 MEDICARE ANNUAL WELLNESS VISIT, SUBSEQUENT: ICD-10-CM

## 2022-02-28 DIAGNOSIS — Z12.11 SCREEN FOR COLON CANCER: Primary | ICD-10-CM

## 2022-02-28 DIAGNOSIS — I10 ESSENTIAL HYPERTENSION: ICD-10-CM

## 2022-02-28 DIAGNOSIS — E66.01 MORBID (SEVERE) OBESITY DUE TO EXCESS CALORIES: ICD-10-CM

## 2022-02-28 PROCEDURE — 1159F MED LIST DOCD IN RCRD: CPT | Performed by: INTERNAL MEDICINE

## 2022-02-28 PROCEDURE — G0439 PPPS, SUBSEQ VISIT: HCPCS | Performed by: INTERNAL MEDICINE

## 2022-03-28 ENCOUNTER — TELEPHONE (OUTPATIENT)
Dept: INTERNAL MEDICINE | Facility: CLINIC | Age: 72
End: 2022-03-28

## 2022-03-28 DIAGNOSIS — R19.5 POSITIVE COLORECTAL CANCER SCREENING USING COLOGUARD TEST: Primary | ICD-10-CM

## 2022-03-28 NOTE — TELEPHONE ENCOUNTER
Called spoke with PT, Gave her # to dr. Marlene Amin pt said she has seen her in the past.  PT wasn't sure if she wanted to do this right now.  I told her it needs to be done sooner than later      I will put referral in for Dr. Amin

## 2022-03-28 NOTE — TELEPHONE ENCOUNTER
Caller: [employer]     Best call back number: [phone]    Who are you requesting to speak with (clinical staff, provider,  specific staff member): CLINICAL STAFF     What was the call regarding: CALLING STATES PATIENT HAS POSITIVE RESULTS AND WANTED TO MAKE OFFICE AWARE IF RESULTS HAVE NOT BEEN RECEIVED PLEASE CALL TO GET RESULTS FAXED     REFERENCE# [id number]

## 2022-03-29 ENCOUNTER — TELEPHONE (OUTPATIENT)
Dept: INTERNAL MEDICINE | Facility: CLINIC | Age: 72
End: 2022-03-29

## 2022-03-29 NOTE — TELEPHONE ENCOUNTER
Caller: Helena Romero    Relationship: Self    Best call back number:[phone]    What form or medical record are you requesting: COLOGUARD RESULTS SENT TO ONCOLOGISTS - DR. SPENCER CASTILLO     Who is requesting this form or medical record from you: PATIENT    How would you like to receive the form or medical records (pick-up, mail, fax): MY CHART     Timeframe paperwork needed: ASAP    Additional notes: WANTS RESULTS SENT TO ONCOLOGIST IF POSSIBLE PLEASE DOWNLOAD INTO MY CHART

## 2022-04-04 ENCOUNTER — APPOINTMENT (OUTPATIENT)
Dept: ONCOLOGY | Facility: HOSPITAL | Age: 72
End: 2022-04-04

## 2022-04-06 ENCOUNTER — OFFICE VISIT (OUTPATIENT)
Dept: ONCOLOGY | Facility: HOSPITAL | Age: 72
End: 2022-04-06

## 2022-04-06 ENCOUNTER — PRE-PROCEDURE SCREENING (OUTPATIENT)
Dept: GASTROENTEROLOGY | Facility: CLINIC | Age: 72
End: 2022-04-06

## 2022-04-06 VITALS
HEART RATE: 64 BPM | BODY MASS INDEX: 44.76 KG/M2 | SYSTOLIC BLOOD PRESSURE: 168 MMHG | TEMPERATURE: 98.5 F | RESPIRATION RATE: 20 BRPM | OXYGEN SATURATION: 99 % | WEIGHT: 268.96 LBS | DIASTOLIC BLOOD PRESSURE: 67 MMHG

## 2022-04-06 DIAGNOSIS — Z17.0 MALIGNANT NEOPLASM OF LOWER-OUTER QUADRANT OF LEFT BREAST OF FEMALE, ESTROGEN RECEPTOR POSITIVE: Primary | ICD-10-CM

## 2022-04-06 DIAGNOSIS — C50.512 MALIGNANT NEOPLASM OF LOWER-OUTER QUADRANT OF LEFT BREAST OF FEMALE, ESTROGEN RECEPTOR POSITIVE: Primary | ICD-10-CM

## 2022-04-06 DIAGNOSIS — Z12.31 ENCOUNTER FOR SCREENING MAMMOGRAM FOR MALIGNANT NEOPLASM OF BREAST: ICD-10-CM

## 2022-04-06 DIAGNOSIS — R19.5 POSITIVE COLORECTAL CANCER SCREENING USING COLOGUARD TEST: ICD-10-CM

## 2022-04-06 PROCEDURE — 99214 OFFICE O/P EST MOD 30 MIN: CPT | Performed by: INTERNAL MEDICINE

## 2022-04-06 PROCEDURE — G0463 HOSPITAL OUTPT CLINIC VISIT: HCPCS | Performed by: INTERNAL MEDICINE

## 2022-04-06 RX ORDER — FLECAINIDE ACETATE 50 MG/1
50 TABLET ORAL 2 TIMES DAILY
COMMUNITY
Start: 2022-03-08

## 2022-04-06 NOTE — PROGRESS NOTES
Patient  Helena Romero    Location  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  ER/PA positive at 95%, HER-2 negative (0 by IHC), Ki-67 approximately 5%.  -Consult slide review at [facility] on 9/17/2020 showed essentially identical results.  -11/5/2020: Lumpectomy with sentinel lymph node biopsy on the left.  There is a 12 mm area of invasive mucinous carcinoma and a 6 mm area of low-grade DCIS.  Margins negative.  -Completed radiation therapy (Dr. Goldman)  [facility] negative per patient report.  -Anastrozole started 12/3/2020 - stopped due to leg swelling  --switched to exemestane in August 2021      Malignant neoplasm of lower-outer quadrant of left breast of female, estrogen receptor positive (HCC)   9/30/2020 Initial Diagnosis    Malignant neoplasm of lower-outer quadrant of left breast of female, estrogen receptor positive (CMS/HCC)         History of Present Illness  Patient comes in today for follow-up of patient's breast cancer.  She is currently on exemestane and is tolerating it well.  Her last mammogram was in July of this year and was followed by Dr. Jones.  Patient reports that it was normal.  She wanted to come in today to discuss her positive Cologuard test.  This was done by her primary care provider.  She already has an appointment with gastroenterology for endoscopy.  Since getting the positive Cologuard test she has had tremendous anxiety.  She is worried about the possibility of a second cancer.  I read the results of  the test and reassured the patient that only 4% of people with positive Cologuard test were found to have malignancy at the time.  There is a nearly 50% chance that there are no abnormal findings.    Review of Systems   Constitutional: Negative for appetite change, diaphoresis, fatigue, fever, unexpected weight gain and unexpected weight loss.   HENT: Negative for hearing loss, mouth sores, sore throat, swollen glands, trouble swallowing and voice change.    Eyes: Negative for blurred vision.   Respiratory: Negative for cough, shortness of breath and wheezing.    Cardiovascular: Negative for chest pain and palpitations.   Gastrointestinal: Negative for abdominal pain, blood in stool, constipation, diarrhea, nausea and vomiting.   Endocrine: Negative for cold intolerance and heat intolerance.   Genitourinary: Negative for difficulty urinating, dysuria, frequency, hematuria and urinary incontinence.   Musculoskeletal: Positive for back pain. Negative for arthralgias and myalgias.   Skin: Negative for rash, skin lesions and wound.   Neurological: Negative for dizziness, seizures, weakness, numbness and headache.   Hematological: Does not bruise/bleed easily.   Psychiatric/Behavioral: Negative for depressed mood. The patient is not nervous/anxious.        Past Medical History:   Diagnosis Date   • Acute bronchitis    • Allergic    • Anesthesia complication     PATIENT STAYS SLEEPY A LONGTIME AFTER SURGERY   • Anxiety    • Asthma    • Atrial fibrillation (HCC)    • Borderline hyperglycemia    • Breast cancer (HCC)    • Cat bite    • Depression    • Diarrhea    • GERD (gastroesophageal reflux disease)    • Hypertension    • Hypocalcemia    • Insomnia    • Low back pain    • Low back pain, episodic    • Need for diphtheria-tetanus-pertussis (Tdap) vaccine    • Neuromuscular disorder (HCC)    • Obesity    • Osteoarthritis, generalized    • Panic attack    • Pneumonia     x 3   • Seasonal allergies    • Sleep disturbances     • Trochanteric bursitis    • Vaginal candidiasis    • Yeast infection      Past Surgical History:   Procedure Laterality Date   • BREAST BIOPSY Left 2020    MALIGNANT   • BREAST LUMPECTOMY WITH SENTINEL NODE BIOPSY Left 11/5/2020    Procedure: Left breast charley localized lumpectomy and left axillary sentinel lymph node biopsy;  Surgeon: Inés Jones MD;  Location: [facility] MAIN OR;  Service: General;  Laterality: Left;   • CERVICAL CONIZATION, LEEP     • CHOLECYSTECTOMY     • COLONOSCOPY  approx 2011    normal per patient-repeat 10 years   • DILATION AND CURETTAGE, DIAGNOSTIC / THERAPEUTIC     • TONSILLECTOMY     • TUBAL ABDOMINAL LIGATION       Social History     Socioeconomic History   • Marital status:    Tobacco Use   • Smoking status: Never Smoker   • Smokeless tobacco: Never Used   Vaping Use   • Vaping Use: Never used   Substance and Sexual Activity   • Alcohol use: No   • Drug use: No   • Sexual activity: Defer     Family History   Problem Relation Age of Onset   • Pancreatic cancer Paternal Aunt    • Cervical cancer Mother 41   • Melanoma Sister 60   • Thyroid cancer Sister 60   • Breast cancer Niece 38        TRIPLE -   • Malig Hyperthermia Neg Hx        Objective   Physical Exam  General: Alert, cooperative, no acute distress  Eyes: Anicteric sclera, PERRLA  Respiratory: normal respiratory effort  Cardiovascular: no lower extremity edema  Skin: Normal tone, no rash, no lesions  Psychiatric: Appropriate affect, intact judgment  Neurologic: No focal sensory or motor deficits, normal cognition   Musculoskeletal: Normal muscle strength and tone  Extremities: No clubbing, cyanosis, or deformities    Vitals:    04/06/22 1315   BP: 168/67   Pulse: 64   Resp: 20   Temp: 98.5 °F (36.9 °C)   SpO2: 99%   Weight: 122 kg (268 lb 15.4 oz)   PainSc: 0-No pain     ECOG score: 2         PHQ-9 Total Score:         Result Review :   The following data was reviewed by: Ludy Cartwright MD PhD on  04/06/2022:  Lab Results   Component Value Date    HGB 13.0 12/14/2021    HCT 39.4 12/14/2021    MCV 88.9 12/14/2021     12/14/2021    WBC 7.74 12/14/2021    NEUTROABS 5.46 12/14/2021    LYMPHSABS 1.53 12/14/2021    MONOSABS 0.52 12/14/2021    EOSABS 0.19 12/14/2021    BASOSABS 0.02 12/14/2021     Lab Results   Component Value Date    GLUCOSE 97 01/03/2022    BUN 12 01/03/2022    CREATININE 0.93 01/03/2022     01/03/2022    K 4.3 01/03/2022     01/03/2022    CO2 23 01/03/2022    CALCIUM 9.1 01/03/2022    PROTEINTOT 7.3 12/14/2021    ALBUMIN 4.60 12/14/2021    BILITOT 0.4 12/14/2021    ALKPHOS 116 12/14/2021    AST 21 12/14/2021    ALT 18 12/14/2021          Assessment and Plan    Diagnoses and all orders for this visit:    1. Malignant neoplasm of lower-outer quadrant of left breast of female, estrogen receptor positive (HCC) (Primary)    2. Positive colorectal cancer screening using Cologuard test    3. Encounter for screening mammogram for malignant neoplasm of breast  -     Mammo screening digital tomosynthesis bilateral w CAD; Future      Mucinous breast cancer: Patient is currently tolerating exemestane well.  Her next screening mammogram is due in July of this year.  She is also due for repeat DEXA scan in December.  I will order these tests and plan to follow-up with the patient after her mammogram in July.    Abnormal Cologuard test: I encouraged the patient to keep her appointment with gastroenterology for colonoscopy.  However, only 4% of Cologuard test result in a cancer diagnosis.  Patient was reassured by this finding.  If there are concerns and the patient is instructed to follow-up with oncology, she will contact me sooner.    Patient was given instructions and counseling regarding her condition or for health maintenance advice. Please see specific information pulled into the AVS if appropriate.     Ludy Cartwright MD PhD    4/14/2022

## 2022-04-06 NOTE — TELEPHONE ENCOUNTER
POSITIVE COLOGUARD          Last scope 13yrs ( no records)--No personal history polyps--No family history of polyps or colon cancer--XARELTO--Medications:          Acetaminophen (TYLENOL 8 HOUR ARTHRITIS PAIN PO)  atorvastatin (LIPITOR) 20 MG tablet  busPIRone (BUSPAR) 15 MG tablet  dicyclomine (BENTYL) 20 MG tablet  diphenhydrAMINE (BENADRYL) 25 mg capsule  exemestane (AROMASIN) 25 MG chemo tablet  fexofenadine (ALLEGRA) 180 MG tablet  flecainide (TAMBOCOR) 50 MG tablet  gabapentin (NEURONTIN) 100 MG capsule  guaiFENesin (Mucinex) 600 MG 12 hr tablet  hydroCHLOROthiazide (HYDRODIURIL) 12.5 MG tablet ()  metoprolol succinate XL (TOPROL-XL) 25 MG 24 hr tablet  olmesartan (BENICAR) 40 MG tablet  omeprazole (priLOSEC) 20 MG capsule  ondansetron ODT (ZOFRAN-ODT) 4 MG disintegrating tablet  polyethylene glycol (MiraLax) 17 GM/SCOOP powder  rivaroxaban (XARELTO) 20 MG tablet           Pt verbalized and understood that it would be few weeks before been schedule

## 2022-04-10 DIAGNOSIS — R19.5 POSITIVE COLORECTAL CANCER SCREENING USING COLOGUARD TEST: Primary | ICD-10-CM

## 2022-04-10 RX ORDER — SODIUM CHLORIDE, SODIUM LACTATE, POTASSIUM CHLORIDE, CALCIUM CHLORIDE 600; 310; 30; 20 MG/100ML; MG/100ML; MG/100ML; MG/100ML
30 INJECTION, SOLUTION INTRAVENOUS CONTINUOUS
Status: CANCELLED | OUTPATIENT
Start: 2022-06-17

## 2022-04-10 NOTE — TELEPHONE ENCOUNTER
Case request entered.  She is on Xarelto for A. fib, her cardiologist is Dr. Prince.  Can you please get clearance for her to hold the Xarelto 48 hours prior to the procedure notify the patient, thanks

## 2022-04-12 DIAGNOSIS — C50.512 MALIGNANT NEOPLASM OF LOWER-OUTER QUADRANT OF LEFT BREAST OF FEMALE, ESTROGEN RECEPTOR POSITIVE: ICD-10-CM

## 2022-04-12 DIAGNOSIS — Z17.0 MALIGNANT NEOPLASM OF LOWER-OUTER QUADRANT OF LEFT BREAST OF FEMALE, ESTROGEN RECEPTOR POSITIVE: ICD-10-CM

## 2022-04-12 PROBLEM — R19.5 POSITIVE COLORECTAL CANCER SCREENING USING COLOGUARD TEST: Status: ACTIVE | Noted: 2022-04-12

## 2022-04-12 RX ORDER — EXEMESTANE 25 MG/1
25 TABLET ORAL DAILY
Qty: 90 TABLET | Refills: 1 | Status: SHIPPED | OUTPATIENT
Start: 2022-04-12 | End: 2022-08-01 | Stop reason: SDUPTHER

## 2022-04-12 NOTE — TELEPHONE ENCOUNTER
Spoke with pt scheduled at [facility] on ---JUNE 17----arrive at ---1230 PM-----shantanu---OVIDIO-----prachi instructional packet

## 2022-05-24 ENCOUNTER — CLINICAL SUPPORT (OUTPATIENT)
Dept: INTERNAL MEDICINE | Facility: CLINIC | Age: 72
End: 2022-05-24

## 2022-05-24 DIAGNOSIS — Z23 COVID-19 VACCINE ADMINISTERED: Primary | ICD-10-CM

## 2022-05-24 PROCEDURE — 91305 COVID-19 (PFIZER) 12+ YRS: CPT | Performed by: INTERNAL MEDICINE

## 2022-05-24 PROCEDURE — 0051A COVID-19 (PFIZER) 12+ YRS: CPT | Performed by: INTERNAL MEDICINE

## 2022-05-31 RX ORDER — OMEPRAZOLE 20 MG/1
20 CAPSULE, DELAYED RELEASE ORAL DAILY
Qty: 90 CAPSULE | Refills: 1 | Status: SHIPPED | OUTPATIENT
Start: 2022-05-31 | End: 2022-11-30

## 2022-06-06 RX ORDER — BUSPIRONE HYDROCHLORIDE 15 MG/1
TABLET ORAL
Qty: 270 TABLET | Refills: 1 | Status: SHIPPED | OUTPATIENT
Start: 2022-06-06

## 2022-06-17 ENCOUNTER — HOSPITAL ENCOUNTER (OUTPATIENT)
Facility: HOSPITAL | Age: 72
Setting detail: HOSPITAL OUTPATIENT SURGERY
Discharge: HOME | End: 2022-06-17
Attending: INTERNAL MEDICINE | Admitting: INTERNAL MEDICINE

## 2022-06-17 ENCOUNTER — ANESTHESIA (OUTPATIENT)
Dept: GASTROENTEROLOGY | Facility: HOSPITAL | Age: 72
End: 2022-06-17

## 2022-06-17 ENCOUNTER — ANESTHESIA EVENT (OUTPATIENT)
Dept: GASTROENTEROLOGY | Facility: HOSPITAL | Age: 72
End: 2022-06-17

## 2022-06-17 VITALS
HEART RATE: 76 BPM | WEIGHT: 260 LBS | HEIGHT: 64 IN | OXYGEN SATURATION: 94 % | BODY MASS INDEX: 44.39 KG/M2 | SYSTOLIC BLOOD PRESSURE: 124 MMHG | DIASTOLIC BLOOD PRESSURE: 72 MMHG | RESPIRATION RATE: 16 BRPM

## 2022-06-17 DIAGNOSIS — R19.5 POSITIVE COLORECTAL CANCER SCREENING USING COLOGUARD TEST: ICD-10-CM

## 2022-06-17 PROCEDURE — 45381 COLONOSCOPY SUBMUCOUS NJX: CPT | Performed by: INTERNAL MEDICINE

## 2022-06-17 PROCEDURE — 45380 COLONOSCOPY AND BIOPSY: CPT | Performed by: INTERNAL MEDICINE

## 2022-06-17 PROCEDURE — 45385 COLONOSCOPY W/LESION REMOVAL: CPT | Performed by: INTERNAL MEDICINE

## 2022-06-17 PROCEDURE — 88305 TISSUE EXAM BY PATHOLOGIST: CPT | Performed by: INTERNAL MEDICINE

## 2022-06-17 PROCEDURE — 25010000002 PROPOFOL 10 MG/ML EMULSION: Performed by: ANESTHESIOLOGY

## 2022-06-17 PROCEDURE — S0260 H&P FOR SURGERY: HCPCS | Performed by: INTERNAL MEDICINE

## 2022-06-17 RX ORDER — LIDOCAINE HYDROCHLORIDE 10 MG/ML
0.5 INJECTION, SOLUTION INFILTRATION; PERINEURAL ONCE AS NEEDED
Status: DISCONTINUED | OUTPATIENT
Start: 2022-06-17 | End: 2022-06-17 | Stop reason: HOSPADM

## 2022-06-17 RX ORDER — LIDOCAINE HYDROCHLORIDE 20 MG/ML
INJECTION, SOLUTION INFILTRATION; PERINEURAL AS NEEDED
Status: DISCONTINUED | OUTPATIENT
Start: 2022-06-17 | End: 2022-06-17 | Stop reason: SURG

## 2022-06-17 RX ORDER — SODIUM CHLORIDE, SODIUM LACTATE, POTASSIUM CHLORIDE, CALCIUM CHLORIDE 600; 310; 30; 20 MG/100ML; MG/100ML; MG/100ML; MG/100ML
1000 INJECTION, SOLUTION INTRAVENOUS CONTINUOUS
Status: DISCONTINUED | OUTPATIENT
Start: 2022-06-17 | End: 2022-06-17 | Stop reason: HOSPADM

## 2022-06-17 RX ORDER — EPHEDRINE SULFATE 50 MG/ML
INJECTION, SOLUTION INTRAVENOUS AS NEEDED
Status: DISCONTINUED | OUTPATIENT
Start: 2022-06-17 | End: 2022-06-17 | Stop reason: SURG

## 2022-06-17 RX ORDER — SODIUM CHLORIDE 0.9 % (FLUSH) 0.9 %
10 SYRINGE (ML) INJECTION AS NEEDED
Status: DISCONTINUED | OUTPATIENT
Start: 2022-06-17 | End: 2022-06-17 | Stop reason: HOSPADM

## 2022-06-17 RX ORDER — PROPOFOL 10 MG/ML
VIAL (ML) INTRAVENOUS CONTINUOUS PRN
Status: DISCONTINUED | OUTPATIENT
Start: 2022-06-17 | End: 2022-06-17 | Stop reason: SURG

## 2022-06-17 RX ORDER — SODIUM CHLORIDE, SODIUM LACTATE, POTASSIUM CHLORIDE, CALCIUM CHLORIDE 600; 310; 30; 20 MG/100ML; MG/100ML; MG/100ML; MG/100ML
30 INJECTION, SOLUTION INTRAVENOUS CONTINUOUS
Status: DISCONTINUED | OUTPATIENT
Start: 2022-06-17 | End: 2022-06-17 | Stop reason: HOSPADM

## 2022-06-17 RX ORDER — PROPOFOL 10 MG/ML
VIAL (ML) INTRAVENOUS AS NEEDED
Status: DISCONTINUED | OUTPATIENT
Start: 2022-06-17 | End: 2022-06-17 | Stop reason: SURG

## 2022-06-17 RX ADMIN — PROPOFOL 150 MG: 10 INJECTION, EMULSION INTRAVENOUS at 14:07

## 2022-06-17 RX ADMIN — LIDOCAINE HYDROCHLORIDE 60 MG: 20 INJECTION, SOLUTION INFILTRATION; PERINEURAL at 14:07

## 2022-06-17 RX ADMIN — SODIUM CHLORIDE, POTASSIUM CHLORIDE, SODIUM LACTATE AND CALCIUM CHLORIDE 30 ML/HR: 600; 310; 30; 20 INJECTION, SOLUTION INTRAVENOUS at 12:05

## 2022-06-17 RX ADMIN — Medication 200 MCG/KG/MIN: at 14:07

## 2022-06-17 RX ADMIN — EPHEDRINE SULFATE 10 MG: 50 INJECTION INTRAVENOUS at 14:30

## 2022-06-17 NOTE — ANESTHESIA PREPROCEDURE EVALUATION
Anesthesia Evaluation     Patient summary reviewed and Nursing notes reviewed                Airway   Mallampati: III  TM distance: >3 FB  Neck ROM: full  Possible difficult intubation  Dental - normal exam     Pulmonary - normal exam   (+) asthma,    ROS comment: Current nasal congestion felt due to sinusitis  Cardiovascular - normal exam    ECG reviewed  Rhythm: regular  Rate: normal    (+) hypertension 2 medications or greater, valvular problems/murmurs MR, dysrhythmias Paroxysmal Atrial Fib, hyperlipidemia,       Neuro/Psych  (+) numbness, psychiatric history Anxiety and Depression,      ROS Comment: Panic attacks  GI/Hepatic/Renal/Endo    (+) obesity, morbid obesity, GERD,      Musculoskeletal     (+) back pain,   Abdominal   (+) obese,    Substance History      OB/GYN          Other   arthritis,    history of cancer                  Anesthesia Plan    ASA 3     MAC     (POM mask due to nasal congestion)  intravenous induction     Anesthetic plan, risks, benefits, and alternatives have been provided, discussed and informed consent has been obtained with: patient.        CODE STATUS:

## 2022-06-17 NOTE — ANESTHESIA POSTPROCEDURE EVALUATION
Patient: Helena Romero    Procedure Summary     Date: 06/17/22 Room / Location:  [facility]    Anesthesia Start: 1403 Anesthesia Stop: 1452    Procedure: COLONOSCOPY INTO CECUM AND T.I. WITH COLD SNARE AND COLD BIOPSY POLYPECTOMIES AND 2CC TATTOO PLACEMENT (N/A ) Diagnosis:       Positive colorectal cancer screening using Cologuard test      (Positive colorectal cancer screening using Cologuard test [R19.5])    Surgeons: Marlene Amin MD Provider: Faraz Turk MD    Anesthesia Type: MAC ASA Status: 3          Anesthesia Type: MAC    Vitals  No vitals data found for the desired time range.          Post Anesthesia Care and Evaluation    Patient location during evaluation: PHASE II  Patient participation: complete - patient participated  Level of consciousness: awake and alert  Pain management: adequate    Airway patency: patent  Anesthetic complications: No anesthetic complications  PONV Status: none  Cardiovascular status: acceptable and hemodynamically stable  Respiratory status: acceptable, nonlabored ventilation and spontaneous ventilation  Hydration status: acceptable

## 2022-06-17 NOTE — DISCHARGE INSTRUCTIONS

## 2022-06-17 NOTE — H&P
[facility]  Pre Procedure History & Physical    Chief Complaint: Positive Cologuard test      HPI: 72-year-old woman with a past medical history as below here for colonoscopy following positive Cologuard testing results.  She is on Xarelto but has been cleared to hold this by her cardiologist.  She otherwise feels well.    Past Medical History:   Past Medical History:   Diagnosis Date   • Acute bronchitis    • Allergic    • Anesthesia complication     PATIENT STAYS SLEEPY A LONGTIME AFTER SURGERY   • Anxiety    • Asthma    • Atrial fibrillation (HCC)    • Borderline hyperglycemia    • Breast cancer (HCC)    • Cat bite    • Depression    • Diarrhea    • GERD (gastroesophageal reflux disease)    • Hypertension    • Hypocalcemia    • Insomnia    • Low back pain    • Low back pain, episodic    • Need for diphtheria-tetanus-pertussis (Tdap) vaccine    • Neuromuscular disorder (HCC)    • Obesity    • Osteoarthritis, generalized    • Panic attack    • Pneumonia     x 3   • Seasonal allergies    • Sleep disturbances    • Trochanteric bursitis    • Vaginal candidiasis    • Yeast infection        Family History:  Family History   Problem Relation Age of Onset   • Pancreatic cancer Paternal Aunt    • Cervical cancer Mother 41   • Melanoma Sister 60   • Thyroid cancer Sister 60   • Breast cancer Niece 38        TRIPLE -   • Malig Hyperthermia Neg Hx        Social History:   reports that she has never smoked. She has never used smokeless tobacco. She reports that she does not drink alcohol and does not use drugs.    Medications:   No medications prior to admission.       Allergies:  Codeine and Lisinopril    ROS:    Pertinent items are noted in HPI     Objective     not currently breastfeeding.    Physical Exam   Constitutional: Pt is oriented to person, place, and time and well-developed, well-nourished, and in no distress.   HENT:   Mouth/Throat: Oropharynx is clear and moist.   Neck: Normal range of  motion. Neck supple.   Cardiovascular: Normal rate, regular rhythm and normal heart sounds.    Pulmonary/Chest: Effort normal and breath sounds normal. No respiratory distress. No  wheezes.   Abdominal: Soft. Bowel sounds are normal.   Skin: Skin is warm and dry.   Psychiatric: Mood, memory, affect and judgment normal.     Assessment & Plan     Diagnosis:Positive Cologuard test      Anticipated Surgical Procedure:    Colonoscopy    The risks, benefits, and alternatives of this procedure have been discussed with the patient or the responsible party- the patient understands and agrees to proceed.

## 2022-06-20 ENCOUNTER — TELEPHONE (OUTPATIENT)
Dept: GASTROENTEROLOGY | Facility: CLINIC | Age: 72
End: 2022-06-20

## 2022-06-20 LAB
LAB AP CASE REPORT: NORMAL
PATH REPORT.FINAL DX SPEC: NORMAL
PATH REPORT.GROSS SPEC: NORMAL

## 2022-06-20 NOTE — TELEPHONE ENCOUNTER
----- Message from Marlene Amin MD sent at 6/17/2022  3:11 PM EDT -----  Pls refer him to Dr Warren Valladares at [facility] for endoscopic mucosal resection of a transverse colon polyp.  Site has been tattooed and she is on xarelto.  Thanks!

## 2022-06-23 NOTE — PROGRESS NOTES
Her colon polyps included 1 sessile serrated adenoma, 1 tubular adenoma    Can you please check on the referral to  at [facility] for removal of the transverse colon polyp, thanks

## 2022-06-24 ENCOUNTER — TELEPHONE (OUTPATIENT)
Dept: GASTROENTEROLOGY | Facility: CLINIC | Age: 72
End: 2022-06-24

## 2022-06-24 NOTE — TELEPHONE ENCOUNTER
----- Message from Marlene Amin MD sent at 6/23/2022  1:52 PM EDT -----  Her colon polyps included 1 sessile serrated adenoma, 1 tubular adenoma    Can you please check on the referral to  at [facility] for removal of the transverse colon polyp, thanks

## 2022-06-24 NOTE — TELEPHONE ENCOUNTER
Patient notified of results and recommendations and verbalized understanding    Message forwarded to scheduling department to set up referral to DR Valladares

## 2022-07-01 ENCOUNTER — OFFICE VISIT (OUTPATIENT)
Dept: INTERNAL MEDICINE | Facility: CLINIC | Age: 72
End: 2022-07-01

## 2022-07-01 VITALS
DIASTOLIC BLOOD PRESSURE: 80 MMHG | SYSTOLIC BLOOD PRESSURE: 140 MMHG | TEMPERATURE: 97.5 F | HEIGHT: 64 IN | BODY MASS INDEX: 45.41 KG/M2 | WEIGHT: 266 LBS

## 2022-07-01 DIAGNOSIS — R11.0 NAUSEA: Primary | ICD-10-CM

## 2022-07-01 DIAGNOSIS — R53.1 WEAKNESS: ICD-10-CM

## 2022-07-01 LAB
BILIRUB BLD-MCNC: NEGATIVE MG/DL
CLARITY, POC: CLEAR
COLOR UR: YELLOW
EXPIRATION DATE: NORMAL
GLUCOSE UR STRIP-MCNC: NEGATIVE MG/DL
KETONES UR QL: NEGATIVE
LEUKOCYTE EST, POC: NEGATIVE
Lab: NORMAL
NITRITE UR-MCNC: NEGATIVE MG/ML
PH UR: 6 [PH] (ref 5–8)
PROT UR STRIP-MCNC: NEGATIVE MG/DL
RBC # UR STRIP: NEGATIVE /UL
SP GR UR: 1.01 (ref 1–1.03)
UROBILINOGEN UR QL: NORMAL

## 2022-07-01 PROCEDURE — 81003 URINALYSIS AUTO W/O SCOPE: CPT | Performed by: PHYSICIAN ASSISTANT

## 2022-07-01 PROCEDURE — 99213 OFFICE O/P EST LOW 20 MIN: CPT | Performed by: PHYSICIAN ASSISTANT

## 2022-07-01 NOTE — PROGRESS NOTES
Subjective   Chief Complaint   Patient presents with   • Nausea     Weak-       History of Present Illness     Pt is here today after episode of shaking yesterday afternoon. Was stressed and completing month end charges, started feeling shaking, hands were shaky bilaterally. Was unable to control the mouse to her computer. Had some nausea, thought she was in a fib. Checked EKG on her watch and it showed sinus, her heart rate was normal. She has tachycardia usually with her a fib. She was having difficulty concentrating. Had no slurred speech, double vision, blurred vision, or extremity weakness. No facial drooping. Had only eaten a porkchop and a few lemon heads yesterday. Had been taking mucinex for some head congestion. She ate a few cookies and started to feel better. Overall episode lasted 30 minutes. Had hypoglycemia many years ago, felt very similar. Has had some staggering with walking, going to the right a little. Woke up this AM and had no other sx, just not feeling right. Has not eaten anything at all today.      Patient Active Problem List   Diagnosis   • Essential hypertension   • Other hyperlipidemia   • Mitral insufficiency   • Obesity   • Functional diarrhea   • History of small bowel obstruction   • Gastroesophageal reflux disease   • Paroxysmal atrial fibrillation (HCC)   • Malignant neoplasm of lower-outer quadrant of left breast of female, estrogen receptor positive (HCC)   • Abnormal Pap smear   • Positive colorectal cancer screening using Cologuard test       Allergies   Allergen Reactions   • Codeine Nausea Only   • Lisinopril Hives       Current Outpatient Medications on File Prior to Visit   Medication Sig Dispense Refill   • Acetaminophen (TYLENOL 8 HOUR ARTHRITIS PAIN PO) Take 650 mg by mouth Daily As Needed.     • atorvastatin (LIPITOR) 20 MG tablet TAKE 1 TABLET BY MOUTH DAILY 90 tablet 1   • busPIRone (BUSPAR) 15 MG tablet TAKE 1 TABLET BY MOUTH THREE TIMES DAILY 270 tablet 1   •  dicyclomine (BENTYL) 20 MG tablet Take 1 tablet by mouth Every 6 (Six) Hours. 20 tablet 0   • diphenhydrAMINE (BENADRYL) 25 mg capsule Take 25 mg by mouth At Night As Needed for Sleep.     • exemestane (AROMASIN) 25 MG chemo tablet Take 1 tablet by mouth Daily. 90 tablet 1   • fexofenadine (ALLEGRA) 180 MG tablet Take 180 mg by mouth Daily.     • flecainide (TAMBOCOR) 50 MG tablet Take 50 mg by mouth 2 (Two) Times a Day.     • gabapentin (NEURONTIN) 100 MG capsule TAKE 3 CAPSULES BY MOUTH EVERY NIGHT 90 capsule 5   • guaiFENesin (Mucinex) 600 MG 12 hr tablet Take  by mouth.     • hydroCHLOROthiazide (HYDRODIURIL) 12.5 MG tablet TAKE 1 TABLET BY MOUTH DAILY 90 tablet 2   • metoprolol succinate XL (TOPROL-XL) 25 MG 24 hr tablet Take 1 tablet by mouth 2 (two) times a day. (Patient taking differently: Take 50 mg by mouth.) 180 tablet 1   • olmesartan (BENICAR) 40 MG tablet TAKE 1 TABLET BY MOUTH DAILY 90 tablet 1   • omeprazole (priLOSEC) 20 MG capsule TAKE 1 CAPSULE BY MOUTH DAILY 90 capsule 1   • ondansetron ODT (ZOFRAN-ODT) 4 MG disintegrating tablet Place 1 tablet on the tongue 4 (Four) Times a Day As Needed for Nausea or Vomiting. 15 tablet 0   • polyethylene glycol (MiraLax) 17 GM/SCOOP powder Take 17 g by mouth Daily. Take cap of powder with 8oz water daily surrounding surgery and while on narcotic 119 g 0   • rivaroxaban (XARELTO) 20 MG tablet Take 20 mg by mouth Daily.       No current facility-administered medications on file prior to visit.       Past Medical History:   Diagnosis Date   • Acute bronchitis    • Allergic    • Anesthesia complication     PATIENT STAYS SLEEPY A LONGTIME AFTER SURGERY   • Anxiety    • Asthma    • Atrial fibrillation (HCC)    • Borderline hyperglycemia    • Breast cancer (HCC)    • Cat bite    • Depression    • Diarrhea    • GERD (gastroesophageal reflux disease)    • Hypertension    • Hypocalcemia    • Insomnia    • Low back pain    • Low back pain, episodic    • Need for  diphtheria-tetanus-pertussis (Tdap) vaccine    • Neuromuscular disorder (HCC)    • Obesity    • Osteoarthritis, generalized    • Panic attack    • Pneumonia     x 3   • Seasonal allergies    • Sleep disturbances    • Trochanteric bursitis    • Vaginal candidiasis    • Yeast infection        Family History   Problem Relation Age of Onset   • Pancreatic cancer Paternal Aunt    • Cervical cancer Mother 41   • Melanoma Sister 60   • Thyroid cancer Sister 60   • Breast cancer Niece 38        TRIPLE -   • Malig Hyperthermia Neg Hx        Social History     Socioeconomic History   • Marital status:    Tobacco Use   • Smoking status: Never Smoker   • Smokeless tobacco: Never Used   Vaping Use   • Vaping Use: Never used   Substance and Sexual Activity   • Alcohol use: No   • Drug use: No   • Sexual activity: Defer       Past Surgical History:   Procedure Laterality Date   • BREAST BIOPSY Left 2020    MALIGNANT   • BREAST LUMPECTOMY WITH SENTINEL NODE BIOPSY Left 11/5/2020    Procedure: Left breast charley localized lumpectomy and left axillary sentinel lymph node biopsy;  Surgeon: Inés Jones MD;  Location: [facility] MAIN OR;  Service: General;  Laterality: Left;   • CERVICAL CONIZATION, LEEP     • CHOLECYSTECTOMY     • COLONOSCOPY  approx 2011    normal per patient-repeat 10 years   • COLONOSCOPY N/A 6/17/2022    Procedure: COLONOSCOPY INTO CECUM AND T.I. WITH COLD SNARE AND COLD BIOPSY POLYPECTOMIES AND 2CC TATTOO PLACEMENT;  Surgeon: Marlene Amin MD;  Location: [facility] ENDOSCOPY;  Service: Gastroenterology;  Laterality: N/A;  PRE- POSITIVE COLOGUARD  POST- HEMORRHOIDS, DIVERTICULOSIS, POLYPS, TRANSVERSE MULTILOBE FLAT POLYP NOT REMOVED- TATTOO PLACED   • DILATION AND CURETTAGE, DIAGNOSTIC / THERAPEUTIC     • TONSILLECTOMY     • TUBAL ABDOMINAL LIGATION           The following portions of the patient's history were reviewed and updated as appropriate: problem list, allergies, current medications, past medical  "history, past family history, past social history and past surgical history.    ROS     See HPI    Immunization History   Administered Date(s) Administered   • COVID-19 (MODERNA) 1st, 2nd, 3rd Dose Only 01/15/2021, 02/12/2021, 08/28/2021   • Covid-19 (Pfizer) Gray Cap 05/24/2022   • FLUAD TRI 65YR+ 10/01/2019   • Fluad Quad 65+ 10/01/2019   • Fluzone High Dose =>65 Years ([employer] ONLY) 01/01/2016, 11/02/2017, 11/01/2018   • Fluzone High-Dose 65+yrs 11/01/2021   • Pneumococcal Conjugate 13-Valent (PCV13) 11/02/2017   • Pneumococcal Polysaccharide (PPSV23) 01/06/2015   • Tdap 06/26/2017       Objective   Vitals:    07/01/22 1316 07/01/22 1340   BP:  140/80   Temp: 97.5 °F (36.4 °C)    Weight: 121 kg (266 lb)    Height: 162.6 cm (64\")      Body mass index is 45.66 kg/m².  Physical Exam  Vitals reviewed.   Constitutional:       Appearance: Normal appearance.   HENT:      Head: Normocephalic and atraumatic.   Eyes:      Extraocular Movements: Extraocular movements intact.      Conjunctiva/sclera: Conjunctivae normal.      Pupils: Pupils are equal, round, and reactive to light.   Cardiovascular:      Rate and Rhythm: Normal rate and regular rhythm.      Heart sounds: Normal heart sounds.   Pulmonary:      Effort: Pulmonary effort is normal.      Breath sounds: Normal breath sounds.   Musculoskeletal:      Comments: Gait normal   Neurological:      Mental Status: She is alert.           Assessment & Plan   Diagnoses and all orders for this visit:    1. Nausea (Primary)  -     Cancel: Comprehensive Metabolic Panel  -     Cancel: Urinalysis With Culture If Indicated -  -     Comprehensive Metabolic Panel  -     CBC & Differential  -     Urinalysis With Culture If Indicated -  -     POCT urinalysis dipstick, automated    2. Weakness  -     Comprehensive Metabolic Panel  -     CBC & Differential  -     Urinalysis With Culture If Indicated -    Check labs today, increase frequency of meals and make sure eating protein. Call " with worsening sx.     Return for Lab Today.

## 2022-07-02 LAB
ALBUMIN SERPL-MCNC: 4.7 G/DL (ref 3.7–4.7)
ALBUMIN/GLOB SERPL: 2.4 {RATIO} (ref 1.2–2.2)
ALP SERPL-CCNC: 125 IU/L (ref 44–121)
ALT SERPL-CCNC: 22 IU/L (ref 0–32)
AST SERPL-CCNC: 25 IU/L (ref 0–40)
BASOPHILS # BLD AUTO: 0 X10E3/UL (ref 0–0.2)
BASOPHILS NFR BLD AUTO: 1 %
BILIRUB SERPL-MCNC: 0.7 MG/DL (ref 0–1.2)
BUN SERPL-MCNC: 7 MG/DL (ref 8–27)
BUN/CREAT SERPL: 7 (ref 12–28)
CALCIUM SERPL-MCNC: 9.1 MG/DL (ref 8.7–10.3)
CHLORIDE SERPL-SCNC: 100 MMOL/L (ref 96–106)
CO2 SERPL-SCNC: 25 MMOL/L (ref 20–29)
CREAT SERPL-MCNC: 0.96 MG/DL (ref 0.57–1)
EGFRCR SERPLBLD CKD-EPI 2021: 62 ML/MIN/1.73
EOSINOPHIL # BLD AUTO: 0.2 X10E3/UL (ref 0–0.4)
EOSINOPHIL NFR BLD AUTO: 2 %
ERYTHROCYTE [DISTWIDTH] IN BLOOD BY AUTOMATED COUNT: 14.2 % (ref 11.7–15.4)
GLOBULIN SER CALC-MCNC: 2 G/DL (ref 1.5–4.5)
GLUCOSE SERPL-MCNC: 97 MG/DL (ref 65–99)
HCT VFR BLD AUTO: 39 % (ref 34–46.6)
HGB BLD-MCNC: 12.7 G/DL (ref 11.1–15.9)
IMM GRANULOCYTES # BLD AUTO: 0 X10E3/UL (ref 0–0.1)
IMM GRANULOCYTES NFR BLD AUTO: 0 %
LYMPHOCYTES # BLD AUTO: 1.6 X10E3/UL (ref 0.7–3.1)
LYMPHOCYTES NFR BLD AUTO: 19 %
MCH RBC QN AUTO: 28 PG (ref 26.6–33)
MCHC RBC AUTO-ENTMCNC: 32.6 G/DL (ref 31.5–35.7)
MCV RBC AUTO: 86 FL (ref 79–97)
MONOCYTES # BLD AUTO: 0.7 X10E3/UL (ref 0.1–0.9)
MONOCYTES NFR BLD AUTO: 8 %
NEUTROPHILS # BLD AUTO: 5.7 X10E3/UL (ref 1.4–7)
NEUTROPHILS NFR BLD AUTO: 70 %
PLATELET # BLD AUTO: 262 X10E3/UL (ref 150–450)
POTASSIUM SERPL-SCNC: 3.5 MMOL/L (ref 3.5–5.2)
PROT SERPL-MCNC: 6.7 G/DL (ref 6–8.5)
RBC # BLD AUTO: 4.53 X10E6/UL (ref 3.77–5.28)
SODIUM SERPL-SCNC: 142 MMOL/L (ref 134–144)
WBC # BLD AUTO: 8.2 X10E3/UL (ref 3.4–10.8)

## 2022-07-14 ENCOUNTER — TELEPHONE (OUTPATIENT)
Dept: GASTROENTEROLOGY | Facility: CLINIC | Age: 72
End: 2022-07-14

## 2022-07-14 NOTE — TELEPHONE ENCOUNTER
Call to Dr Cele Valladares's office @ [phone] and spoke with Rasta.  Per instructions, demographics, insurance cards, c/s note, cologuard results faxed to [phone].  Confirmation received.

## 2022-07-14 NOTE — TELEPHONE ENCOUNTER
----- Message from Marlene Amin MD sent at 7/14/2022  1:59 PM EDT -----  Can you please check on her referral to Dr.Liu Dipesh for large colonic polyp resection, thanks

## 2022-07-18 DIAGNOSIS — I10 ESSENTIAL HYPERTENSION: ICD-10-CM

## 2022-07-18 RX ORDER — HYDROCHLOROTHIAZIDE 12.5 MG/1
12.5 TABLET ORAL DAILY
Qty: 90 TABLET | Refills: 1 | Status: SHIPPED | OUTPATIENT
Start: 2022-07-18

## 2022-07-18 RX ORDER — HYDROCHLOROTHIAZIDE 12.5 MG/1
12.5 TABLET ORAL DAILY
Qty: 90 TABLET | Refills: 2 | Status: SHIPPED | OUTPATIENT
Start: 2022-07-18 | End: 2022-07-18

## 2022-07-19 NOTE — TELEPHONE ENCOUNTER
Call to DR Valladares's office and spoke with Suly.  Referral in order.  Transferred to Scheduling -  left requesting they contact pt to schedule appt.

## 2022-07-21 NOTE — TELEPHONE ENCOUNTER
Please let her know that she cannot delay getting this colonoscopy.  She has a large polyp that needs to be removed and delaying this can be problematic.    Dr. Valladares is usually able to get folks in pretty quickly, can we please double check on that, thanks

## 2022-07-21 NOTE — TELEPHONE ENCOUNTER
Call to pt.  States had contacted Dr Valladares's office sooner after c/s and was advised that may be several months until appt received.     Reports that currently having issues with BUN/Cr and that PCP following this.  Has been advised that needs to get this straightened out before any surgery.      Pt will contact Dr Valladares's office after upcoming lab work, and will update this office re: f/u plan.     Message to DR Amin.

## 2022-07-22 NOTE — TELEPHONE ENCOUNTER
Call to pt.  Advise per DR Amin note.  Verb understanding.     States did receive call from Dr Valladares's office yesterday.  Is expecting f/u call today with further explanation and  to schedule.   Pt also has contact # @ Dr Valladares's     Update to DR Amin.

## 2022-07-28 RX ORDER — OLMESARTAN MEDOXOMIL 40 MG/1
40 TABLET ORAL DAILY
Qty: 90 TABLET | Refills: 1 | Status: SHIPPED | OUTPATIENT
Start: 2022-07-28 | End: 2023-01-24

## 2022-08-01 ENCOUNTER — OFFICE VISIT (OUTPATIENT)
Dept: ONCOLOGY | Facility: HOSPITAL | Age: 72
End: 2022-08-01

## 2022-08-01 ENCOUNTER — LAB (OUTPATIENT)
Dept: ONCOLOGY | Facility: HOSPITAL | Age: 72
End: 2022-08-01

## 2022-08-01 VITALS
OXYGEN SATURATION: 96 % | RESPIRATION RATE: 16 BRPM | DIASTOLIC BLOOD PRESSURE: 65 MMHG | HEART RATE: 59 BPM | SYSTOLIC BLOOD PRESSURE: 148 MMHG | WEIGHT: 263.23 LBS | TEMPERATURE: 96.7 F | BODY MASS INDEX: 45.18 KG/M2

## 2022-08-01 DIAGNOSIS — Z78.0 POST-MENOPAUSAL: ICD-10-CM

## 2022-08-01 DIAGNOSIS — Z17.0 MALIGNANT NEOPLASM OF LOWER-OUTER QUADRANT OF LEFT BREAST OF FEMALE, ESTROGEN RECEPTOR POSITIVE: Primary | ICD-10-CM

## 2022-08-01 DIAGNOSIS — C50.512 MALIGNANT NEOPLASM OF LOWER-OUTER QUADRANT OF LEFT BREAST OF FEMALE, ESTROGEN RECEPTOR POSITIVE: Primary | ICD-10-CM

## 2022-08-01 DIAGNOSIS — Z17.0 MALIGNANT NEOPLASM OF LOWER-OUTER QUADRANT OF LEFT BREAST OF FEMALE, ESTROGEN RECEPTOR POSITIVE: ICD-10-CM

## 2022-08-01 DIAGNOSIS — C50.512 MALIGNANT NEOPLASM OF LOWER-OUTER QUADRANT OF LEFT BREAST OF FEMALE, ESTROGEN RECEPTOR POSITIVE: ICD-10-CM

## 2022-08-01 LAB
ALBUMIN SERPL-MCNC: 4.52 G/DL (ref 3.5–5.2)
ALBUMIN/GLOB SERPL: 1.7 G/DL
ALP SERPL-CCNC: 127 U/L (ref 39–117)
ALT SERPL W P-5'-P-CCNC: 24 U/L (ref 1–33)
ANION GAP SERPL CALCULATED.3IONS-SCNC: 6.7 MMOL/L (ref 5–15)
AST SERPL-CCNC: 24 U/L (ref 1–32)
BASOPHILS # BLD AUTO: 0.02 10*3/MM3 (ref 0–0.2)
BASOPHILS NFR BLD AUTO: 0.3 % (ref 0–1.5)
BILIRUB SERPL-MCNC: 0.6 MG/DL (ref 0–1.2)
BUN SERPL-MCNC: 8 MG/DL (ref 8–23)
BUN/CREAT SERPL: 8.3 (ref 7–25)
CALCIUM SPEC-SCNC: 9.7 MG/DL (ref 8.6–10.5)
CHLORIDE SERPL-SCNC: 102 MMOL/L (ref 98–107)
CO2 SERPL-SCNC: 31.3 MMOL/L (ref 22–29)
CREAT SERPL-MCNC: 0.96 MG/DL (ref 0.57–1)
DEPRECATED RDW RBC AUTO: 46 FL (ref 37–54)
EGFRCR SERPLBLD CKD-EPI 2021: 62.2 ML/MIN/1.73
EOSINOPHIL # BLD AUTO: 0.15 10*3/MM3 (ref 0–0.4)
EOSINOPHIL NFR BLD AUTO: 2 % (ref 0.3–6.2)
ERYTHROCYTE [DISTWIDTH] IN BLOOD BY AUTOMATED COUNT: 13.9 % (ref 12.3–15.4)
GLOBULIN UR ELPH-MCNC: 2.7 GM/DL
GLUCOSE SERPL-MCNC: 92 MG/DL (ref 65–99)
HCT VFR BLD AUTO: 41.8 % (ref 34–46.6)
HGB BLD-MCNC: 13.1 G/DL (ref 12–15.9)
IMM GRANULOCYTES # BLD AUTO: 0.02 10*3/MM3 (ref 0–0.05)
IMM GRANULOCYTES NFR BLD AUTO: 0.3 % (ref 0–0.5)
LYMPHOCYTES # BLD AUTO: 1.36 10*3/MM3 (ref 0.7–3.1)
LYMPHOCYTES NFR BLD AUTO: 18.1 % (ref 19.6–45.3)
MCH RBC QN AUTO: 27.8 PG (ref 26.6–33)
MCHC RBC AUTO-ENTMCNC: 31.3 G/DL (ref 31.5–35.7)
MCV RBC AUTO: 88.7 FL (ref 79–97)
MONOCYTES # BLD AUTO: 0.64 10*3/MM3 (ref 0.1–0.9)
MONOCYTES NFR BLD AUTO: 8.5 % (ref 5–12)
NEUTROPHILS NFR BLD AUTO: 5.33 10*3/MM3 (ref 1.7–7)
NEUTROPHILS NFR BLD AUTO: 70.8 % (ref 42.7–76)
PLATELET # BLD AUTO: 277 10*3/MM3 (ref 140–450)
PMV BLD AUTO: 10.2 FL (ref 6–12)
POTASSIUM SERPL-SCNC: 3.9 MMOL/L (ref 3.5–5.2)
PROT SERPL-MCNC: 7.2 G/DL (ref 6–8.5)
RBC # BLD AUTO: 4.71 10*6/MM3 (ref 3.77–5.28)
SODIUM SERPL-SCNC: 140 MMOL/L (ref 136–145)
WBC NRBC COR # BLD: 7.52 10*3/MM3 (ref 3.4–10.8)

## 2022-08-01 PROCEDURE — 36415 COLL VENOUS BLD VENIPUNCTURE: CPT

## 2022-08-01 PROCEDURE — 85025 COMPLETE CBC W/AUTO DIFF WBC: CPT

## 2022-08-01 PROCEDURE — 80053 COMPREHEN METABOLIC PANEL: CPT

## 2022-08-01 PROCEDURE — G0463 HOSPITAL OUTPT CLINIC VISIT: HCPCS | Performed by: INTERNAL MEDICINE

## 2022-08-01 PROCEDURE — 99214 OFFICE O/P EST MOD 30 MIN: CPT | Performed by: INTERNAL MEDICINE

## 2022-08-01 RX ORDER — EXEMESTANE 25 MG/1
25 TABLET ORAL DAILY
Qty: 90 TABLET | Refills: 1 | Status: SHIPPED | OUTPATIENT
Start: 2022-08-01 | End: 2022-10-31

## 2022-08-01 RX ORDER — COVID-19 MOLECULAR TEST ASSAY
KIT MISCELLANEOUS
COMMUNITY
Start: 2022-05-07 | End: 2022-10-13

## 2022-08-01 NOTE — PROGRESS NOTES
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  ER/FL positive at 95%, HER-2 negative (0 by IHC), Ki-67 approximately 5%.  -Consult slide review at [facility] on 9/17/2020 showed essentially identical results.  -11/5/2020: Lumpectomy with sentinel lymph node biopsy on the left.  There is a 12 mm area of invasive mucinous carcinoma and a 6 mm area of low-grade DCIS.  Margins negative.  -Completed radiation therapy (Dr. Goldman)  -Breonna genetic testing done at [facility] was negative per patient report.  -Anastrozole started 12/3/2020 - stopped due to leg swelling  --switched to exemestane in August 2021      Malignant neoplasm of lower-outer quadrant of left breast of female, estrogen receptor positive (HCC)   9/30/2020 Initial Diagnosis    Malignant neoplasm of lower-outer quadrant of left breast of female, estrogen receptor positive (CMS/HCC)         HPI  Patient comes in today to get the results of her mammogram.  This was on 7/25/2022 and fortunately was negative for disease recurrence.  She says she is starting to relax little bit and get used to the fact that she is taking medication which will reduce her risk of breast cancer recurrence.  So far she is tolerating exemestane well.    She recently had a colonoscopy which was positive for 3 polyps but no evidence of malignancy.    Review of Systems   Constitutional: Positive for fatigue. Negative for appetite change, diaphoresis, fever, unexpected weight gain and unexpected weight loss.   HENT:  Negative for hearing loss, sore throat and voice change.    Eyes: Negative for blurred vision, double vision, pain, redness and visual disturbance.   Respiratory: Negative for cough, shortness of breath and wheezing.    Cardiovascular: Negative for chest pain, palpitations and leg swelling.   Endocrine: Negative for cold intolerance, heat intolerance, polydipsia and polyuria.   Genitourinary: Negative for decreased urine volume, difficulty urinating, frequency and urinary incontinence.   Musculoskeletal: Negative for arthralgias, back pain, joint swelling and myalgias.   Skin: Negative for color change, rash, skin lesions and wound.   Neurological: Negative for dizziness, seizures, numbness and headache.   Hematological: Negative for adenopathy. Does not bruise/bleed easily.   Psychiatric/Behavioral: Negative for depressed mood. The patient is not nervous/anxious.    All other systems reviewed and are negative.      Past Medical History:   Diagnosis Date   • Acute bronchitis    • Allergic    • Anesthesia complication     PATIENT STAYS SLEEPY A LONGTIME AFTER SURGERY   • Anxiety    • Asthma    • Atrial fibrillation (HCC)    • Borderline hyperglycemia    • Breast cancer (HCC)    • Cat bite    • Depression    • Diarrhea    • GERD (gastroesophageal reflux disease)    • Hypertension    • Hypocalcemia    • Insomnia    • Low back pain    • Low back pain, episodic    • Need for diphtheria-tetanus-pertussis (Tdap) vaccine    • Neuromuscular disorder (HCC)    • Obesity    • Osteoarthritis, generalized    • Panic attack    • Pneumonia     x 3   • Seasonal allergies    • Sleep disturbances    • Trochanteric bursitis    • Vaginal candidiasis    • Yeast infection      Past Surgical History:   Procedure Laterality Date   • BREAST BIOPSY Left 2020    MALIGNANT   • BREAST LUMPECTOMY WITH SENTINEL NODE BIOPSY Left 11/5/2020    Procedure: Left breast charley localized lumpectomy and left axillary sentinel lymph node biopsy;  Surgeon:  Inés Jones MD;  Location: [facility] MAIN OR;  Service: General;  Laterality: Left;   • CERVICAL CONIZATION, LEEP     • CHOLECYSTECTOMY     • COLONOSCOPY  approx 2011    normal per patient-repeat 10 years   • COLONOSCOPY N/A 6/17/2022    Procedure: COLONOSCOPY INTO CECUM AND T.I. WITH COLD SNARE AND COLD BIOPSY POLYPECTOMIES AND 2CC TATTOO PLACEMENT;  Surgeon: Marlene Amin MD;  Location: [facility] ENDOSCOPY;  Service: Gastroenterology;  Laterality: N/A;  PRE- POSITIVE COLOGUARD  POST- HEMORRHOIDS, DIVERTICULOSIS, POLYPS, TRANSVERSE MULTILOBE FLAT POLYP NOT REMOVED- TATTOO PLACED   • DILATION AND CURETTAGE, DIAGNOSTIC / THERAPEUTIC     • TONSILLECTOMY     • TUBAL ABDOMINAL LIGATION       Social History     Socioeconomic History   • Marital status:    Tobacco Use   • Smoking status: Never Smoker   • Smokeless tobacco: Never Used   Vaping Use   • Vaping Use: Never used   Substance and Sexual Activity   • Alcohol use: No   • Drug use: No   • Sexual activity: Defer     Family History   Problem Relation Age of Onset   • Pancreatic cancer Paternal Aunt    • Cervical cancer Mother 41   • Melanoma Sister 60   • Thyroid cancer Sister 60   • Breast cancer Niece 38        TRIPLE -   • Malig Hyperthermia Neg Hx        Objective   Physical Exam  General: Alert, cooperative, no acute distress  Eyes: Anicteric sclera, PERRLA  Respiratory: normal respiratory effort  Cardiovascular: no lower extremity edema  Skin: Normal tone, no rash, no lesions  Psychiatric: Appropriate affect, intact judgment  Neurologic: No focal sensory or motor deficits, normal cognition   Musculoskeletal: Normal muscle strength and tone  Extremities: No clubbing, cyanosis, or deformities    Vitals:    08/01/22 1057   BP: 148/65   Pulse: 59   Resp: 16   Temp: 96.7 °F (35.9 °C)   SpO2: 96%   Weight: 119 kg (263 lb 3.7 oz)   PainSc: 0-No pain     ECOG score: 0         PHQ-9 Total Score:         Result Review :   The following data was reviewed by:  Ludy Cartwright MD PhD on 08/01/2022:  Lab Results   Component Value Date    HGB 13.1 08/01/2022    HCT 41.8 08/01/2022    MCV 88.7 08/01/2022     08/01/2022    WBC 7.52 08/01/2022    NEUTROABS 5.33 08/01/2022    LYMPHSABS 1.36 08/01/2022    MONOSABS 0.64 08/01/2022    EOSABS 0.15 08/01/2022    BASOSABS 0.02 08/01/2022     Lab Results   Component Value Date    GLUCOSE 92 08/01/2022    BUN 8 08/01/2022    CREATININE 0.96 08/01/2022     08/01/2022    K 3.9 08/01/2022     08/01/2022    CO2 31.3 (H) 08/01/2022    CALCIUM 9.7 08/01/2022    PROTEINTOT 7.2 08/01/2022    ALBUMIN 4.52 08/01/2022    BILITOT 0.6 08/01/2022    ALKPHOS 127 (H) 08/01/2022    AST 24 08/01/2022    ALT 24 08/01/2022          Assessment and Plan    Diagnoses and all orders for this visit:    1. Malignant neoplasm of lower-outer quadrant of left breast of female, estrogen receptor positive (HCC) (Primary)  -     exemestane (AROMASIN) 25 MG chemo tablet; Take 1 tablet by mouth Daily.  Dispense: 90 tablet; Refill: 1  -     CBC & Differential; Future  -     Comprehensive Metabolic Panel; Future    2. Post-menopausal  -     DEXA Bone Density Axial; Future      Mucinous breast cancer: Patient is currently tolerating exemestane well.  I will send a refill for 90-day supply.  Screening mammogram was negative.  She is also due for repeat DEXA scan in December.  I will follow-up with her in January.    Abnormal Cologuard test: Colonoscopy found on the colon polyps.  She will continue monitoring per the recommendations of her primary care provider and gastroenterology.    Patient was given instructions and counseling regarding her condition or for health maintenance advice. Please see specific information pulled into the AVS if appropriate.

## 2022-08-06 DIAGNOSIS — M19.91 PRIMARY OSTEOARTHRITIS, UNSPECIFIED SITE: ICD-10-CM

## 2022-08-08 RX ORDER — GABAPENTIN 100 MG/1
300 CAPSULE ORAL NIGHTLY
Qty: 90 CAPSULE | Refills: 1 | Status: SHIPPED | OUTPATIENT
Start: 2022-08-08 | End: 2022-12-12

## 2022-08-25 RX ORDER — FLUCONAZOLE 100 MG/1
100 TABLET ORAL DAILY
Qty: 7 TABLET | Refills: 0 | Status: SHIPPED | OUTPATIENT
Start: 2022-08-25

## 2022-08-31 RX ORDER — ATORVASTATIN CALCIUM 20 MG/1
20 TABLET, FILM COATED ORAL DAILY
Qty: 90 TABLET | Refills: 1 | Status: SHIPPED | OUTPATIENT
Start: 2022-08-31 | End: 2023-02-28

## 2022-09-13 ENCOUNTER — HOSPITAL ENCOUNTER (EMERGENCY)
Facility: HOSPITAL | Age: 72
Discharge: HOME | End: 2022-09-13
Attending: EMERGENCY MEDICINE | Admitting: EMERGENCY MEDICINE

## 2022-09-13 VITALS
WEIGHT: 258.82 LBS | HEART RATE: 88 BPM | HEIGHT: 64 IN | OXYGEN SATURATION: 96 % | RESPIRATION RATE: 16 BRPM | BODY MASS INDEX: 44.19 KG/M2 | SYSTOLIC BLOOD PRESSURE: 148 MMHG | DIASTOLIC BLOOD PRESSURE: 80 MMHG | TEMPERATURE: 97.8 F

## 2022-09-13 DIAGNOSIS — R04.0 EPISTAXIS: Primary | ICD-10-CM

## 2022-09-13 LAB
ALBUMIN SERPL-MCNC: 4.6 G/DL (ref 3.5–5.2)
ALBUMIN/GLOB SERPL: 1.5 G/DL
ALP SERPL-CCNC: 133 U/L (ref 39–117)
ALT SERPL W P-5'-P-CCNC: 20 U/L (ref 1–33)
ANION GAP SERPL CALCULATED.3IONS-SCNC: 10 MMOL/L (ref 5–15)
APTT PPP: 41.5 SECONDS (ref 24.2–34.2)
AST SERPL-CCNC: 21 U/L (ref 1–32)
BASOPHILS # BLD AUTO: 0.05 10*3/MM3 (ref 0–0.2)
BASOPHILS NFR BLD AUTO: 0.6 % (ref 0–1.5)
BILIRUB SERPL-MCNC: 0.4 MG/DL (ref 0–1.2)
BUN SERPL-MCNC: 7 MG/DL (ref 8–23)
BUN/CREAT SERPL: 8 (ref 7–25)
CALCIUM SPEC-SCNC: 9.5 MG/DL (ref 8.6–10.5)
CHLORIDE SERPL-SCNC: 105 MMOL/L (ref 98–107)
CO2 SERPL-SCNC: 27 MMOL/L (ref 22–29)
CREAT SERPL-MCNC: 0.87 MG/DL (ref 0.57–1)
DEPRECATED RDW RBC AUTO: 44.3 FL (ref 37–54)
EGFRCR SERPLBLD CKD-EPI 2021: 70 ML/MIN/1.73
EOSINOPHIL # BLD AUTO: 0.17 10*3/MM3 (ref 0–0.4)
EOSINOPHIL NFR BLD AUTO: 2.2 % (ref 0.3–6.2)
ERYTHROCYTE [DISTWIDTH] IN BLOOD BY AUTOMATED COUNT: 13.8 % (ref 12.3–15.4)
GLOBULIN UR ELPH-MCNC: 3 GM/DL
GLUCOSE SERPL-MCNC: 129 MG/DL (ref 65–99)
HCT VFR BLD AUTO: 41.1 % (ref 34–46.6)
HGB BLD-MCNC: 13.1 G/DL (ref 12–15.9)
HOLD SPECIMEN: 11
HOLD SPECIMEN: 11
IMM GRANULOCYTES # BLD AUTO: 0.02 10*3/MM3 (ref 0–0.05)
IMM GRANULOCYTES NFR BLD AUTO: 0.3 % (ref 0–0.5)
INR PPP: 1.5 (ref 0.86–1.15)
LYMPHOCYTES # BLD AUTO: 1.16 10*3/MM3 (ref 0.7–3.1)
LYMPHOCYTES NFR BLD AUTO: 15 % (ref 19.6–45.3)
MCH RBC QN AUTO: 28.1 PG (ref 26.6–33)
MCHC RBC AUTO-ENTMCNC: 31.9 G/DL (ref 31.5–35.7)
MCV RBC AUTO: 88 FL (ref 79–97)
MONOCYTES # BLD AUTO: 0.46 10*3/MM3 (ref 0.1–0.9)
MONOCYTES NFR BLD AUTO: 6 % (ref 5–12)
NEUTROPHILS NFR BLD AUTO: 5.85 10*3/MM3 (ref 1.7–7)
NEUTROPHILS NFR BLD AUTO: 75.9 % (ref 42.7–76)
NRBC BLD AUTO-RTO: 0 /100 WBC (ref 0–0.2)
PLATELET # BLD AUTO: 286 10*3/MM3 (ref 140–450)
PMV BLD AUTO: 10.3 FL (ref 6–12)
POTASSIUM SERPL-SCNC: 3.7 MMOL/L (ref 3.5–5.2)
PROT SERPL-MCNC: 7.6 G/DL (ref 6–8.5)
PROTHROMBIN TIME: 18.4 SECONDS (ref 11.8–14.9)
RBC # BLD AUTO: 4.67 10*6/MM3 (ref 3.77–5.28)
SODIUM SERPL-SCNC: 142 MMOL/L (ref 136–145)
WBC NRBC COR # BLD: 7.71 10*3/MM3 (ref 3.4–10.8)
WHOLE BLOOD HOLD COAG: 11
WHOLE BLOOD HOLD SPECIMEN: 11

## 2022-09-13 PROCEDURE — 80053 COMPREHEN METABOLIC PANEL: CPT | Performed by: EMERGENCY MEDICINE

## 2022-09-13 PROCEDURE — 99282 EMERGENCY DEPT VISIT SF MDM: CPT

## 2022-09-13 PROCEDURE — 85610 PROTHROMBIN TIME: CPT

## 2022-09-13 PROCEDURE — 85025 COMPLETE CBC W/AUTO DIFF WBC: CPT

## 2022-09-13 PROCEDURE — 85730 THROMBOPLASTIN TIME PARTIAL: CPT

## 2022-09-13 PROCEDURE — 36415 COLL VENOUS BLD VENIPUNCTURE: CPT

## 2022-09-13 RX ORDER — AMOXICILLIN AND CLAVULANATE POTASSIUM 875; 125 MG/1; MG/1
1 TABLET, FILM COATED ORAL 2 TIMES DAILY
Qty: 20 TABLET | Refills: 0 | Status: SHIPPED | OUTPATIENT
Start: 2022-09-13 | End: 2022-09-23

## 2022-09-13 RX ORDER — OXYCODONE HYDROCHLORIDE AND ACETAMINOPHEN 5; 325 MG/1; MG/1
1 TABLET ORAL EVERY 6 HOURS PRN
Qty: 12 TABLET | Refills: 0 | Status: SHIPPED | OUTPATIENT
Start: 2022-09-13

## 2022-09-13 RX ORDER — TRANEXAMIC ACID 100 MG/ML
500 INJECTION, SOLUTION INTRAVENOUS ONCE
Status: COMPLETED | OUTPATIENT
Start: 2022-09-13 | End: 2022-09-13

## 2022-09-13 RX ADMIN — TRANEXAMIC ACID 500 MG: 100 INJECTION, SOLUTION INTRAVENOUS at 11:58

## 2022-09-13 NOTE — ED PROVIDER NOTES
Time: 11:39 AM EDT  Arrived by: private car  Chief Complaint: nose bleed  History provided by: patient  History is limited by: N/A    History of Present Illness:  Patient is a 72 y.o. year old female who presents to the emergency department with nose bleed.    Patient noted her blood pressure was also elevated this morning.      History provided by:  Patient   used: No    Nose Bleed  Location:  L nare  Severity:  Moderate (Pt resports unable to control bleed initially)  Duration:  7 hours  Timing:  Constant  Progression:  Resolved  Context: anticoagulants (Xeralto)    Relieved by:  Nothing  Worsened by:  Nothing (Medication)  Ineffective treatments:  Applying pressure  Associated symptoms: no congestion, no cough, no fever, no headaches and no sore throat        Patient Care Team  Primary Care Provider: Ally Lagos MD    Past Medical History:     Allergies   Allergen Reactions   • Codeine Nausea Only   • Lisinopril Hives     Past Medical History:   Diagnosis Date   • Acute bronchitis    • Allergic    • Anesthesia complication     PATIENT STAYS SLEEPY A LONGTIME AFTER SURGERY   • Anxiety    • Asthma    • Atrial fibrillation (HCC)    • Borderline hyperglycemia    • Breast cancer (HCC)    • Cat bite    • Depression    • Diarrhea    • GERD (gastroesophageal reflux disease)    • Hypertension    • Hypocalcemia    • Insomnia    • Low back pain    • Low back pain, episodic    • Need for diphtheria-tetanus-pertussis (Tdap) vaccine    • Neuromuscular disorder (HCC)    • Obesity    • Osteoarthritis, generalized    • Panic attack    • Pneumonia     x 3   • Seasonal allergies    • Sleep disturbances    • Trochanteric bursitis    • Vaginal candidiasis    • Yeast infection      Past Surgical History:   Procedure Laterality Date   • BREAST BIOPSY Left 2020    MALIGNANT   • BREAST LUMPECTOMY WITH SENTINEL NODE BIOPSY Left 11/5/2020    Procedure: Left breast charley localized lumpectomy and left axillary sentinel  lymph node biopsy;  Surgeon: Inés Jones MD;  Location:  [facility] MAIN OR;  Service: General;  Laterality: Left;   • CERVICAL CONIZATION, LEEP     • CHOLECYSTECTOMY     • COLONOSCOPY  approx 2011    normal per patient-repeat 10 years   • COLONOSCOPY N/A 6/17/2022    Procedure: COLONOSCOPY INTO CECUM AND T.I. WITH COLD SNARE AND COLD BIOPSY POLYPECTOMIES AND 2CC TATTOO PLACEMENT;  Surgeon: Marlene Amin MD;  Location:  [facility] ENDOSCOPY;  Service: Gastroenterology;  Laterality: N/A;  PRE- POSITIVE COLOGUARD  POST- HEMORRHOIDS, DIVERTICULOSIS, POLYPS, TRANSVERSE MULTILOBE FLAT POLYP NOT REMOVED- TATTOO PLACED   • DILATION AND CURETTAGE, DIAGNOSTIC / THERAPEUTIC     • TONSILLECTOMY     • TUBAL ABDOMINAL LIGATION       Family History   Problem Relation Age of Onset   • Pancreatic cancer Paternal Aunt    • Cervical cancer Mother 41   • Melanoma Sister 60   • Thyroid cancer Sister 60   • Breast cancer Niece 38        TRIPLE -   • Malig Hyperthermia Neg Hx        Home Medications:  Prior to Admission medications    Medication Sig Start Date End Date Taking? Authorizing Provider   Acetaminophen (TYLENOL 8 HOUR ARTHRITIS PAIN PO) Take 650 mg by mouth Daily As Needed.    Mark Patterson MD   atorvastatin (LIPITOR) 20 MG tablet TAKE 1 TABLET BY MOUTH DAILY 8/31/22   Ally Lagos MD   BinaxNOW COVID-19 Ag Home Test kit TEST AS DIRECTED TODAY 5/7/22   Mark Patterson MD   busPIRone (BUSPAR) 15 MG tablet TAKE 1 TABLET BY MOUTH THREE TIMES DAILY 6/6/22   Ally Lagos MD   dicyclomine (BENTYL) 20 MG tablet Take 1 tablet by mouth Every 6 (Six) Hours. 10/20/21   Dotty Chavez APRN   diphenhydrAMINE (BENADRYL) 25 mg capsule Take 25 mg by mouth At Night As Needed for Sleep.    Mark Patterson MD   exemestane (AROMASIN) 25 MG chemo tablet Take 1 tablet by mouth Daily. 8/1/22   Ludy Cartwright MD PhD   fexofenadine (ALLEGRA) 180 MG tablet Take 180 mg by mouth Daily.    Mark Patterson MD    flecainide (TAMBOCOR) 50 MG tablet Take 50 mg by mouth 2 (Two) Times a Day. 3/8/22   Mark Patterson MD   fluconazole (Diflucan) 100 MG tablet Take 1 tablet by mouth Daily. 8/25/22   Ally Lagos MD   gabapentin (NEURONTIN) 100 MG capsule TAKE 3 CAPSULES BY MOUTH EVERY NIGHT 8/8/22   Ally Lagos MD   guaiFENesin (Mucinex) 600 MG 12 hr tablet Take  by mouth.    Mark Patterson MD   hydroCHLOROthiazide (HYDRODIURIL) 12.5 MG tablet Take 1 tablet by mouth Daily. 7/18/22   Ally Lagos MD   metoprolol succinate XL (TOPROL-XL) 25 MG 24 hr tablet Take 1 tablet by mouth 2 (two) times a day.  Patient taking differently: Take 50 mg by mouth. 1/25/21   Ally Lagos MD   olmesartan (BENICAR) 40 MG tablet TAKE 1 TABLET BY MOUTH DAILY 7/28/22   Ally Lagos MD   omeprazole (priLOSEC) 20 MG capsule TAKE 1 CAPSULE BY MOUTH DAILY 5/31/22   Ally Lagos MD   ondansetron ODT (ZOFRAN-ODT) 4 MG disintegrating tablet Place 1 tablet on the tongue 4 (Four) Times a Day As Needed for Nausea or Vomiting. 10/20/21   Dotty Chavez APRN   polyethylene glycol (MiraLax) 17 GM/SCOOP powder Take 17 g by mouth Daily. Take cap of powder with 8oz water daily surrounding surgery and while on narcotic 9/28/20   Inés Jones MD   rivaroxaban (XARELTO) 20 MG tablet Take 20 mg by mouth Daily.    ProviderMark MD        Social History:   Social History     Tobacco Use   • Smoking status: Never Smoker   • Smokeless tobacco: Never Used   Vaping Use   • Vaping Use: Never used   Substance Use Topics   • Alcohol use: No   • Drug use: No     Recent travel: not applicable    Review of Systems:  Review of Systems   Constitutional: Negative for chills and fever.   HENT: Positive for nosebleeds. Negative for congestion, rhinorrhea and sore throat.    Eyes: Negative for pain and visual disturbance.   Respiratory: Negative for apnea, cough, chest tightness and shortness of breath.    Cardiovascular: Negative for chest  "pain and palpitations.   Gastrointestinal: Negative for abdominal pain, diarrhea, nausea and vomiting.   Genitourinary: Negative for difficulty urinating and dysuria.   Musculoskeletal: Negative for joint swelling and myalgias.   Skin: Negative for color change.   Neurological: Positive for light-headedness (mild). Negative for seizures and headaches.   Psychiatric/Behavioral: Negative.    All other systems reviewed and are negative.       Physical Exam:  /80 (BP Location: Right arm, Patient Position: Sitting)   Pulse 88   Temp 97.8 °F (36.6 °C) (Oral)   Resp 16   Ht 162.6 cm (64\")   Wt 117 kg (258 lb 13.1 oz)   LMP  (LMP Unknown)   SpO2 96%   BMI 44.43 kg/m²     Physical Exam  Vitals and nursing note reviewed.   Constitutional:       General: She is not in acute distress.     Appearance: Normal appearance. She is not toxic-appearing.   HENT:      Head: Normocephalic and atraumatic.      Jaw: There is normal jaw occlusion.      Nose:      Right Nostril: No epistaxis.      Left Nostril: No epistaxis.      Mouth/Throat:      Lips: Pink.      Mouth: Mucous membranes are moist.      Comments: No bleeding to posterior oropharynx  Eyes:      General: Lids are normal.      Extraocular Movements: Extraocular movements intact.      Conjunctiva/sclera: Conjunctivae normal.      Pupils: Pupils are equal, round, and reactive to light.   Cardiovascular:      Rate and Rhythm: Normal rate and regular rhythm.      Pulses: Normal pulses.      Heart sounds: Normal heart sounds.   Pulmonary:      Effort: Pulmonary effort is normal. No respiratory distress.      Breath sounds: Normal breath sounds. No wheezing or rhonchi.   Abdominal:      General: Abdomen is flat.      Palpations: Abdomen is soft.      Tenderness: There is no abdominal tenderness. There is no guarding or rebound.   Musculoskeletal:         General: Normal range of motion.      Cervical back: Normal range of motion and neck supple.      Right lower leg: " No edema.      Left lower leg: No edema.   Skin:     General: Skin is warm and dry.   Neurological:      Mental Status: She is alert and oriented to person, place, and time. Mental status is at baseline.   Psychiatric:         Mood and Affect: Mood normal.                Medications in the Emergency Department:  Medications   tranexamic acid 500 MG/5ML nasal (ED/Crit Care for epistaxis) - VIAL DISPENSE 500 mg (500 mg Nasal Given 9/13/22 1158)        Labs  Lab Results (last 24 hours)     Procedure Component Value Units Date/Time    CBC & Differential [[id number]]  (Abnormal) Collected: 09/13/22 1055    Specimen: Blood Updated: 09/13/22 1116    Narrative:      The following orders were created for panel order CBC & Differential.  Procedure                               Abnormality         Status                     ---------                               -----------         ------                     CBC Auto Differential[[id number]]        Abnormal            Final result                 Please view results for these tests on the individual orders.    Comprehensive Metabolic Panel [[id number]]  (Abnormal) Collected: 09/13/22 1055    Specimen: Blood Updated: 09/13/22 1147     Glucose 129 mg/dL      BUN 7 mg/dL      Creatinine 0.87 mg/dL      Sodium 142 mmol/L      Potassium 3.7 mmol/L      Chloride 105 mmol/L      CO2 27.0 mmol/L      Calcium 9.5 mg/dL      Total Protein 7.6 g/dL      Albumin 4.60 g/dL      ALT (SGPT) 20 U/L      AST (SGOT) 21 U/L      Alkaline Phosphatase 133 U/L      Total Bilirubin 0.4 mg/dL      Globulin 3.0 gm/dL      A/G Ratio 1.5 g/dL      BUN/Creatinine Ratio 8.0     Anion Gap 10.0 mmol/L      eGFR 70.0 mL/min/1.73      Comment: National Kidney Foundation and American Society of Nephrology (ASN) Task Force recommended calculation based on the Chronic Kidney Disease Epidemiology Collaboration (CKD-EPI) equation refit without adjustment for race.       Narrative:      GFR Normal >60  Chronic Kidney  Disease <60  Kidney Failure <15      Protime-INR [[id number]]  (Abnormal) Collected: 09/13/22 1055    Specimen: Blood Updated: 09/13/22 1127     Protime 18.4 Seconds      INR 1.50    Narrative:      Suggested Therapeutic Ranges For Oral Anticoagulant Therapy:  Level of Therapy                      INR Target Range  Standard Dose                            2.0-3.0  High Dose                                2.5-3.5  Patients not receiving anticoagulant  Therapy Normal Range                     0.86-1.15    aPTT [[id number]]  (Abnormal) Collected: 09/13/22 1055    Specimen: Blood Updated: 09/13/22 1127     PTT 41.5 seconds     CBC Auto Differential [[id number]]  (Abnormal) Collected: 09/13/22 1055    Specimen: Blood Updated: 09/13/22 1116     WBC 7.71 10*3/mm3      RBC 4.67 10*6/mm3      Hemoglobin 13.1 g/dL      Hematocrit 41.1 %      MCV 88.0 fL      MCH 28.1 pg      MCHC 31.9 g/dL      RDW 13.8 %      RDW-SD 44.3 fl      MPV 10.3 fL      Platelets 286 10*3/mm3      Neutrophil % 75.9 %      Lymphocyte % 15.0 %      Monocyte % 6.0 %      Eosinophil % 2.2 %      Basophil % 0.6 %      Immature Grans % 0.3 %      Neutrophils, Absolute 5.85 10*3/mm3      Lymphocytes, Absolute 1.16 10*3/mm3      Monocytes, Absolute 0.46 10*3/mm3      Eosinophils, Absolute 0.17 10*3/mm3      Basophils, Absolute 0.05 10*3/mm3      Immature Grans, Absolute 0.02 10*3/mm3      nRBC 0.0 /100 WBC            Imaging:  No Radiology Exams Resulted Within Past 24 Hours    Procedures:  Epistaxis Management    Date/Time: 9/13/2022 12:15 PM  Performed by: Curt Tavares MD  Authorized by: Curt Tavares MD     Consent:     Consent obtained:  Verbal    Consent given by:  Patient    Risks, benefits, and alternatives were discussed: yes    Universal protocol:     Patient identity confirmed:  Verbally with patient  Procedure details:     Treatment site:  L anterior    Treatment method:  Anterior pack  Post-procedure details:     Assessment:  Bleeding  stopped        Progress                            Medical Decision Making:  MDM  Number of Diagnoses or Management Options  Epistaxis  Diagnosis management comments: The patient´s CBC was reviewed and shows no abnormalities of critical concern. Of note, there is no anemia requiring a blood transfusion and the platelet count is acceptable.  The patient´s CMP was reviewed and shows no abnormalities of critical concern. Of note, the patient´s sodium and potassium are acceptable. The patient´s liver enzymes are unremarkable. The patient´s renal function (creatinine) is preserved. The patient has a normal anion gap.  INR is 1.5.  Packing was placed in emergency department.  The patient was monitored for 2 hours with no return of epistaxis.       Amount and/or Complexity of Data Reviewed  Clinical lab tests: reviewed    Risk of Complications, Morbidity, and/or Mortality  Presenting problems: high  Management options: high    Patient Progress  Patient progress: stable       Final diagnoses:   Epistaxis        Disposition:  ED Disposition     ED Disposition   Discharge    Condition   Stable    Comment   --             This medical record created using voice recognition software and a virtual scribe.    Documentation assistance provided by Shakila Marti acting as scribe for Dr. Curt Tavares MD Information recorded by the scribe was done at my direction and has been verified and validated by me.       Shakila Marti  09/13/22 3546       Curt Tavares MD  09/13/22 1214

## 2022-09-14 ENCOUNTER — TELEPHONE (OUTPATIENT)
Dept: INTERNAL MEDICINE | Facility: CLINIC | Age: 72
End: 2022-09-14

## 2022-09-14 DIAGNOSIS — R04.0 BLEEDING NOSE: Primary | ICD-10-CM

## 2022-09-14 NOTE — TELEPHONE ENCOUNTER
I do not unpack noses- sorry.   Also Xarelto question to Dr. Prince.  I think yes, she continues to take it.

## 2022-09-14 NOTE — TELEPHONE ENCOUNTER
Caller: Helena Romero    Relationship: Self    Best call back number: [phone]         Who are you requesting to speak with (clinical staff, provider,  specific staff member): CLINICAL      What was the call regarding: PATIENT JUST WANTED TO UPDATE PCP THAT SHE HAS AN APPOINTMENT TO GET HER NOSE UNPACKED TOMORROW 9/15/22.    Do you require a callback: NO        DELETE AFTER READING TO PATIENT: “ Thank you for sharing this information with me. I will send a message to the . Please allow up to 48 hours for the  to follow up on this request.”

## 2022-09-14 NOTE — TELEPHONE ENCOUNTER
Patient is calling regarding having her nose unpacked, went to the ER yesterday for a nose bleed and was told to follow with her primary care and have it unpacked, patient also wants to see what she needs to do about taking her Xarelto, please call Ms. Romero at [phone]  Thanks.

## 2022-09-19 ENCOUNTER — OFFICE VISIT (OUTPATIENT)
Dept: OTOLARYNGOLOGY | Facility: CLINIC | Age: 72
End: 2022-09-19

## 2022-09-19 DIAGNOSIS — R04.0 EPISTAXIS: Primary | ICD-10-CM

## 2022-09-19 DIAGNOSIS — Z79.01 CHRONIC ANTICOAGULATION: ICD-10-CM

## 2022-09-19 PROCEDURE — 99203 OFFICE O/P NEW LOW 30 MIN: CPT | Performed by: OTOLARYNGOLOGY

## 2022-09-19 NOTE — PROGRESS NOTES
Patient Name: Helena Romero   Visit Date: 09/19/2022   Patient ID: [id number]  Provider: Manny Sheets MD    Sex: female  [place] [facility]   YOB: 1950  Location Address: [address] [place] [address], [address] [place] [address],?[place]?[id number]    Primary Care Provider Ally Lagos MD  Location Phone: [phone]    Referring Provider: No ref. provider found        Chief Complaint  No chief complaint on file.    History of Present Illness  Helena Romero is a 72 y.o. female who presents to [facility] today as a consult from No ref. provider found for evaluation of epistaxis.  She underwent left-sided packing placement by Dr. Tavares on 9/13/2022.  At the time her partial thromboplastin time was slightly elevated at 41.5 with a slightly elevated prothrombin time of 18.4 and INR of 1.50.  Her CBC revealed a hemoglobin of 13.1 with 286,000 platelets. She tells me that she has never had any issues with nosebleeds in the past. She does report some nasal congestion and rhinorrhea. She reports breaking her nose playing basketball in high school. She denies any prior nasal surgeries. She is on xarelto.       Past Medical History:   Diagnosis Date   • Acute bronchitis    • Allergic    • Anesthesia complication     PATIENT STAYS SLEEPY A LONGTIME AFTER SURGERY   • Anxiety    • Asthma    • Atrial fibrillation (HCC)    • Borderline hyperglycemia    • Breast cancer (HCC)    • Cat bite    • Depression    • Diarrhea    • GERD (gastroesophageal reflux disease)    • Headache    • Hypertension    • Hypocalcemia    • Insomnia    • Low back pain    • Low back pain, episodic    • Need for diphtheria-tetanus-pertussis (Tdap) vaccine    • Neuromuscular disorder (HCC)    • Nosebleed    • Obesity    • Osteoarthritis, generalized    • Panic attack    • Pneumonia     x 3   • Seasonal allergies    • Sleep disturbances    • Trochanteric bursitis    • Vaginal candidiasis    • Yeast  infection        Past Surgical History:   Procedure Laterality Date   • BREAST BIOPSY Left 2020    MALIGNANT   • BREAST LUMPECTOMY WITH SENTINEL NODE BIOPSY Left 11/5/2020    Procedure: Left breast charley localized lumpectomy and left axillary sentinel lymph node biopsy;  Surgeon: Inés Jones MD;  Location: [facility] MAIN OR;  Service: General;  Laterality: Left;   • CERVICAL CONIZATION, LEEP     • CHOLECYSTECTOMY     • COLONOSCOPY  approx 2011    normal per patient-repeat 10 years   • COLONOSCOPY N/A 6/17/2022    Procedure: COLONOSCOPY INTO CECUM AND T.I. WITH COLD SNARE AND COLD BIOPSY POLYPECTOMIES AND 2CC TATTOO PLACEMENT;  Surgeon: Marlene Amin MD;  Location: [facility] ENDOSCOPY;  Service: Gastroenterology;  Laterality: N/A;  PRE- POSITIVE COLOGUARD  POST- HEMORRHOIDS, DIVERTICULOSIS, POLYPS, TRANSVERSE MULTILOBE FLAT POLYP NOT REMOVED- TATTOO PLACED   • DILATION AND CURETTAGE, DIAGNOSTIC / THERAPEUTIC     • TONSILLECTOMY     • TUBAL ABDOMINAL LIGATION           Current Outpatient Medications:   •  Acetaminophen (TYLENOL 8 HOUR ARTHRITIS PAIN PO), Take 650 mg by mouth Daily As Needed., Disp: , Rfl:   •  amoxicillin-clavulanate (AUGMENTIN) 875-125 MG per tablet, Take 1 tablet by mouth 2 (Two) Times a Day for 10 days., Disp: 20 tablet, Rfl: 0  •  atorvastatin (LIPITOR) 20 MG tablet, TAKE 1 TABLET BY MOUTH DAILY, Disp: 90 tablet, Rfl: 1  •  BinaxNOW COVID-19 Ag Home Test kit, TEST AS DIRECTED TODAY, Disp: , Rfl:   •  busPIRone (BUSPAR) 15 MG tablet, TAKE 1 TABLET BY MOUTH THREE TIMES DAILY, Disp: 270 tablet, Rfl: 1  •  dicyclomine (BENTYL) 20 MG tablet, Take 1 tablet by mouth Every 6 (Six) Hours., Disp: 20 tablet, Rfl: 0  •  diphenhydrAMINE (BENADRYL) 25 mg capsule, Take 25 mg by mouth At Night As Needed for Sleep., Disp: , Rfl:   •  exemestane (AROMASIN) 25 MG chemo tablet, Take 1 tablet by mouth Daily., Disp: 90 tablet, Rfl: 1  •  fexofenadine (ALLEGRA) 180 MG tablet, Take 180 mg by mouth Daily., Disp: ,  Rfl:   •  flecainide (TAMBOCOR) 50 MG tablet, Take 50 mg by mouth 2 (Two) Times a Day., Disp: , Rfl:   •  fluconazole (Diflucan) 100 MG tablet, Take 1 tablet by mouth Daily., Disp: 7 tablet, Rfl: 0  •  gabapentin (NEURONTIN) 100 MG capsule, TAKE 3 CAPSULES BY MOUTH EVERY NIGHT, Disp: 90 capsule, Rfl: 1  •  guaiFENesin (Mucinex) 600 MG 12 hr tablet, Take  by mouth., Disp: , Rfl:   •  hydroCHLOROthiazide (HYDRODIURIL) 12.5 MG tablet, Take 1 tablet by mouth Daily., Disp: 90 tablet, Rfl: 1  •  metoprolol succinate XL (TOPROL-XL) 25 MG 24 hr tablet, Take 1 tablet by mouth 2 (two) times a day. (Patient taking differently: Take 50 mg by mouth.), Disp: 180 tablet, Rfl: 1  •  olmesartan (BENICAR) 40 MG tablet, TAKE 1 TABLET BY MOUTH DAILY, Disp: 90 tablet, Rfl: 1  •  omeprazole (priLOSEC) 20 MG capsule, TAKE 1 CAPSULE BY MOUTH DAILY, Disp: 90 capsule, Rfl: 1  •  ondansetron ODT (ZOFRAN-ODT) 4 MG disintegrating tablet, Place 1 tablet on the tongue 4 (Four) Times a Day As Needed for Nausea or Vomiting., Disp: 15 tablet, Rfl: 0  •  oxyCODONE-acetaminophen (PERCOCET) 5-325 MG per tablet, Take 1 tablet by mouth Every 6 (Six) Hours As Needed for Severe Pain., Disp: 12 tablet, Rfl: 0  •  polyethylene glycol (MiraLax) 17 GM/SCOOP powder, Take 17 g by mouth Daily. Take cap of powder with 8oz water daily surrounding surgery and while on narcotic, Disp: 119 g, Rfl: 0  •  rivaroxaban (XARELTO) 20 MG tablet, Take 20 mg by mouth Daily., Disp: , Rfl:      Allergies   Allergen Reactions   • Codeine Nausea Only   • Lisinopril Hives   • Azithromycin Unknown - Low Severity       Social History     Tobacco Use   • Smoking status: Never Smoker   • Smokeless tobacco: Never Used   Vaping Use   • Vaping Use: Never used   Substance Use Topics   • Alcohol use: No   • Drug use: No        Objective     Vital Signs:   There were no vitals taken for this visit.      Physical Exam    General: Well developed, well nourished patient of stated age in no  acute distress. Voice is strong and clear.   Head: Normocephalic and atraumatic.  Face: No lesions.  Bilateral parotid and submandibular glands are unremarkable.  Stensen's and Warthin's ducts are productive of clear saliva bilaterally.  House-Brackmann I/VI     bilaterally.   muscles and temporomandibular joint nontender to palpation.  No TMJ crepitus.  Eyes: PERRLA, sclerae anicteric, no conjunctival injection. Extraocular movements are intact and full. No nystagmus.   Ears: Auricles are normal in appearance. Bilateral external auditory canals are unremarkable.  Right tympanic membrane is unremarkable.  Left tympanic membrane is slightly retracted.  Hearing normal to conversational voice.   Nose: External nose is normal in appearance.  Right nasal cavity is unremarkable.  Left nasal cavity with Merocel packing in place.  This was removed revealing mildly edematous mucosa. Septum midline.  Inferior turbinates are unremarkable.  Left middle turbinate with a small telangiectasia versus abrasion present.. No lesions.   Oral Cavity: Lips are normal in appearance. Oral mucosa is unremarkable. Gingiva is unremarkable.  Partial dentition for age. Tongue is unremarkable with good movement. Hard palate is unremarkable.   Oropharynx: Soft palate is unremarkable with full movement. Uvula is unremarkable. Bilateral tonsils are surgically absent. Posterior oropharynx is unremarkable.    Larynx and hypopharynx: Deferred secondary to gag reflex.  Neck: Supple.  No mass.  Nontender to palpation.  Trachea midline. Thyroid normal size and without nodules to palpation.   Lymphatic: No lymphadenopathy upon palpation.  Respiratory: Clear to auscultation bilaterally, nonlabored respirations    Cardiovascular: RRR, no murmurs, rubs, or gallops,   Psychiatric: Appropriate affect, cooperative   Neurologic: Oriented x 3, strength symmetric in all extremities, Cranial Nerves II-XII are grossly intact to confrontation   Skin: Warm  and dry. No rashes.    Procedures           Result Review :               Assessment and Plan    Diagnoses and all orders for this visit:    1. Epistaxis (Primary)    2. Chronic anticoagulation    Impressions and findings were discussed at great length.  Currently, her left-sided nasal packing was removed there is evidence of a small abrasion versus telangiectasia involving the left middle turbinate.  We discussed the pathophysiology and natural history of epistaxis.  Options for management were discussed and I have recommended she begin using saline sprays twice daily and avoid nasal steroid sprays for the time being.  She is to be gentle with her nose and avoid blowing it over the next week.  She was given ample time to ask questions, all of which were answered to her satisfaction.        Follow Up   Return if symptoms worsen or fail to improve.  Patient was given instructions and counseling regarding her condition or for health maintenance advice. Please see specific information pulled into the AVS if appropriate.

## 2022-10-12 ENCOUNTER — TELEPHONE (OUTPATIENT)
Dept: INTERNAL MEDICINE | Facility: CLINIC | Age: 72
End: 2022-10-12

## 2022-10-12 NOTE — TELEPHONE ENCOUNTER
Patient is complaining of left ear pain and is wanting to be seen sometime tomorrow with Dr. Lagos  [phone].

## 2022-10-13 ENCOUNTER — OFFICE VISIT (OUTPATIENT)
Dept: INTERNAL MEDICINE | Facility: CLINIC | Age: 72
End: 2022-10-13

## 2022-10-13 VITALS
WEIGHT: 258 LBS | BODY MASS INDEX: 44.05 KG/M2 | SYSTOLIC BLOOD PRESSURE: 134 MMHG | HEART RATE: 74 BPM | DIASTOLIC BLOOD PRESSURE: 76 MMHG | HEIGHT: 64 IN | TEMPERATURE: 97.3 F

## 2022-10-13 DIAGNOSIS — I10 ESSENTIAL HYPERTENSION: ICD-10-CM

## 2022-10-13 DIAGNOSIS — R05.1 ACUTE COUGH: Primary | ICD-10-CM

## 2022-10-13 PROCEDURE — 99214 OFFICE O/P EST MOD 30 MIN: CPT | Performed by: INTERNAL MEDICINE

## 2022-10-13 RX ORDER — CEFDINIR 300 MG/1
300 CAPSULE ORAL 2 TIMES DAILY
Qty: 20 CAPSULE | Refills: 0 | Status: SHIPPED | OUTPATIENT
Start: 2022-10-13

## 2022-10-13 RX ORDER — PROMETHAZINE HYDROCHLORIDE AND CODEINE PHOSPHATE 6.25; 1 MG/5ML; MG/5ML
5 SYRUP ORAL EVERY 4 HOURS PRN
Qty: 240 ML | Refills: 0 | Status: SHIPPED | OUTPATIENT
Start: 2022-10-13

## 2022-10-13 RX ORDER — METOPROLOL SUCCINATE 50 MG/1
50 TABLET, EXTENDED RELEASE ORAL 2 TIMES DAILY
COMMUNITY
Start: 2022-09-26

## 2022-10-13 RX ORDER — AMOXICILLIN AND CLAVULANATE POTASSIUM 500; 125 MG/1; MG/1
TABLET, FILM COATED ORAL
COMMUNITY
Start: 2022-10-07 | End: 2022-10-13

## 2022-10-13 NOTE — PROGRESS NOTES
"Chief Complaint  Earache (Left side)    Subjective        Helena Romero presents to [facility]  History of Present Illness  Has had to have sinuses packed due to uncontrolled bleeding.  Removed 9/19- since then she has persistent sinus congestion- can't use decongestants, etc.  Using nasal mist and Vaseline only.   Has some L ear pain for the last several days.   Coughing up green sputum, blowing nose lightly with some green/bloody d/c.     Objective   Vital Signs:  /76   Pulse 74   Temp 97.3 °F (36.3 °C)   Ht 162.6 cm (64\")   Wt 117 kg (258 lb)   BMI 44.29 kg/m²   Estimated body mass index is 44.29 kg/m² as calculated from the following:    Height as of this encounter: 162.6 cm (64\").    Weight as of this encounter: 117 kg (258 lb).          Physical Exam  Constitutional:       General: She is not in acute distress.     Appearance: Normal appearance. She is obese.   HENT:      Right Ear: Tympanic membrane normal.      Left Ear: External ear normal. Tenderness present. Tympanic membrane is bulging.   Cardiovascular:      Rate and Rhythm: Normal rate and regular rhythm.   Pulmonary:      Effort: Pulmonary effort is normal. No respiratory distress.      Breath sounds: No wheezing.   Musculoskeletal:      Right lower leg: No edema.      Left lower leg: No edema.   Lymphadenopathy:      Cervical: No cervical adenopathy.        Result Review :                Assessment and Plan   Diagnoses and all orders for this visit:    1. Acute cough (Primary)  Comments:  likely all related to her sinus issues- will tx with abx and cough syrup for some relief.  Call if develops problems.   Orders:  -     promethazine-codeine (PHENERGAN with CODEINE) 6.25-10 MG/5ML syrup; Take 5 mL by mouth Every 4 (Four) Hours As Needed for Cough.  Dispense: 240 mL; Refill: 0    2. Essential hypertension  Comments:  Well controlled    Other orders  -     cefdinir (OMNICEF) 300 MG capsule; Take 1 capsule by " mouth 2 (Two) Times a Day.  Dispense: 20 capsule; Refill: 0             Follow Up   Return in about 4 months (around 2/13/2023) for Medicare Wellness, Lab Before FUP.  Patient was given instructions and counseling regarding her condition or for health maintenance advice. Please see specific information pulled into the AVS if appropriate.

## 2022-10-24 ENCOUNTER — OFFICE VISIT (OUTPATIENT)
Dept: OTOLARYNGOLOGY | Facility: CLINIC | Age: 72
End: 2022-10-24

## 2022-10-24 VITALS — WEIGHT: 258 LBS | HEIGHT: 64 IN | BODY MASS INDEX: 44.05 KG/M2 | TEMPERATURE: 98.3 F

## 2022-10-24 DIAGNOSIS — R04.0 EPISTAXIS: Primary | ICD-10-CM

## 2022-10-24 DIAGNOSIS — Z79.01 CHRONIC ANTICOAGULATION: ICD-10-CM

## 2022-10-24 PROCEDURE — 99212 OFFICE O/P EST SF 10 MIN: CPT | Performed by: OTOLARYNGOLOGY

## 2022-10-24 PROCEDURE — 30901 CONTROL OF NOSEBLEED: CPT | Performed by: OTOLARYNGOLOGY

## 2022-10-24 NOTE — PROGRESS NOTES
Patient Name: Helena Romero   Visit Date: 10/24/2022   Patient ID: [id number]  Provider: Manny Sheets MD    Sex: female  Location: [facility]   YOB: 1950  Location Address: [address] [place] [address], [address],?[place]?[id number]    Primary Care Provider Ally Lagos MD  Location Phone: [phone]    Referring Provider: No ref. provider found        Chief Complaint  Nosebleed x 1 hour    History of Present Illness  Helena Romero is a 72 y.o. female who returns today for follow-up of recurrent epistaxis.  She was originally seen on 9/19/2022 for removal of left-sided nasal packing which was placed by Dr. Tavares on 9/13/2022.  Examination that day revealed a small abrasion versus telangiectasia on the left middle turbinate but was otherwise unremarkable.  She was on Xarelto and we discussed saline sprays.  She tells me that around 8:15 AM this morning the left side of her nose began bleeding profusely.  It has bled steadily since and has not responded to her placing a tampon on that side of her nose.    Past Medical History:   Diagnosis Date   • Acute bronchitis    • Allergic    • Anesthesia complication     PATIENT STAYS SLEEPY A LONGTIME AFTER SURGERY   • Anxiety    • Asthma    • Atrial fibrillation (HCC)    • Borderline hyperglycemia    • Breast cancer (HCC)    • Cat bite    • Depression    • Diarrhea    • GERD (gastroesophageal reflux disease)    • Headache    • Hypertension    • Hypocalcemia    • Insomnia    • Low back pain    • Low back pain, episodic    • Need for diphtheria-tetanus-pertussis (Tdap) vaccine    • Neuromuscular disorder (HCC)    • Nosebleed    • Obesity    • Osteoarthritis, generalized    • Panic attack    • Pneumonia     x 3   • Seasonal allergies    • Sleep disturbances    • Trochanteric bursitis    • Vaginal candidiasis    • Yeast infection        Past Surgical History:   Procedure Laterality Date   • BREAST BIOPSY Left 2020    MALIGNANT   • BREAST  LUMPECTOMY WITH SENTINEL NODE BIOPSY Left 11/5/2020    Procedure: Left breast charley localized lumpectomy and left axillary sentinel lymph node biopsy;  Surgeon: Inés Jones MD;  Location: [facility] MAIN OR;  Service: General;  Laterality: Left;   • CERVICAL CONIZATION, LEEP     • CHOLECYSTECTOMY     • COLONOSCOPY  approx 2011    normal per patient-repeat 10 years   • COLONOSCOPY N/A 6/17/2022    Procedure: COLONOSCOPY INTO CECUM AND T.I. WITH COLD SNARE AND COLD BIOPSY POLYPECTOMIES AND 2CC TATTOO PLACEMENT;  Surgeon: Marlene Amin MD;  Location: [facility] ENDOSCOPY;  Service: Gastroenterology;  Laterality: N/A;  PRE- POSITIVE COLOGUARD  POST- HEMORRHOIDS, DIVERTICULOSIS, POLYPS, TRANSVERSE MULTILOBE FLAT POLYP NOT REMOVED- TATTOO PLACED   • DILATION AND CURETTAGE, DIAGNOSTIC / THERAPEUTIC     • TONSILLECTOMY     • TUBAL ABDOMINAL LIGATION           Current Outpatient Medications:   •  Acetaminophen (TYLENOL 8 HOUR ARTHRITIS PAIN PO), Take 650 mg by mouth Daily As Needed., Disp: , Rfl:   •  atorvastatin (LIPITOR) 20 MG tablet, TAKE 1 TABLET BY MOUTH DAILY, Disp: 90 tablet, Rfl: 1  •  busPIRone (BUSPAR) 15 MG tablet, TAKE 1 TABLET BY MOUTH THREE TIMES DAILY, Disp: 270 tablet, Rfl: 1  •  cefdinir (OMNICEF) 300 MG capsule, Take 1 capsule by mouth 2 (Two) Times a Day., Disp: 20 capsule, Rfl: 0  •  dicyclomine (BENTYL) 20 MG tablet, Take 1 tablet by mouth Every 6 (Six) Hours., Disp: 20 tablet, Rfl: 0  •  diphenhydrAMINE (BENADRYL) 25 mg capsule, Take 25 mg by mouth At Night As Needed for Sleep., Disp: , Rfl:   •  exemestane (AROMASIN) 25 MG chemo tablet, Take 1 tablet by mouth Daily., Disp: 90 tablet, Rfl: 1  •  flecainide (TAMBOCOR) 50 MG tablet, Take 50 mg by mouth 2 (Two) Times a Day., Disp: , Rfl:   •  fluconazole (Diflucan) 100 MG tablet, Take 1 tablet by mouth Daily., Disp: 7 tablet, Rfl: 0  •  gabapentin (NEURONTIN) 100 MG capsule, TAKE 3 CAPSULES BY MOUTH EVERY NIGHT, Disp: 90 capsule, Rfl: 1  •   "hydroCHLOROthiazide (HYDRODIURIL) 12.5 MG tablet, Take 1 tablet by mouth Daily., Disp: 90 tablet, Rfl: 1  •  metoprolol succinate XL (TOPROL-XL) 25 MG 24 hr tablet, Take 1 tablet by mouth 2 (two) times a day. (Patient taking differently: Take 2 tablets by mouth.), Disp: 180 tablet, Rfl: 1  •  metoprolol succinate XL (TOPROL-XL) 50 MG 24 hr tablet, Take 1 tablet by mouth 2 (Two) Times a Day., Disp: , Rfl:   •  olmesartan (BENICAR) 40 MG tablet, TAKE 1 TABLET BY MOUTH DAILY, Disp: 90 tablet, Rfl: 1  •  omeprazole (priLOSEC) 20 MG capsule, TAKE 1 CAPSULE BY MOUTH DAILY, Disp: 90 capsule, Rfl: 1  •  ondansetron ODT (ZOFRAN-ODT) 4 MG disintegrating tablet, Place 1 tablet on the tongue 4 (Four) Times a Day As Needed for Nausea or Vomiting., Disp: 15 tablet, Rfl: 0  •  oxyCODONE-acetaminophen (PERCOCET) 5-325 MG per tablet, Take 1 tablet by mouth Every 6 (Six) Hours As Needed for Severe Pain., Disp: 12 tablet, Rfl: 0  •  polyethylene glycol (MiraLax) 17 GM/SCOOP powder, Take 17 g by mouth Daily. Take cap of powder with 8oz water daily surrounding surgery and while on narcotic, Disp: 119 g, Rfl: 0  •  promethazine-codeine (PHENERGAN with CODEINE) 6.25-10 MG/5ML syrup, Take 5 mL by mouth Every 4 (Four) Hours As Needed for Cough., Disp: 240 mL, Rfl: 0  •  rivaroxaban (XARELTO) 20 MG tablet, Take 20 mg by mouth Daily., Disp: , Rfl:      Allergies   Allergen Reactions   • Codeine Nausea Only   • Lisinopril Hives   • Azithromycin Unknown - Low Severity       Social History     Tobacco Use   • Smoking status: Never   • Smokeless tobacco: Never   Vaping Use   • Vaping Use: Never used   Substance Use Topics   • Alcohol use: No   • Drug use: No        Objective     Vital Signs:   Temp 98.3 °F (36.8 °C) (Temporal)   Ht 162.6 cm (64\")   Wt 117 kg (258 lb)   BMI 44.29 kg/m²       Physical Exam    General: Well developed, well nourished patient of stated age in no acute distress. Voice is strong and clear.   Head: Normocephalic and " atraumatic.  Face: No lesions.  Bilateral parotid and submandibular glands are unremarkable.  Stensen's and Warthin's ducts are productive of clear saliva bilaterally.  House-Brackmann I/VI     bilaterally.   muscles and temporomandibular joint nontender to palpation.  No TMJ crepitus.  Eyes: PERRLA, sclerae anicteric, no conjunctival injection. Extraocular movements are intact and full. No nystagmus.   Ears: Auricles are normal in appearance. Bilateral external auditory canals are unremarkable.  Right tympanic membrane is unremarkable.  Left tympanic membrane is slightly retracted.  Hearing normal to conversational voice.   Nose: External nose is normal in appearance.  Right nasal cavity with bright red blood.  Left nasal cavity with a tampon protruding. No lesions.   Oral Cavity: Lips are normal in appearance. Oral mucosa is unremarkable. Gingiva is unremarkable.  Partial dentition for age. Tongue is unremarkable with good movement. Hard palate is unremarkable.   Oropharynx: Soft palate is unremarkable with full movement. Uvula is unremarkable. Bilateral tonsils are surgically absent. Posterior oropharynx is unremarkable.    Larynx and hypopharynx: Deferred secondary to gag reflex.  Neck: Supple.  No mass.  Nontender to palpation.  Trachea midline. Thyroid normal size and without nodules to palpation.   Lymphatic: No lymphadenopathy upon palpation.  Respiratory: Clear to auscultation bilaterally, nonlabored respirations    Cardiovascular: RRR, no murmurs, rubs, or gallops,   Psychiatric: Appropriate affect, cooperative   Neurologic: Oriented x 3, strength symmetric in all extremities, Cranial Nerves II-XII are grossly intact to confrontation   Skin: Warm and dry. No rashes.    Procedures     Endoscopic control of epistaxis/nasal cautery: Her bilateral nasal cavities were decongested and anesthetized with Afrin and lidocaine sprays respectively.  After giving the medication ample time to take effect her  bilateral nasal cavities were suctioned free of blood revealing active bleeding from the left anterior nasal septum from what appeared to be a small granuloma.  This was cauterized repeatedly with silver nitrate and hemostasis achieved.  The remainder of the nasal cavities were inspected using a 30 degree endoscope and found to be without lesion or active bleeding.  The patient tolerated the procedure well.      Result Review :               Assessment and Plan    Diagnoses and all orders for this visit:    1. Epistaxis (Primary)    2. Chronic anticoagulation    Impressions and findings were discussed at great length.  Unfortunately, she developed left-sided epistaxis this morning which has been profuse.  Examination today is concerning for granulation tissue bleeding on the left anterior nasal septum.  Options for management were discussed and she elected to pursue cauterization which was performed repeatedly with silver nitrate.  Hemostasis was obtained.  We discussed post cauterization care we will follow-up in 4 weeks or sooner if needed.      Follow Up   Return in about 4 weeks (around 11/21/2022).  Patient was given instructions and counseling regarding her condition or for health maintenance advice. Please see specific information pulled into the AVS if appropriate.

## 2022-10-26 ENCOUNTER — OFFICE VISIT (OUTPATIENT)
Dept: OTOLARYNGOLOGY | Facility: CLINIC | Age: 72
End: 2022-10-26

## 2022-10-26 VITALS — BODY MASS INDEX: 44.05 KG/M2 | TEMPERATURE: 98 F | WEIGHT: 258 LBS | HEIGHT: 64 IN

## 2022-10-26 DIAGNOSIS — R04.0 RECURRENT EPISTAXIS: ICD-10-CM

## 2022-10-26 DIAGNOSIS — Z79.01 CHRONIC ANTICOAGULATION: Primary | ICD-10-CM

## 2022-10-26 PROCEDURE — 30901 CONTROL OF NOSEBLEED: CPT | Performed by: OTOLARYNGOLOGY

## 2022-10-26 NOTE — PROGRESS NOTES
Patient Name: Helena Romero   Visit Date: 10/24/2022   Patient ID: [id number]  Provider: Manny Sheets MD    Sex: female  Location: [facility]   YOB: 1950  Location Address: [address] [place] [address], [address],?[place]?[id number]    Primary Care Provider Ally Lagos MD  Location Phone: [phone]    Referring Provider: No ref. provider found        Chief Complaint  Nose Bleed    History of Present Illness  Helena Romero is a 72 y.o. female who returns today for follow-up of recurrent epistaxis.  She was originally seen on 9/19/2022 for removal of left-sided nasal packing which was placed by Dr. Tavares on 9/13/2022.  Examination that day revealed a small abrasion versus telangiectasia on the left middle turbinate but was otherwise unremarkable.  She was on Xarelto and we discussed saline sprays.  She was last seen on 10/24/2022 at which time she was actively bleeding from her left anterior nasal septum which was cauterized repeatedly.  She tells me that this did fairly well until 6 AM this morning when it began bleeding profusely.  Her nose and throat have been productive of large clots.  She tried holding pressure and putting ice on her nose without any improvement.  She has been worried about using antibiotic ointment at home that she is nervous about knocking off the scab.    Past Medical History:   Diagnosis Date   • Acute bronchitis    • Allergic    • Anesthesia complication     PATIENT STAYS SLEEPY A LONGTIME AFTER SURGERY   • Anxiety    • Asthma    • Atrial fibrillation (HCC)    • Borderline hyperglycemia    • Breast cancer (HCC)    • Cat bite    • Depression    • Diarrhea    • GERD (gastroesophageal reflux disease)    • Headache    • Hypertension    • Hypocalcemia    • Insomnia    • Low back pain    • Low back pain, episodic    • Need for diphtheria-tetanus-pertussis (Tdap) vaccine    • Neuromuscular disorder (HCC)    • Nosebleed    • Obesity    • Osteoarthritis,  generalized    • Panic attack    • Pneumonia     x 3   • Seasonal allergies    • Sleep disturbances    • Trochanteric bursitis    • Vaginal candidiasis    • Yeast infection        Past Surgical History:   Procedure Laterality Date   • BREAST BIOPSY Left 2020    MALIGNANT   • BREAST LUMPECTOMY WITH SENTINEL NODE BIOPSY Left 11/5/2020    Procedure: Left breast charley localized lumpectomy and left axillary sentinel lymph node biopsy;  Surgeon: Inés Jones MD;  Location: [facility] MAIN OR;  Service: General;  Laterality: Left;   • CERVICAL CONIZATION, LEEP     • CHOLECYSTECTOMY     • COLONOSCOPY  approx 2011    normal per patient-repeat 10 years   • COLONOSCOPY N/A 6/17/2022    Procedure: COLONOSCOPY INTO CECUM AND T.I. WITH COLD SNARE AND COLD BIOPSY POLYPECTOMIES AND 2CC TATTOO PLACEMENT;  Surgeon: Marlene Amin MD;  Location: [facility] ENDOSCOPY;  Service: Gastroenterology;  Laterality: N/A;  PRE- POSITIVE COLOGUARD  POST- HEMORRHOIDS, DIVERTICULOSIS, POLYPS, TRANSVERSE MULTILOBE FLAT POLYP NOT REMOVED- TATTOO PLACED   • DILATION AND CURETTAGE, DIAGNOSTIC / THERAPEUTIC     • TONSILLECTOMY     • TUBAL ABDOMINAL LIGATION           Current Outpatient Medications:   •  Acetaminophen (TYLENOL 8 HOUR ARTHRITIS PAIN PO), Take 650 mg by mouth Daily As Needed., Disp: , Rfl:   •  atorvastatin (LIPITOR) 20 MG tablet, TAKE 1 TABLET BY MOUTH DAILY, Disp: 90 tablet, Rfl: 1  •  busPIRone (BUSPAR) 15 MG tablet, TAKE 1 TABLET BY MOUTH THREE TIMES DAILY, Disp: 270 tablet, Rfl: 1  •  cefdinir (OMNICEF) 300 MG capsule, Take 1 capsule by mouth 2 (Two) Times a Day., Disp: 20 capsule, Rfl: 0  •  dicyclomine (BENTYL) 20 MG tablet, Take 1 tablet by mouth Every 6 (Six) Hours., Disp: 20 tablet, Rfl: 0  •  diphenhydrAMINE (BENADRYL) 25 mg capsule, Take 25 mg by mouth At Night As Needed for Sleep., Disp: , Rfl:   •  exemestane (AROMASIN) 25 MG chemo tablet, Take 1 tablet by mouth Daily., Disp: 90 tablet, Rfl: 1  •  flecainide (TAMBOCOR) 50  MG tablet, Take 50 mg by mouth 2 (Two) Times a Day., Disp: , Rfl:   •  fluconazole (Diflucan) 100 MG tablet, Take 1 tablet by mouth Daily., Disp: 7 tablet, Rfl: 0  •  gabapentin (NEURONTIN) 100 MG capsule, TAKE 3 CAPSULES BY MOUTH EVERY NIGHT, Disp: 90 capsule, Rfl: 1  •  hydroCHLOROthiazide (HYDRODIURIL) 12.5 MG tablet, Take 1 tablet by mouth Daily., Disp: 90 tablet, Rfl: 1  •  metoprolol succinate XL (TOPROL-XL) 25 MG 24 hr tablet, Take 1 tablet by mouth 2 (two) times a day. (Patient taking differently: Take 2 tablets by mouth.), Disp: 180 tablet, Rfl: 1  •  metoprolol succinate XL (TOPROL-XL) 50 MG 24 hr tablet, Take 1 tablet by mouth 2 (Two) Times a Day., Disp: , Rfl:   •  olmesartan (BENICAR) 40 MG tablet, TAKE 1 TABLET BY MOUTH DAILY, Disp: 90 tablet, Rfl: 1  •  omeprazole (priLOSEC) 20 MG capsule, TAKE 1 CAPSULE BY MOUTH DAILY, Disp: 90 capsule, Rfl: 1  •  ondansetron ODT (ZOFRAN-ODT) 4 MG disintegrating tablet, Place 1 tablet on the tongue 4 (Four) Times a Day As Needed for Nausea or Vomiting., Disp: 15 tablet, Rfl: 0  •  oxyCODONE-acetaminophen (PERCOCET) 5-325 MG per tablet, Take 1 tablet by mouth Every 6 (Six) Hours As Needed for Severe Pain., Disp: 12 tablet, Rfl: 0  •  polyethylene glycol (MiraLax) 17 GM/SCOOP powder, Take 17 g by mouth Daily. Take cap of powder with 8oz water daily surrounding surgery and while on narcotic, Disp: 119 g, Rfl: 0  •  promethazine-codeine (PHENERGAN with CODEINE) 6.25-10 MG/5ML syrup, Take 5 mL by mouth Every 4 (Four) Hours As Needed for Cough., Disp: 240 mL, Rfl: 0  •  rivaroxaban (XARELTO) 20 MG tablet, Take 20 mg by mouth Daily., Disp: , Rfl:      Allergies   Allergen Reactions   • Codeine Nausea Only   • Lisinopril Hives   • Azithromycin Unknown - Low Severity       Social History     Tobacco Use   • Smoking status: Never   • Smokeless tobacco: Never   Vaping Use   • Vaping Use: Never used   Substance Use Topics   • Alcohol use: No   • Drug use: No        Objective  "    Vital Signs:   Temp 98 °F (36.7 °C) (Temporal)   Ht 162.6 cm (64\")   Wt 117 kg (258 lb)   BMI 44.29 kg/m²       Physical Exam    General: Well developed, well nourished patient of stated age in no acute distress. Voice is strong and clear.   Head: Normocephalic and atraumatic.  Face: No lesions.  Bilateral parotid and submandibular glands are unremarkable.  Stensen's and Warthin's ducts are productive of clear saliva bilaterally.  House-Brackmann I/VI     bilaterally.   muscles and temporomandibular joint nontender to palpation.  No TMJ crepitus.  Eyes: PERRLA, sclerae anicteric, no conjunctival injection. Extraocular movements are intact and full. No nystagmus.   Ears: Auricles are normal in appearance. Bilateral external auditory canals are unremarkable.  Right tympanic membrane is unremarkable.  Left tympanic membrane is slightly retracted.  Hearing normal to conversational voice.   Nose: External nose is normal in appearance.  Right nasal cavity is unremarkable.  Left nasal cavity with active bleeding.  This was examined and suctioned free of blood revealing bleeding from the inferior edge of the previously cauterized site.  This was cauterized repeatedly with silver nitrate.  No lesions.   Oral Cavity: Lips are normal in appearance. Oral mucosa is unremarkable. Gingiva is unremarkable.  Partial dentition for age. Tongue is unremarkable with good movement. Hard palate is unremarkable.   Oropharynx: Soft palate is unremarkable with full movement. Uvula is unremarkable. Bilateral tonsils are surgically absent. Posterior oropharynx is unremarkable.    Larynx and hypopharynx: Deferred secondary to gag reflex.  Neck: Supple.  No mass.  Nontender to palpation.  Trachea midline. Thyroid normal size and without nodules to palpation.   Lymphatic: No lymphadenopathy upon palpation.   Psychiatric: Appropriate affect, cooperative   Neurologic: Oriented x 3, strength symmetric in all extremities, Cranial " Nerves II-XII are grossly intact to confrontation   Skin: Warm and dry. No rashes.    Procedures       Result Review :               Assessment and Plan    There are no diagnoses linked to this encounter.Impressions and findings were discussed at great length.  Unfortunately, she developed left-sided epistaxis this morning after cauterization 2 days ago.  The bleeding area today was slightly more inferior than it was 2 days ago.  Hemostasis was obtained with silver nitrate cauterization we again discussed post cauterization care.  She will follow-up as previously scheduled or sooner if needed.    Follow Up   No follow-ups on file.  Patient was given instructions and counseling regarding her condition or for health maintenance advice. Please see specific information pulled into the AVS if appropriate.

## 2022-10-31 DIAGNOSIS — C50.512 MALIGNANT NEOPLASM OF LOWER-OUTER QUADRANT OF LEFT BREAST OF FEMALE, ESTROGEN RECEPTOR POSITIVE: ICD-10-CM

## 2022-10-31 DIAGNOSIS — Z17.0 MALIGNANT NEOPLASM OF LOWER-OUTER QUADRANT OF LEFT BREAST OF FEMALE, ESTROGEN RECEPTOR POSITIVE: ICD-10-CM

## 2022-10-31 RX ORDER — EXEMESTANE 25 MG/1
25 TABLET ORAL DAILY
Qty: 90 TABLET | Refills: 1 | Status: SHIPPED | OUTPATIENT
Start: 2022-10-31 | End: 2023-02-03 | Stop reason: SDUPTHER

## 2022-11-03 ENCOUNTER — TELEPHONE (OUTPATIENT)
Dept: INTERNAL MEDICINE | Facility: CLINIC | Age: 72
End: 2022-11-03

## 2022-11-03 RX ORDER — TRAZODONE HYDROCHLORIDE 50 MG/1
TABLET ORAL
Qty: 60 TABLET | Refills: 0 | Status: SHIPPED | OUTPATIENT
Start: 2022-11-03 | End: 2022-12-09

## 2022-11-03 NOTE — TELEPHONE ENCOUNTER
Tell her I sent in something to help her sleep that will also calm her nerves during the day.  She can still take the buspirone as needed.

## 2022-11-03 NOTE — TELEPHONE ENCOUNTER
Called spoke with PT, she is taking the buspar but it does not work like it use to.  She take 45mg total daily.   She unable to sleep due to being anxious about having another nose bleed.  She just feels exhausted.  And said that she is having panic attacks daily

## 2022-11-03 NOTE — TELEPHONE ENCOUNTER
Caller: Helena Romero    Relationship: Self    Best call back number: [phone]       What was the call regarding: PATIENT IS CALLING TO SEE WHAT SHE NEEDS TO DO ABOUT HER NERVES GETTING BAD AND HER HAVING TROUBLE SLEEPING DUE TO HER RECENT MEDICAL ISSUES WITH THE NOSE BLEEDS.   PATIENT WOULD LIKE TO KNOW IF SHE NEEDS TO CONTINUE THE busPIRone OR WHAT DOES SHE NEED TO DO.       Do you require a callback: YES

## 2022-11-30 RX ORDER — OMEPRAZOLE 20 MG/1
20 CAPSULE, DELAYED RELEASE ORAL DAILY
Qty: 90 CAPSULE | Refills: 1 | Status: SHIPPED | OUTPATIENT
Start: 2022-11-30

## 2022-12-09 RX ORDER — TRAZODONE HYDROCHLORIDE 50 MG/1
TABLET ORAL
Qty: 60 TABLET | Refills: 3 | Status: SHIPPED | OUTPATIENT
Start: 2022-12-09

## 2022-12-12 DIAGNOSIS — M19.91 PRIMARY OSTEOARTHRITIS, UNSPECIFIED SITE: ICD-10-CM

## 2022-12-12 RX ORDER — GABAPENTIN 100 MG/1
300 CAPSULE ORAL NIGHTLY
Qty: 90 CAPSULE | Refills: 1 | Status: SHIPPED | OUTPATIENT
Start: 2022-12-12 | End: 2023-03-13

## 2023-01-24 ENCOUNTER — HOSPITAL ENCOUNTER (OUTPATIENT)
Dept: BONE DENSITY | Facility: HOSPITAL | Age: 73
Discharge: HOME | End: 2023-01-24
Admitting: INTERNAL MEDICINE
Payer: MEDICARE

## 2023-01-24 DIAGNOSIS — Z78.0 POST-MENOPAUSAL: ICD-10-CM

## 2023-01-24 PROCEDURE — 77080 DXA BONE DENSITY AXIAL: CPT

## 2023-01-24 RX ORDER — OLMESARTAN MEDOXOMIL 40 MG/1
40 TABLET ORAL DAILY
Qty: 90 TABLET | Refills: 1 | Status: SHIPPED | OUTPATIENT
Start: 2023-01-24

## 2023-02-03 ENCOUNTER — OFFICE VISIT (OUTPATIENT)
Dept: ONCOLOGY | Facility: HOSPITAL | Age: 73
End: 2023-02-03
Payer: MEDICARE

## 2023-02-03 VITALS
HEART RATE: 67 BPM | WEIGHT: 251.32 LBS | HEIGHT: 64 IN | OXYGEN SATURATION: 99 % | DIASTOLIC BLOOD PRESSURE: 97 MMHG | TEMPERATURE: 96.8 F | BODY MASS INDEX: 42.91 KG/M2 | SYSTOLIC BLOOD PRESSURE: 146 MMHG | RESPIRATION RATE: 18 BRPM

## 2023-02-03 DIAGNOSIS — C50.512 MALIGNANT NEOPLASM OF LOWER-OUTER QUADRANT OF LEFT BREAST OF FEMALE, ESTROGEN RECEPTOR POSITIVE: ICD-10-CM

## 2023-02-03 DIAGNOSIS — C50.919 MALIGNANT NEOPLASM OF FEMALE BREAST, UNSPECIFIED ESTROGEN RECEPTOR STATUS, UNSPECIFIED LATERALITY, UNSPECIFIED SITE OF BREAST: ICD-10-CM

## 2023-02-03 DIAGNOSIS — J30.2 SEASONAL ALLERGIC RHINITIS, UNSPECIFIED TRIGGER: ICD-10-CM

## 2023-02-03 DIAGNOSIS — Z12.31 ENCOUNTER FOR SCREENING MAMMOGRAM FOR MALIGNANT NEOPLASM OF BREAST: Primary | ICD-10-CM

## 2023-02-03 DIAGNOSIS — M85.80 OSTEOPENIA, UNSPECIFIED LOCATION: ICD-10-CM

## 2023-02-03 DIAGNOSIS — Z17.0 MALIGNANT NEOPLASM OF LOWER-OUTER QUADRANT OF LEFT BREAST OF FEMALE, ESTROGEN RECEPTOR POSITIVE: ICD-10-CM

## 2023-02-03 PROBLEM — Z51.81 ENCOUNTER FOR MONITORING FLECAINIDE THERAPY: Status: ACTIVE | Noted: 2022-10-21

## 2023-02-03 PROBLEM — K63.5 POLYP OF COLON: Status: ACTIVE | Noted: 2023-02-03

## 2023-02-03 PROBLEM — Z79.899 ENCOUNTER FOR MONITORING FLECAINIDE THERAPY: Status: ACTIVE | Noted: 2022-10-21

## 2023-02-03 PROBLEM — K56.609 INTESTINAL OBSTRUCTION (HCC): Status: ACTIVE | Noted: 2023-02-03

## 2023-02-03 PROBLEM — Z79.01 CHRONIC ANTICOAGULATION: Status: ACTIVE | Noted: 2022-10-21

## 2023-02-03 PROCEDURE — 99214 OFFICE O/P EST MOD 30 MIN: CPT | Performed by: INTERNAL MEDICINE

## 2023-02-03 PROCEDURE — G0463 HOSPITAL OUTPT CLINIC VISIT: HCPCS | Performed by: INTERNAL MEDICINE

## 2023-02-03 RX ORDER — DOXYCYCLINE HYCLATE 100 MG/1
CAPSULE ORAL
COMMUNITY
Start: 2022-10-27

## 2023-02-03 RX ORDER — EXEMESTANE 25 MG/1
25 TABLET ORAL DAILY
Qty: 90 TABLET | Refills: 1 | Status: SHIPPED | OUTPATIENT
Start: 2023-02-03

## 2023-02-03 NOTE — PROGRESS NOTES
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  ER/ME positive at 95%, HER-2 negative (0 by IHC), Ki-67 approximately 5%.  -Consult slide review at [facility] on 9/17/2020 showed essentially identical results.  -11/5/2020: Lumpectomy with sentinel lymph node biopsy on the left.  There is a 12 mm area of invasive mucinous carcinoma and a 6 mm area of low-grade DCIS.  Margins negative.  -Completed radiation therapy (Dr. Goldman)  -[facility] at [facility] was negative per patient report.  -Anastrozole started 12/3/2020 - stopped due to leg swelling  --switched to exemestane in August 2021      Malignant neoplasm of lower-outer quadrant of left breast of female, estrogen receptor positive (HCC)   9/30/2020 Initial Diagnosis    Malignant neoplasm of lower-outer quadrant of left breast of female, estrogen receptor positive (CMS/HCC)         MARLEN Malik \"Juliana\" Nick is a 72-year-old female who presents today for follow up.    The patient is doing well overall. She has been compliant with taking exemestane as prescribed. She denies any side effects and requests a refill. She noticed that a couple of her teeth have chipped and she plans to have them evaluated with a dentist. The patient will be due for a mammogram on 08/01/2023. She was previously seen by Dr. Cele Valladares at the [facility] for colon resection and polyp removal.     She reports symptoms of asthma, epistaxis, and atrial fibrillation. She tried using a cool mist " humidifier, Vaseline, and a saline nasal spray for relief of epistaxis. She admits to increased allergic rhinitis during the winter season. She notes a history of taking Mucinex with relief.    She recently completed a DEXA scan on 01/24/2023 that noted osteopenia, similar to prior results. She is not taking vitamin D or calcium supplements daily.    Review of Systems   Constitutional: Negative for appetite change, diaphoresis, fatigue, fever, unexpected weight gain and unexpected weight loss.   HENT: Positive for congestion and nosebleeds. Negative for hearing loss, mouth sores, sore throat, swollen glands, trouble swallowing and voice change.         Positive for environmental allergies.    Eyes: Negative for blurred vision.   Respiratory: Negative for cough, shortness of breath and wheezing.         Positive for asthma   Cardiovascular: Negative for chest pain and palpitations.        Positive for atrial fibrillation.   Gastrointestinal: Negative for abdominal pain, blood in stool, constipation, diarrhea, nausea and vomiting.   Endocrine: Negative for cold intolerance and heat intolerance.   Genitourinary: Negative for difficulty urinating, dysuria, frequency, hematuria and urinary incontinence.   Musculoskeletal: Negative for arthralgias, back pain and myalgias.   Skin: Negative for rash, skin lesions and wound.   Neurological: Negative for dizziness, seizures, weakness, numbness and headache.   Hematological: Does not bruise/bleed easily.   Psychiatric/Behavioral: Negative for depressed mood. The patient is not nervous/anxious.    All other systems reviewed and are negative.      Past Medical History:   Diagnosis Date   • Acute bronchitis    • Allergic    • Anesthesia complication     PATIENT STAYS SLEEPY A LONGTIME AFTER SURGERY   • Anxiety    • Asthma    • Atrial fibrillation (HCC)    • Borderline hyperglycemia    • Breast cancer (HCC)    • Cat bite    • Depression    • Diarrhea    • GERD (gastroesophageal  reflux disease)    • Headache    • Hypertension    • Hypocalcemia    • Insomnia    • Low back pain    • Low back pain, episodic    • Need for diphtheria-tetanus-pertussis (Tdap) vaccine    • Neuromuscular disorder (HCC)    • Nosebleed    • Obesity    • Osteoarthritis, generalized    • Panic attack    • Pneumonia     x 3   • Seasonal allergies    • Sleep disturbances    • Trochanteric bursitis    • Vaginal candidiasis    • Yeast infection      Past Surgical History:   Procedure Laterality Date   • BREAST BIOPSY Left 2020    MALIGNANT   • BREAST LUMPECTOMY WITH SENTINEL NODE BIOPSY Left 11/5/2020    Procedure: Left breast charley localized lumpectomy and left axillary sentinel lymph node biopsy;  Surgeon: Inés Jones MD;  Location: [facility] MAIN OR;  Service: General;  Laterality: Left;   • CERVICAL CONIZATION, LEEP     • CHOLECYSTECTOMY     • COLONOSCOPY  approx 2011    normal per patient-repeat 10 years   • COLONOSCOPY N/A 6/17/2022    Procedure: COLONOSCOPY INTO CECUM AND T.I. WITH COLD SNARE AND COLD BIOPSY POLYPECTOMIES AND 2CC TATTOO PLACEMENT;  Surgeon: Marlene Amin MD;  Location: [facility] ENDOSCOPY;  Service: Gastroenterology;  Laterality: N/A;  PRE- POSITIVE COLOGUARD  POST- HEMORRHOIDS, DIVERTICULOSIS, POLYPS, TRANSVERSE MULTILOBE FLAT POLYP NOT REMOVED- TATTOO PLACED   • DILATION AND CURETTAGE, DIAGNOSTIC / THERAPEUTIC     • TONSILLECTOMY     • TUBAL ABDOMINAL LIGATION       Social History     Socioeconomic History   • Marital status:    Tobacco Use   • Smoking status: Never   • Smokeless tobacco: Never   Vaping Use   • Vaping Use: Never used   Substance and Sexual Activity   • Alcohol use: No   • Drug use: No   • Sexual activity: Defer     Family History   Problem Relation Age of Onset   • Pancreatic cancer Paternal Aunt    • Cervical cancer Mother 41   • Cancer Mother         Cervical Cancer   • Melanoma Sister 60   • Thyroid cancer Sister 60   • Breast cancer Niece 38        TRIPLE -   •  "Malig Hyperthermia Neg Hx        Objective   Physical Exam  Vitals and nursing note reviewed.   Constitutional:       General: She is not in acute distress.  Eyes:      General: No scleral icterus.     Conjunctiva/sclera: Conjunctivae normal.   Cardiovascular:      Rate and Rhythm: Normal rate and regular rhythm.      Heart sounds: No murmur heard.    No gallop.   Pulmonary:      Effort: Pulmonary effort is normal.      Breath sounds: Normal breath sounds. No decreased breath sounds, wheezing, rhonchi or rales.   Abdominal:      General: Bowel sounds are normal. There is no distension.      Palpations: Abdomen is soft.      Tenderness: There is no abdominal tenderness.   Musculoskeletal:         General: No swelling or signs of injury.      Right lower leg: No edema.      Left lower leg: No edema.   Skin:     General: Skin is warm and dry.      Coloration: Skin is not jaundiced or pale.      Findings: No lesion or rash.   Neurological:      General: No focal deficit present.      Mental Status: She is alert and oriented to person, place, and time. Mental status is at baseline.      Cranial Nerves: No cranial nerve deficit.      Motor: No weakness.      Gait: Gait normal.   Psychiatric:         Attention and Perception: Attention normal.         Mood and Affect: Mood normal.         Behavior: Behavior normal. Behavior is cooperative.         Thought Content: Thought content normal.           Vitals:    02/03/23 1359   BP: 146/97   Pulse: 67   Resp: 18   Temp: 96.8 °F (36 °C)   SpO2: 99%   Weight: 114 kg (251 lb 5.2 oz)   Height: 162.6 cm (64.02\")   PainSc: 0-No pain     ECOG score: 0         PHQ-9 Total Score:         Result Review :   The following data was reviewed by: Ludy Cartwright MD PhD on 02/03/2023:  Lab Results   Component Value Date    HGB 13.1 09/13/2022    HCT 41.1 09/13/2022    MCV 88.0 09/13/2022     09/13/2022    WBC 7.71 09/13/2022    NEUTROABS 5.85 09/13/2022    LYMPHSABS 1.16 09/13/2022    " MONOSABS 0.46 09/13/2022    EOSABS 0.17 09/13/2022    BASOSABS 0.05 09/13/2022     Lab Results   Component Value Date    GLUCOSE 129 (H) 09/13/2022    BUN 7 (L) 09/13/2022    CREATININE 0.87 09/13/2022     09/13/2022    K 3.7 09/13/2022     09/13/2022    CO2 27.0 09/13/2022    CALCIUM 9.5 09/13/2022    PROTEINTOT 7.6 09/13/2022    ALBUMIN 4.60 09/13/2022    BILITOT 0.4 09/13/2022    ALKPHOS 133 (H) 09/13/2022    AST 21 09/13/2022    ALT 20 09/13/2022          Assessment and Plan    Diagnoses and all orders for this visit:    1. Encounter for screening mammogram for malignant neoplasm of breast (Primary)  -     Mammo screening digital tomosynthesis bilateral w CAD; Future    2. Malignant neoplasm of female breast, unspecified estrogen receptor status, unspecified laterality, unspecified site of breast (HCC)    3. Malignant neoplasm of lower-outer quadrant of left breast of female, estrogen receptor positive (HCC)  -     exemestane (AROMASIN) 25 MG chemo tablet; Take 1 tablet by mouth Daily.  Dispense: 90 tablet; Refill: 1    4. Osteopenia, unspecified location    5. Seasonal allergic rhinitis, unspecified trigger      1. Malignant neoplasm of female breast  Mucinous breast cancer subtype. Patient is currently tolerating exemestane well.  I will send a refill for 90-day supply. The patient will be due for mammogram in 08/01/2023.     2. Osteopenia  She completed a DEXA scan on 01/24/2023 that noted osteopenia. She was advised to take vitamin D and calcium supplements, and voiced willingness to comply.    3. Allergic rhinitis  The patient was advised she can use Mucinex to help with nasal congestion. We discussed she can trial regular use of an over-the-counter antihistamine like Allegra, Claritin, or Zyrtec, and Benadryl can be used for flares.          Patient was given instructions and counseling regarding her condition or for health maintenance advice. Please see specific information pulled into the AVS  if appropriate.       Transcribed from ambient dictation for Ludy Cartwright MD PhD by Saba Watson.  02/03/23   13:54 EST    Patient or patient representative verbalized consent to the visit recording.  I have personally performed the services described in this document as transcribed by the above individual, and it is both accurate and complete.

## 2023-02-23 ENCOUNTER — TELEPHONE (OUTPATIENT)
Dept: INTERNAL MEDICINE | Facility: CLINIC | Age: 73
End: 2023-02-23

## 2023-02-23 NOTE — TELEPHONE ENCOUNTER
Caller: Helena Romero    Relationship: Self    Best call back number: [phone]    What was the call regarding: PATIENT IS ASKING WHEN SHE IS DUE TO COME BACK IN FOR A VISIT FOR HER MEDICATION REFILLS. PLEASE ADVISE.    Do you require a callback: YES

## 2023-02-27 PROBLEM — M85.80 OSTEOPENIA: Status: ACTIVE | Noted: 2023-02-27

## 2023-02-27 PROBLEM — J30.9 ALLERGIC RHINITIS: Status: ACTIVE | Noted: 2023-02-27

## 2023-02-28 RX ORDER — ATORVASTATIN CALCIUM 20 MG/1
20 TABLET, FILM COATED ORAL DAILY
Qty: 90 TABLET | Refills: 1 | Status: SHIPPED | OUTPATIENT
Start: 2023-02-28

## 2023-03-13 DIAGNOSIS — M19.91 PRIMARY OSTEOARTHRITIS, UNSPECIFIED SITE: ICD-10-CM

## 2023-03-13 RX ORDER — GABAPENTIN 100 MG/1
300 CAPSULE ORAL NIGHTLY
Qty: 90 CAPSULE | Refills: 1 | Status: SHIPPED | OUTPATIENT
Start: 2023-03-13

## 2023-04-20 DIAGNOSIS — C50.512 MALIGNANT NEOPLASM OF LOWER-OUTER QUADRANT OF LEFT BREAST OF FEMALE, ESTROGEN RECEPTOR POSITIVE: ICD-10-CM

## 2023-04-20 DIAGNOSIS — Z17.0 MALIGNANT NEOPLASM OF LOWER-OUTER QUADRANT OF LEFT BREAST OF FEMALE, ESTROGEN RECEPTOR POSITIVE: ICD-10-CM

## 2023-04-21 RX ORDER — EXEMESTANE 25 MG/1
25 TABLET ORAL DAILY
Qty: 90 TABLET | Refills: 1 | Status: SHIPPED | OUTPATIENT
Start: 2023-04-21

## 2023-05-04 ENCOUNTER — TELEPHONE (OUTPATIENT)
Dept: INTERNAL MEDICINE | Facility: CLINIC | Age: 73
End: 2023-05-04
Payer: MEDICARE

## 2023-05-04 RX ORDER — ALBUTEROL SULFATE 90 UG/1
2 AEROSOL, METERED RESPIRATORY (INHALATION) EVERY 4 HOURS PRN
Qty: 18 G | Refills: 1 | Status: SHIPPED | OUTPATIENT
Start: 2023-05-04

## 2023-05-04 NOTE — TELEPHONE ENCOUNTER
"Caller: Helena Romero \"Juliana\"    Relationship: Self    Best call back number: [phone]    What medication are you requesting: RESCUE INHALER    What are your current symptoms: SHORTNESS OF BREATH AFTER WORKING OUTSIDE.    How long have you been experiencing symptoms: TODAY, THIS TIME    Have you had these symptoms before:    [x] Yes  [] No    Have you been treated for these symptoms before:   [x] Yes  [] No    If a prescription is needed, what is your preferred pharmacy and phone number:    [id number] - [place], [place] - [address]  AT [facility] [address] - [phone] PH - [phone] FX  P: [phone]  F: [phone]      Additional notes:PATIENT PREVIOUSLY HAD RESCUE INHALER BUT FOUND THAT SHE NO LONGER HAD ONE AFTER COMING IN FROM WORKING OUT IN [place] TODAY. REQUESTED REFILL.    SHE WAS SENT TO  FOR ADVISEMENT [facility].        "

## 2023-05-04 NOTE — TELEPHONE ENCOUNTER
Patient has been out in her yard working and noticed she has no rescue inhaler, patient states she is having trouble breathing at night, please send in refill to [facility] [phone].

## 2023-05-10 RX ORDER — TRAZODONE HYDROCHLORIDE 50 MG/1
TABLET ORAL
Qty: 60 TABLET | Refills: 3 | Status: SHIPPED | OUTPATIENT
Start: 2023-05-10

## 2023-05-30 RX ORDER — OMEPRAZOLE 20 MG/1
20 CAPSULE, DELAYED RELEASE ORAL DAILY
Qty: 90 CAPSULE | Refills: 1 | Status: SHIPPED | OUTPATIENT
Start: 2023-05-30

## 2023-06-08 RX ORDER — BUSPIRONE HYDROCHLORIDE 15 MG/1
TABLET ORAL
Qty: 270 TABLET | Refills: 1 | Status: SHIPPED | OUTPATIENT
Start: 2023-06-08

## 2023-06-12 DIAGNOSIS — M19.91 PRIMARY OSTEOARTHRITIS, UNSPECIFIED SITE: ICD-10-CM

## 2023-06-12 RX ORDER — GABAPENTIN 100 MG/1
300 CAPSULE ORAL NIGHTLY
Qty: 90 CAPSULE | Refills: 1 | Status: SHIPPED | OUTPATIENT
Start: 2023-06-12

## 2023-07-24 RX ORDER — OLMESARTAN MEDOXOMIL 40 MG/1
40 TABLET ORAL DAILY
Qty: 90 TABLET | Refills: 1 | Status: SHIPPED | OUTPATIENT
Start: 2023-07-24

## 2023-07-25 ENCOUNTER — OFFICE VISIT (OUTPATIENT)
Dept: INTERNAL MEDICINE | Facility: CLINIC | Age: 73
End: 2023-07-25
Payer: MEDICARE

## 2023-07-25 DIAGNOSIS — C50.512 MALIGNANT NEOPLASM OF LOWER-OUTER QUADRANT OF LEFT BREAST OF FEMALE, ESTROGEN RECEPTOR POSITIVE: ICD-10-CM

## 2023-07-25 DIAGNOSIS — I48.0 PAROXYSMAL ATRIAL FIBRILLATION: ICD-10-CM

## 2023-07-25 DIAGNOSIS — I10 ESSENTIAL HYPERTENSION: ICD-10-CM

## 2023-07-25 DIAGNOSIS — Z00.00 MEDICARE ANNUAL WELLNESS VISIT, SUBSEQUENT: ICD-10-CM

## 2023-07-25 DIAGNOSIS — Z17.0 MALIGNANT NEOPLASM OF LOWER-OUTER QUADRANT OF LEFT BREAST OF FEMALE, ESTROGEN RECEPTOR POSITIVE: ICD-10-CM

## 2023-07-25 DIAGNOSIS — Z79.01 CHRONIC ANTICOAGULATION: ICD-10-CM

## 2023-07-25 DIAGNOSIS — E78.49 OTHER HYPERLIPIDEMIA: Primary | ICD-10-CM

## 2023-07-25 PROBLEM — R87.619 ABNORMAL PAP SMEAR: Status: RESOLVED | Noted: 2021-07-22 | Resolved: 2023-07-25

## 2023-07-25 PROBLEM — R19.5 POSITIVE COLORECTAL CANCER SCREENING USING COLOGUARD TEST: Status: RESOLVED | Noted: 2022-04-12 | Resolved: 2023-07-25

## 2023-07-25 PROBLEM — IMO0002 ABNORMAL PAP SMEAR: Status: RESOLVED | Noted: 2021-07-22 | Resolved: 2023-07-25

## 2023-07-25 NOTE — PROGRESS NOTES
The ABCs of the Annual Wellness Visit  Subsequent Medicare Wellness Visit    Subjective    Helena Romero is a 73 y.o. female who presents for a Subsequent Medicare Wellness Visit.    The following portions of the patient's history were reviewed and   updated as appropriate: allergies, current medications, past family history, past medical history, past social history, past surgical history, and problem list.    Compared to one year ago, the patient feels her physical   health is better.    Compared to one year ago, the patient feels her mental   health is the same.    Recent Hospitalizations:  She was not admitted to the hospital during the last year.       Current Medical Providers:  Patient Care Team:  Ally Lagos MD as PCP - General (Internal Medicine)  Kyle Prince MD as Consulting Physician (Cardiology)  Inés Jones MD as Surgeon (Breast Surgery)  Ludy Cartwright MD PhD as Consulting Physician (Hematology and Oncology)  Latesha Maldonado APRN (Nurse Practitioner)  Manny Sheets MD as Consulting Physician (Otolaryngology)    Outpatient Medications Prior to Visit   Medication Sig Dispense Refill    Acetaminophen (TYLENOL 8 HOUR ARTHRITIS PAIN PO) Take 650 mg by mouth Daily As Needed.      albuterol sulfate  (90 Base) MCG/ACT inhaler Inhale 2 puffs Every 4 (Four) Hours As Needed for Wheezing. 18 g 1    atorvastatin (LIPITOR) 20 MG tablet TAKE 1 TABLET BY MOUTH DAILY 90 tablet 1    busPIRone (BUSPAR) 15 MG tablet TAKE 1 TABLET BY MOUTH THREE TIMES DAILY 270 tablet 1    diphenhydrAMINE (BENADRYL) 25 mg capsule Take 1 capsule by mouth At Night As Needed for Sleep.      exemestane (AROMASIN) 25 MG tablet Take 1 tablet by mouth Daily. 90 tablet 1    flecainide (TAMBOCOR) 50 MG tablet Take 1 tablet by mouth 2 (Two) Times a Day.      gabapentin (NEURONTIN) 100 MG capsule Take 3 capsules by mouth Every Night. 90 capsule 1    hydroCHLOROthiazide (HYDRODIURIL) 12.5 MG tablet  Take 1 tablet by mouth Daily. 90 tablet 1    metoprolol succinate XL (TOPROL-XL) 50 MG 24 hr tablet Take 1 tablet by mouth 2 (Two) Times a Day.      mupirocin (BACTROBAN) 2 % ointment APPLY INSIDE NOSTRILS AS DIRECTED TWICE DAILY      olmesartan (BENICAR) 40 MG tablet TAKE 1 TABLET BY MOUTH DAILY 90 tablet 1    omeprazole (priLOSEC) 20 MG capsule TAKE 1 CAPSULE BY MOUTH DAILY 90 capsule 1    polyethylene glycol (MiraLax) 17 GM/SCOOP powder Take 17 g by mouth Daily. Take cap of powder with 8oz water daily surrounding surgery and while on narcotic 119 g 0    rivaroxaban (XARELTO) 20 MG tablet Take 1 tablet by mouth Daily.      traZODone (DESYREL) 50 MG tablet TAKE 1 TO 2 TABLETS BY MOUTH EVERY NIGHT AT BEDTIME AS NEEDED 60 tablet 3    cefdinir (OMNICEF) 300 MG capsule Take 1 capsule by mouth 2 (Two) Times a Day. 20 capsule 0    dicyclomine (BENTYL) 20 MG tablet Take 1 tablet by mouth Every 6 (Six) Hours. 20 tablet 0    doxycycline (VIBRAMYCIN) 100 MG capsule TAKE ONE CAPSULE BY MOUTH ONCE PER DAY FOR THREE DAYS      fluconazole (Diflucan) 100 MG tablet Take 1 tablet by mouth Daily. 7 tablet 0    ondansetron ODT (ZOFRAN-ODT) 4 MG disintegrating tablet Place 1 tablet on the tongue 4 (Four) Times a Day As Needed for Nausea or Vomiting. 15 tablet 0    oxyCODONE-acetaminophen (PERCOCET) 5-325 MG per tablet Take 1 tablet by mouth Every 6 (Six) Hours As Needed for Severe Pain. 12 tablet 0    promethazine-codeine (PHENERGAN with CODEINE) 6.25-10 MG/5ML syrup Take 5 mL by mouth Every 4 (Four) Hours As Needed for Cough. 240 mL 0    metoprolol succinate XL (TOPROL-XL) 25 MG 24 hr tablet Take 1 tablet by mouth 2 (two) times a day. (Patient taking differently: Take 50 mg by mouth.) 180 tablet 1     No facility-administered medications prior to visit.       No opioid medication identified on active medication list. I have reviewed chart for other potential  high risk medication/s and harmful drug interactions in the  "elderly.        Aspirin is not on active medication list.  Aspirin use is not indicated based on review of current medical condition/s. Risk of harm outweighs potential benefits.  .    Patient Active Problem List   Diagnosis    Essential hypertension    Other hyperlipidemia    Mitral insufficiency    Obesity    Functional diarrhea    History of small bowel obstruction    Gastroesophageal reflux disease    Paroxysmal atrial fibrillation    Intestinal obstruction    Chronic anticoagulation    Encounter for monitoring flecainide therapy    Polyp of colon    Osteopenia    Allergic rhinitis     Advance Care Planning   Advance Care Planning     Advance Directive is not on file.  ACP discussion was held with the patient during this visit. Patient has an advance directive (not in EMR), copy requested.     Objective    Vitals:    07/25/23 1507   BP: 132/78   Pulse: 76   Weight: 110 kg (242 lb)   Height: 162.6 cm (64.02\")     Estimated body mass index is 41.51 kg/m² as calculated from the following:    Height as of this encounter: 162.6 cm (64.02\").    Weight as of this encounter: 110 kg (242 lb).    Class 3 Severe Obesity (BMI >=40). Obesity-related health conditions include the following: hypertension, dyslipidemias, and osteoarthritis. Obesity is improving with lifestyle modifications. BMI is is above average; BMI management plan is completed. We discussed portion control and increasing exercise.      Does the patient have evidence of cognitive impairment? No    Lab Results   Component Value Date    CHLPL 139 07/25/2023    TRIG 111 07/25/2023    HDL 61 (H) 07/25/2023    LDL 58 07/25/2023    VLDL 20 07/25/2023        HEALTH RISK ASSESSMENT    Smoking Status:  Social History     Tobacco Use   Smoking Status Never   Smokeless Tobacco Never     Alcohol Consumption:  Social History     Substance and Sexual Activity   Alcohol Use No     Fall Risk Screen:    STEADI Fall Risk Assessment was completed, and patient is at LOW risk " for falls.Assessment completed on:2023    Depression Screenin/25/2023     3:11 PM   PHQ-2/PHQ-9 Depression Screening   Little Interest or Pleasure in Doing Things 0-->not at all   Feeling Down, Depressed or Hopeless 0-->not at all   PHQ-9: Brief Depression Severity Measure Score 0       Health Habits and Functional and Cognitive Screenin/25/2023     3:10 PM   Functional & Cognitive Status   Do you have difficulty preparing food and eating? No   Do you have difficulty bathing yourself, getting dressed or grooming yourself? No   Do you have difficulty using the toilet? No   Do you have difficulty moving around from place to place? No   Do you have trouble with steps or getting out of a bed or a chair? No   Current Diet Well Balanced Diet   Dental Exam Up to date   Eye Exam Up to date   Exercise (times per week) 7 times per week   Current Exercises Include Walking   Do you need help using the phone?  No   Are you deaf or do you have serious difficulty hearing?  No   Do you need help to go to places out of walking distance? No   Do you need help shopping? No   Do you need help preparing meals?  No   Do you need help with housework?  No   Do you need help with laundry? No   Do you need help taking your medications? No   Do you need help managing money? No   Do you ever drive or ride in a car without wearing a seat belt? No   Have you felt unusual stress, anger or loneliness in the last month? No   Who do you live with? Spouse   If you need help, do you have trouble finding someone available to you? No   Have you been bothered in the last four weeks by sexual problems? No   Do you have difficulty concentrating, remembering or making decisions? No       Age-appropriate Screening Schedule:  Refer to the list below for future screening recommendations based on patient's age, sex and/or medical conditions. Orders for these recommended tests are listed in the plan section. The patient has been provided  "with a written plan.    Health Maintenance   Topic Date Due    ZOSTER VACCINE (1 of 2) Never done    HEPATITIS C SCREENING  Never done    ANNUAL WELLNESS VISIT  02/28/2023    COVID-19 Vaccine (6 - Moderna series) 03/14/2023    MAMMOGRAM  07/29/2023    INFLUENZA VACCINE  10/01/2023    LIPID PANEL  07/25/2024    DXA SCAN  01/24/2025    TDAP/TD VACCINES (2 - Td or Tdap) 06/26/2027    COLORECTAL CANCER SCREENING  08/15/2032    Pneumococcal Vaccine 65+  Completed                  CMS Preventative Services Quick Reference  Risk Factors Identified During Encounter  Immunizations Discussed/Encouraged: Shingrix  The above risks/problems have been discussed with the patient.  Pertinent information has been shared with the patient in the After Visit Summary.  An After Visit Summary and PPPS were made available to the patient.    Follow Up:   Next Medicare Wellness visit to be scheduled in 1 year.       Additional E&M Note during same encounter follows:  Patient has multiple medical problems which are significant and separately identifiable that require additional work above and beyond the Medicare Wellness Visit.      Chief Complaint  Medicare Wellness-subsequent    Subjective        HPI  Helena Romero is also being seen today for eating better and walking several times a day.   She has been feeling better- hasn't needed abx lately.  She does get coughing but an inhaler a couple of times helps.   Does have some joint aches with aromasin but does take it regularly.   Has repeat c-scope in Sept due to precancerous lesions on last scope.        Objective   Vital Signs:  /78   Pulse 76   Ht 162.6 cm (64.02\")   Wt 110 kg (242 lb)   BMI 41.51 kg/m²     Physical Exam  Constitutional:       General: She is not in acute distress.     Appearance: Normal appearance.   Cardiovascular:      Rate and Rhythm: Normal rate. Rhythm irregular.   Pulmonary:      Effort: Pulmonary effort is normal.      Breath sounds: Normal breath " sounds.   Chest:   Breasts:     Right: Normal.      Left: Normal.   Abdominal:      General: Bowel sounds are normal.   Musculoskeletal:      Right lower leg: No edema.      Left lower leg: No edema.   Lymphadenopathy:      Cervical: No cervical adenopathy.   Skin:     General: Skin is warm and dry.        The following data was reviewed by: Ally Lagos MD on 07/25/2023:  Common labs          8/1/2022    11:58 9/13/2022    10:55 7/25/2023    15:47   Common Labs   Glucose 92  129  99    BUN 8  7  7    Creatinine 0.96  0.87  0.95    Sodium 140  142  141    Potassium 3.9  3.7  4.1    Chloride 102  105  102    Calcium 9.7  9.5  9.6    Total Protein   6.6    Albumin 4.52  4.60  4.7    Total Bilirubin 0.6  0.4  0.4    Alkaline Phosphatase 127  133  143    AST (SGOT) 24  21  20    ALT (SGPT) 24  20  23    WBC 7.52  7.71     Hemoglobin 13.1  13.1     Hematocrit 41.8  41.1     Platelets 277  286     Total Cholesterol   139    Triglycerides   111    HDL Cholesterol   61    LDL Cholesterol    58      Data reviewed : Consultant notes Cards           Assessment and Plan   Diagnoses and all orders for this visit:    1. Other hyperlipidemia (Primary)  Comments:  check labs- have been great  Orders:  -     Comprehensive Metabolic Panel  -     Lipid Panel With / Chol / HDL Ratio  -     TSH    2. Essential hypertension  Comments:  better controlled    3. Chronic anticoagulation  Comments:  no issues-    4. Paroxysmal atrial fibrillation  Comments:  tolerates well    5. Malignant neoplasm of lower-outer quadrant of left breast of female, estrogen receptor positive  Comments:  stable- good f/u, screeening.    6. Medicare annual wellness visit, subsequent  Comments:  [facility] great with increased phys activity, weight loss- encouraged same. Reminded Shringrix vacc and watch for Covid vacc recs in the fall.             Follow Up   Return in about 6 months (around 1/25/2024) for Recheck, Lab Today.  Patient was given instructions and  counseling regarding her condition or for health maintenance advice. Please see specific information pulled into the AVS if appropriate.

## 2023-07-26 VITALS
HEART RATE: 76 BPM | DIASTOLIC BLOOD PRESSURE: 78 MMHG | WEIGHT: 242 LBS | SYSTOLIC BLOOD PRESSURE: 132 MMHG | HEIGHT: 64 IN | BODY MASS INDEX: 41.32 KG/M2

## 2023-07-26 PROBLEM — C50.512 MALIGNANT NEOPLASM OF LOWER-OUTER QUADRANT OF LEFT BREAST OF FEMALE, ESTROGEN RECEPTOR POSITIVE: Status: RESOLVED | Noted: 2020-09-30 | Resolved: 2023-07-26

## 2023-07-26 PROBLEM — Z17.0 MALIGNANT NEOPLASM OF LOWER-OUTER QUADRANT OF LEFT BREAST OF FEMALE, ESTROGEN RECEPTOR POSITIVE: Status: RESOLVED | Noted: 2020-09-30 | Resolved: 2023-07-26

## 2023-07-26 PROBLEM — C50.919 MALIGNANT NEOPLASM OF BREAST: Status: RESOLVED | Noted: 2023-02-03 | Resolved: 2023-07-26

## 2023-07-26 LAB
ALBUMIN SERPL-MCNC: 4.7 G/DL (ref 3.5–5.2)
ALBUMIN/GLOB SERPL: 2.5 G/DL
ALP SERPL-CCNC: 143 U/L (ref 39–117)
ALT SERPL-CCNC: 23 U/L (ref 1–33)
AST SERPL-CCNC: 20 U/L (ref 1–32)
BILIRUB SERPL-MCNC: 0.4 MG/DL (ref 0–1.2)
BUN SERPL-MCNC: 7 MG/DL (ref 8–23)
BUN/CREAT SERPL: 7.4 (ref 7–25)
CALCIUM SERPL-MCNC: 9.6 MG/DL (ref 8.6–10.5)
CHLORIDE SERPL-SCNC: 102 MMOL/L (ref 98–107)
CHOLEST SERPL-MCNC: 139 MG/DL (ref 0–200)
CHOLEST/HDLC SERPL: 2.28 {RATIO}
CO2 SERPL-SCNC: 27.2 MMOL/L (ref 22–29)
CREAT SERPL-MCNC: 0.95 MG/DL (ref 0.57–1)
EGFRCR SERPLBLD CKD-EPI 2021: 62.6 ML/MIN/1.73
GLOBULIN SER CALC-MCNC: 1.9 GM/DL
GLUCOSE SERPL-MCNC: 99 MG/DL (ref 65–99)
HDLC SERPL-MCNC: 61 MG/DL (ref 40–60)
LDLC SERPL CALC-MCNC: 58 MG/DL (ref 0–100)
POTASSIUM SERPL-SCNC: 4.1 MMOL/L (ref 3.5–5.2)
PROT SERPL-MCNC: 6.6 G/DL (ref 6–8.5)
SODIUM SERPL-SCNC: 141 MMOL/L (ref 136–145)
TRIGL SERPL-MCNC: 111 MG/DL (ref 0–150)
TSH SERPL DL<=0.005 MIU/L-ACNC: 1.34 UIU/ML (ref 0.27–4.2)
VLDLC SERPL CALC-MCNC: 20 MG/DL (ref 5–40)

## 2023-08-01 ENCOUNTER — OFFICE VISIT (OUTPATIENT)
Dept: ONCOLOGY | Facility: HOSPITAL | Age: 73
End: 2023-08-01
Payer: MEDICARE

## 2023-08-01 VITALS
BODY MASS INDEX: 42.3 KG/M2 | RESPIRATION RATE: 18 BRPM | DIASTOLIC BLOOD PRESSURE: 64 MMHG | WEIGHT: 247.8 LBS | HEART RATE: 57 BPM | TEMPERATURE: 97.1 F | OXYGEN SATURATION: 98 % | SYSTOLIC BLOOD PRESSURE: 152 MMHG | HEIGHT: 64 IN

## 2023-08-01 DIAGNOSIS — Z17.0 MALIGNANT NEOPLASM OF LOWER-OUTER QUADRANT OF LEFT BREAST OF FEMALE, ESTROGEN RECEPTOR POSITIVE: Primary | ICD-10-CM

## 2023-08-01 DIAGNOSIS — C50.512 MALIGNANT NEOPLASM OF LOWER-OUTER QUADRANT OF LEFT BREAST OF FEMALE, ESTROGEN RECEPTOR POSITIVE: Primary | ICD-10-CM

## 2023-08-01 DIAGNOSIS — M19.91 PRIMARY OSTEOARTHRITIS, UNSPECIFIED SITE: ICD-10-CM

## 2023-08-01 PROCEDURE — G0463 HOSPITAL OUTPT CLINIC VISIT: HCPCS | Performed by: INTERNAL MEDICINE

## 2023-08-01 RX ORDER — EXEMESTANE 25 MG/1
25 TABLET ORAL DAILY
Qty: 90 TABLET | Refills: 1 | Status: SHIPPED | OUTPATIENT
Start: 2023-08-01

## 2023-08-01 NOTE — PROGRESS NOTES
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  ER/WY positive at 95%, HER-2 negative (0 by IHC), Ki-67 approximately 5%.  -Consult slide review at [facility] on 9/17/2020 showed essentially identical results.  -11/5/2020: Lumpectomy with sentinel lymph node biopsy on the left.  There is a 12 mm area of invasive mucinous carcinoma and a 6 mm area of low-grade DCIS.  Margins negative.  -Completed radiation therapy (Dr. Goldman)  -Ambry genetic testing done at [facility] was negative per patient report.  -Anastrozole started 12/3/2020 - stopped due to leg swelling  --switched to exemestane in August 2021      Malignant neoplasm of lower-outer quadrant of left breast of female, estrogen receptor positive (Resolved)   9/30/2020 Initial Diagnosis    Malignant neoplasm of lower-outer quadrant of left breast of female, estrogen receptor positive (CMS/HCC)         History of Present Illness  Patient comes in today for follow-up while in exemestane.  She says she is feeling much better because she is newly retired as of March.  She is able to get more exercise and is sleeping better.  She had a screening mammogram yesterday at [facility].    Review of Systems   All other systems reviewed and are negative.    Past Medical History:   Diagnosis Date    Acute bronchitis     Allergic     Anesthesia complication     PATIENT STAYS SLEEPY A LONGTIME AFTER SURGERY    Anxiety     Asthma     Atrial fibrillation      Borderline hyperglycemia     Breast cancer     Cat bite     Depression     Diarrhea     GERD (gastroesophageal reflux disease)     Headache     Hypertension     Hypocalcemia     Insomnia     Low back pain     Low back pain, episodic     Need for diphtheria-tetanus-pertussis (Tdap) vaccine     Neuromuscular disorder     Nosebleed     Obesity     Osteoarthritis, generalized     Panic attack     Pneumonia     x 3    Seasonal allergies     Sleep disturbances     Trochanteric bursitis     Vaginal candidiasis     Yeast infection      Past Surgical History:   Procedure Laterality Date    BREAST BIOPSY Left 2020    MALIGNANT    BREAST LUMPECTOMY WITH SENTINEL NODE BIOPSY Left 11/5/2020    Procedure: Left breast charley localized lumpectomy and left axillary sentinel lymph node biopsy;  Surgeon: Inés Jones MD;  Location: [facility] MAIN OR;  Service: General;  Laterality: Left;    CERVICAL CONIZATION, LEEP      CHOLECYSTECTOMY      COLONOSCOPY  approx 2011    normal per patient-repeat 10 years    COLONOSCOPY N/A 6/17/2022    Procedure: COLONOSCOPY INTO CECUM AND T.I. WITH COLD SNARE AND COLD BIOPSY POLYPECTOMIES AND 2CC TATTOO PLACEMENT;  Surgeon: Marlene Amin MD;  Location: [facility] ENDOSCOPY;  Service: Gastroenterology;  Laterality: N/A;  PRE- POSITIVE COLOGUARD  POST- HEMORRHOIDS, DIVERTICULOSIS, POLYPS, TRANSVERSE MULTILOBE FLAT POLYP NOT REMOVED- TATTOO PLACED    DILATION AND CURETTAGE, DIAGNOSTIC / THERAPEUTIC      TONSILLECTOMY      TUBAL ABDOMINAL LIGATION       Social History     Socioeconomic History    Marital status:    Tobacco Use    Smoking status: Never    Smokeless tobacco: Never   Vaping Use    Vaping Use: Never used   Substance and Sexual Activity    Alcohol use: No    Drug use: No    Sexual activity: Defer     Family History   Problem Relation Age of Onset    Pancreatic cancer Paternal Aunt     Cervical cancer Mother 41    Cancer Mother         Cervical Cancer    Melanoma Sister 60    Thyroid  "cancer Sister 60    Breast cancer Niece 38        TRIPLE -    Malig Hyperthermia Neg Hx        Objective   Physical Exam  General: Alert, cooperative, no acute distress  Eyes: Anicteric sclera, PERRLA  Respiratory: normal respiratory effort  Cardiovascular: no lower extremity edema  Skin: Normal tone, no rash, no lesions  Psychiatric: Appropriate affect, intact judgment  Neurologic: No focal sensory or motor deficits, normal cognition   Musculoskeletal: Normal muscle strength and tone  Extremities: No clubbing, cyanosis, or deformities    Vitals:    08/01/23 1306   BP: 152/64   Pulse: 57   Resp: 18   Temp: 97.1 °F (36.2 °C)   SpO2: 98%   Weight: 112 kg (247 lb 12.8 oz)   Height: 162.6 cm (64.02\")   PainSc: 0-No pain     ECOG score: 0         PHQ-9 Total Score:         Result Review :   The following data was reviewed by: Ludy Cartwright MD PhD on 08/01/2023:  Lab Results   Component Value Date    HGB 13.1 09/13/2022    HCT 41.1 09/13/2022    MCV 88.0 09/13/2022     09/13/2022    WBC 7.71 09/13/2022    NEUTROABS 5.85 09/13/2022    LYMPHSABS 1.16 09/13/2022    MONOSABS 0.46 09/13/2022    EOSABS 0.17 09/13/2022    BASOSABS 0.05 09/13/2022     Lab Results   Component Value Date    GLUCOSE 99 07/25/2023    BUN 7 (L) 07/25/2023    CREATININE 0.95 07/25/2023     07/25/2023    K 4.1 07/25/2023     07/25/2023    CO2 27.2 07/25/2023    CALCIUM 9.6 07/25/2023    PROTEINTOT 7.6 09/13/2022    ALBUMIN 4.7 07/25/2023    BILITOT 0.4 07/25/2023    ALKPHOS 143 (H) 07/25/2023    AST 20 07/25/2023    ALT 23 07/25/2023     No results found for: IRON, LABIRON, TRANSFERRIN, TIBC  No results found for: LDH, FERRITIN, UYATRHMF48, FOLATE  No results found for: PSA, CEA, AFP, ,        Assessment and Plan    Diagnoses and all orders for this visit:    1. Malignant neoplasm of lower-outer quadrant of left breast of female, estrogen receptor positive (Primary)  -     exemestane (AROMASIN) 25 MG tablet; Take 1 " tablet by mouth Daily.  Dispense: 90 tablet; Refill: 1      1. Malignant neoplasm of female breast  Mucinous breast cancer subtype. Patient is currently tolerating exemestane well and will continue this medication as planned.  Screening mammogram was normal.  The patient will follow-up in 6 months.      2. Osteopenia  She completed a DEXA scan on 01/24/2023 that noted osteopenia. She was advised to take vitamin D and calcium supplements, and voiced willingness to comply.            Patient was given instructions and counseling regarding her condition or for health maintenance advice. Please see specific information pulled into the AVS if appropriate.     Ludy Cartwright MD PhD    9/13/2023

## 2023-08-02 RX ORDER — GABAPENTIN 100 MG/1
300 CAPSULE ORAL NIGHTLY
Qty: 90 CAPSULE | Refills: 1 | Status: SHIPPED | OUTPATIENT
Start: 2023-08-02

## 2023-08-28 RX ORDER — ATORVASTATIN CALCIUM 20 MG/1
20 TABLET, FILM COATED ORAL DAILY
Qty: 90 TABLET | Refills: 1 | Status: SHIPPED | OUTPATIENT
Start: 2023-08-28

## 2023-10-05 NOTE — H&P
Name: Helena Romero ADMIT: 10/26/2020   : 1950  PCP: Ally Lagos MD    MRN: [id number] LOS: 0 days   AGE/SEX: 70 y.o. female  ROOM: Room/bed info not found       Chief complaint left breast carcinoma    Present Illness or Internal History:  Patient is a 70 y.o. female presents with left breast carcinoma.     Past Surgical History:  Past Surgical History:   Procedure Laterality Date   • BREAST BIOPSY Left 2020    MALIGNANT   • CERVICAL CONIZATION, LEEP     • CHOLECYSTECTOMY     • COLONOSCOPY  approx     normal per patient-repeat 10 years   • DILATION AND CURETTAGE, DIAGNOSTIC / THERAPEUTIC     • TONSILLECTOMY     • TUBAL ABDOMINAL LIGATION         Past Medical History:  Past Medical History:   Diagnosis Date   • Acute bronchitis    • Allergic    • Anxiety    • Asthma    • Borderline hyperglycemia    • Breast cancer (CMS/HCC)    • Cat bite    • Depression    • Diarrhea    • GERD (gastroesophageal reflux disease)    • Hypertension    • Hypocalcemia    • Insomnia    • Low back pain    • Low back pain, episodic    • Need for diphtheria-tetanus-pertussis (Tdap) vaccine    • Neuromuscular disorder (CMS/HCC)    • Obesity    • Osteoarthritis, generalized    • Panic attack    • Pneumonia     x 3   • Seasonal allergies    • Sleep disturbances    • Trochanteric bursitis    • Vaginal candidiasis    • Yeast infection        Home Medications:  (Not in a hospital admission)      Allergies:  Lisinopril and Codeine    Family History:  Family History   Problem Relation Age of Onset   • Pancreatic cancer Paternal Aunt    • Cervical cancer Mother 41   • Melanoma Sister 60   • Thyroid cancer Sister 60   • Breast cancer Niece 38        TRIPLE -       Social History:  Social History     Tobacco Use   • Smoking status: Never Smoker   • Smokeless tobacco: Never Used   Substance Use Topics   • Alcohol use: No     Frequency: Never   • Drug use: No        Objective     Physical Exam:    No exam performed today,    Vital  Signs  Temp:  [97.4 °F (36.3 °C)] 97.4 °F (36.3 °C)  Heart Rate:  [70] 70  Resp:  [18] 18  BP: (152)/(83) 152/83    Anticipated Surgical Procedure:  Ultrasound/ mammographic guided OTILIA placement    The risks, benefits and alternatives of this procedure have been discussed with the patient or responsible party: Yes        Thierno Hassan Jr., MD  10/26/20  10:34 EDT                                           show

## 2023-12-13 RX ORDER — OMEPRAZOLE 20 MG/1
20 CAPSULE, DELAYED RELEASE ORAL DAILY
Qty: 90 CAPSULE | Refills: 1 | Status: SHIPPED | OUTPATIENT
Start: 2023-12-13

## 2024-01-02 DIAGNOSIS — M19.91 PRIMARY OSTEOARTHRITIS, UNSPECIFIED SITE: ICD-10-CM

## 2024-01-02 RX ORDER — GABAPENTIN 100 MG/1
300 CAPSULE ORAL NIGHTLY
Qty: 90 CAPSULE | Refills: 1 | Status: SHIPPED | OUTPATIENT
Start: 2024-01-02

## 2024-01-29 ENCOUNTER — OFFICE VISIT (OUTPATIENT)
Dept: INTERNAL MEDICINE | Facility: CLINIC | Age: 74
End: 2024-01-29
Payer: MEDICARE

## 2024-01-29 VITALS
BODY MASS INDEX: 41.32 KG/M2 | DIASTOLIC BLOOD PRESSURE: 88 MMHG | HEART RATE: 74 BPM | SYSTOLIC BLOOD PRESSURE: 132 MMHG | HEIGHT: 64 IN | WEIGHT: 242 LBS

## 2024-01-29 DIAGNOSIS — G89.29 CHRONIC RIGHT-SIDED LOW BACK PAIN WITHOUT SCIATICA: ICD-10-CM

## 2024-01-29 DIAGNOSIS — I48.0 PAROXYSMAL ATRIAL FIBRILLATION: Chronic | ICD-10-CM

## 2024-01-29 DIAGNOSIS — Z79.899 HIGH RISK MEDICATION USE: ICD-10-CM

## 2024-01-29 DIAGNOSIS — M54.50 CHRONIC RIGHT-SIDED LOW BACK PAIN WITHOUT SCIATICA: ICD-10-CM

## 2024-01-29 DIAGNOSIS — I10 ESSENTIAL HYPERTENSION: Primary | Chronic | ICD-10-CM

## 2024-01-29 PROCEDURE — 3075F SYST BP GE 130 - 139MM HG: CPT | Performed by: INTERNAL MEDICINE

## 2024-01-29 PROCEDURE — 99214 OFFICE O/P EST MOD 30 MIN: CPT | Performed by: INTERNAL MEDICINE

## 2024-01-29 PROCEDURE — 3079F DIAST BP 80-89 MM HG: CPT | Performed by: INTERNAL MEDICINE

## 2024-01-29 RX ORDER — HYDROCODONE BITARTRATE AND ACETAMINOPHEN 5; 325 MG/1; MG/1
1 TABLET ORAL EVERY 6 HOURS PRN
Qty: 30 TABLET | Refills: 0 | Status: SHIPPED | OUTPATIENT
Start: 2024-01-29

## 2024-01-29 NOTE — PROGRESS NOTES
"Chief Complaint  Hypertension and Anxiety    Subjective        Helena Romero presents to [facility]  History of Present Illness has been seeing Dr. Theo Meza about nasal issues, doing much better. He recently tx her for sinus infection- since then she has had cough at night that is makes her have to use the inhaler.   Taking care of her sister whose   tragically and she since broke her knee. Not sure the buspirone is helping as much anymore- feels tremulous inside (similar to how she left when first started)  She stopped the HCTZ- wasn't swelling, maybe made her dizzy sometimes.   Has some low back pain that she relates to the Aromasin. She would like a pain pill to take as needed at night only. Her mobility is starting to be affected by it- trouble getting out of recliner, etc.       Objective   Vital Signs:  /88   Pulse 74   Ht 162.6 cm (64.02\")   Wt 110 kg (242 lb)   BMI 41.51 kg/m²   Estimated body mass index is 41.51 kg/m² as calculated from the following:    Height as of this encounter: 162.6 cm (64.02\").    Weight as of this encounter: 110 kg (242 lb).               Physical Exam  Constitutional:       Appearance: Normal appearance.   Cardiovascular:      Rate and Rhythm: Normal rate and regular rhythm.   Pulmonary:      Effort: Pulmonary effort is normal.   Musculoskeletal:      Right lower leg: No edema.      Left lower leg: No edema.        Result Review :                     Assessment and Plan     Diagnoses and all orders for this visit:    1. Essential hypertension (Primary)  Comments:  controlled, could try the HCTZ every other day if BP doesn't tolerate daily- as needed for swelling only.  Orders:  -     Comprehensive Metabolic Panel; Future  -     Lipid Panel With / Chol / HDL Ratio; Future    2. Paroxysmal atrial fibrillation  Comments:  stable- rate controlled, anticoagulated.  Orders:  -     TSH; Future    3. High risk medication " use  Comments:  UdS at f/u  Orders:  -     Compliance Drug Analysis, Ur - Urine, Clean Catch; Future    4. Chronic right-sided low back pain without sciatica  Comments:  stressed need to treat problme- ie PT, exercise can use hydrocodne 1/2 tablet prn- watch for refills, use.  Orders:  -     HYDROcodone-acetaminophen (NORCO) 5-325 MG per tablet; Take 1 tablet by mouth Every 6 (Six) Hours As Needed for Moderate Pain.  Dispense: 30 tablet; Refill: 0    Other orders  -     SCANNED - MAMMO             Follow Up     No follow-ups on file.  Patient was given instructions and counseling regarding her condition or for health maintenance advice. Please see specific information pulled into the AVS if appropriate.

## 2024-02-01 ENCOUNTER — OFFICE VISIT (OUTPATIENT)
Dept: ONCOLOGY | Facility: HOSPITAL | Age: 74
End: 2024-02-01
Payer: MEDICARE

## 2024-02-01 VITALS
WEIGHT: 244.27 LBS | TEMPERATURE: 97.7 F | BODY MASS INDEX: 41.7 KG/M2 | HEIGHT: 64 IN | HEART RATE: 58 BPM | DIASTOLIC BLOOD PRESSURE: 58 MMHG | OXYGEN SATURATION: 100 % | RESPIRATION RATE: 18 BRPM | SYSTOLIC BLOOD PRESSURE: 133 MMHG

## 2024-02-01 DIAGNOSIS — Z17.0 MALIGNANT NEOPLASM OF LOWER-OUTER QUADRANT OF LEFT BREAST OF FEMALE, ESTROGEN RECEPTOR POSITIVE: ICD-10-CM

## 2024-02-01 DIAGNOSIS — C50.512 MALIGNANT NEOPLASM OF LOWER-OUTER QUADRANT OF LEFT BREAST OF FEMALE, ESTROGEN RECEPTOR POSITIVE: ICD-10-CM

## 2024-02-01 DIAGNOSIS — Z12.31 ENCOUNTER FOR SCREENING MAMMOGRAM FOR MALIGNANT NEOPLASM OF BREAST: Primary | ICD-10-CM

## 2024-02-01 PROCEDURE — G0463 HOSPITAL OUTPT CLINIC VISIT: HCPCS | Performed by: NURSE PRACTITIONER

## 2024-02-01 RX ORDER — EXEMESTANE 25 MG/1
25 TABLET ORAL DAILY
Qty: 90 TABLET | Refills: 1 | Status: SHIPPED | OUTPATIENT
Start: 2024-02-01

## 2024-02-01 NOTE — PROGRESS NOTES
Chief Complaint/Reason for Referral:  Malignant neoplasm of lower-outer quadrant of left breast o    Ally Lagos MD Ellis, Julie T, MD      Subjective    History of Present Illness    Ms. 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 She saw Dr. Cartwright previously and has since left the practice. Completed lumpectomy and radaition. Biopsy is ER / WA positive. Initially started Anastrozole and then transitioned to Exemestane. Reports she is tolerating better than the Anastrozole. Still has some leg swelling. She reports her PCP put on the HCTZ for the leg swelling. She is due for mammogram on 8/1/2024. Dexa scan is up to date. Does not take Calcium / Vitamin due to interaction with some of her meds she reports. She reports she eats high calcium diet. Feeling well overall.       Cancer Staging   No matching staging information was found for the patient.       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  ER/WA positive at 95%, HER-2 negative (0 by IHC), Ki-67 approximately 5%.  -Consult slide review at [facility] on 9/17/2020 showed essentially identical results.  -11/5/2020: Lumpectomy with sentinel lymph node biopsy on the left.  There is a 12 mm area of invasive mucinous carcinoma and a 6 mm area of low-grade DCIS.  Margins negative.  -Completed radiation therapy (Dr. Goldman)  -[facility] genetic testing done at [facility] was negative per patient report.  -Anastrozole started 12/3/2020 - stopped due to leg swelling  --switched to exemestane in August 2021      Malignant neoplasm of lower-outer quadrant of left breast of female, estrogen receptor  positive (Resolved)   9/30/2020 Initial Diagnosis    Malignant neoplasm of lower-outer quadrant of left breast of female, estrogen receptor positive (CMS/HCC)         Review of Systems   Constitutional:  Positive for fatigue.   All other systems reviewed and are negative.      Current Outpatient Medications on File Prior to Visit   Medication Sig Dispense Refill    Acetaminophen (TYLENOL 8 HOUR ARTHRITIS PAIN PO) Take 650 mg by mouth Daily As Needed.      albuterol sulfate  (90 Base) MCG/ACT inhaler Inhale 2 puffs Every 4 (Four) Hours As Needed for Wheezing. 18 g 1    atorvastatin (LIPITOR) 20 MG tablet TAKE 1 TABLET BY MOUTH DAILY 90 tablet 1    busPIRone (BUSPAR) 15 MG tablet TAKE 1 TABLET BY MOUTH THREE TIMES DAILY 270 tablet 1    flecainide (TAMBOCOR) 50 MG tablet Take 1 tablet by mouth 2 (Two) Times a Day.      gabapentin (NEURONTIN) 100 MG capsule TAKE 3 CAPSULES BY MOUTH EVERY NIGHT 90 capsule 1    hydroCHLOROthiazide (HYDRODIURIL) 12.5 MG tablet Take 1 tablet by mouth Daily. 90 tablet 1    HYDROcodone-acetaminophen (NORCO) 5-325 MG per tablet Take 1 tablet by mouth Every 6 (Six) Hours As Needed for Moderate Pain. 30 tablet 0    metoprolol succinate XL (TOPROL-XL) 50 MG 24 hr tablet Take 1 tablet by mouth 2 (Two) Times a Day.      mupirocin (BACTROBAN) 2 % ointment APPLY INSIDE NOSTRILS AS DIRECTED TWICE DAILY      olmesartan (BENICAR) 40 MG tablet TAKE 1 TABLET BY MOUTH DAILY 90 tablet 1    omeprazole (priLOSEC) 20 MG capsule TAKE 1 CAPSULE BY MOUTH DAILY 90 capsule 1    polyethylene glycol (MiraLax) 17 GM/SCOOP powder Take 17 g by mouth Daily. Take cap of powder with 8oz water daily surrounding surgery and while on narcotic 119 g 0    rivaroxaban (XARELTO) 20 MG tablet Take 1 tablet by mouth Daily.      RSVPreF3 Vac Recomb Adjuvanted (Arexvy) 120 MCG/0.5ML reconstituted suspension injection Inject  into the appropriate muscle as directed by prescriber. 1 each 0    traZODone (DESYREL) 50 MG tablet  TAKE 1 TO 2 TABLETS BY MOUTH EVERY NIGHT AT BEDTIME AS NEEDED 60 tablet 3    [DISCONTINUED] exemestane (AROMASIN) 25 MG tablet Take 1 tablet by mouth Daily. 90 tablet 1     No current facility-administered medications on file prior to visit.       Allergies   Allergen Reactions    Codeine Nausea Only    Lisinopril Hives    Azithromycin Unknown - Low Severity     Past Medical History:   Diagnosis Date    Acute bronchitis     Allergic     Anesthesia complication     PATIENT STAYS SLEEPY A LONGTIME AFTER SURGERY    Anxiety     Asthma     Atrial fibrillation     Borderline hyperglycemia     Breast cancer     Cat bite     Depression     Diarrhea     Encounter for monitoring flecainide therapy 10/21/2022    GERD (gastroesophageal reflux disease)     Headache     Hypertension     Hypocalcemia     Insomnia     Low back pain     Low back pain, episodic     Need for diphtheria-tetanus-pertussis (Tdap) vaccine     Neuromuscular disorder     Nosebleed     Obesity     Osteoarthritis, generalized     Panic attack     Pneumonia     x 3    Seasonal allergies     Sleep disturbances     Trochanteric bursitis     Vaginal candidiasis     Yeast infection      Past Surgical History:   Procedure Laterality Date    BREAST BIOPSY Left 2020    MALIGNANT    BREAST LUMPECTOMY WITH SENTINEL NODE BIOPSY Left 11/5/2020    Procedure: Left breast charley localized lumpectomy and left axillary sentinel lymph node biopsy;  Surgeon: Inés Jones MD;  Location: [facility] MAIN OR;  Service: General;  Laterality: Left;    CERVICAL CONIZATION, LEEP      CHOLECYSTECTOMY      COLONOSCOPY  approx 2011    normal per patient-repeat 10 years    COLONOSCOPY N/A 6/17/2022    Procedure: COLONOSCOPY INTO CECUM AND T.I. WITH COLD SNARE AND COLD BIOPSY POLYPECTOMIES AND 2CC TATTOO PLACEMENT;  Surgeon: Marlene Amin MD;  Location: [facility] ENDOSCOPY;  Service: Gastroenterology;  Laterality: N/A;  PRE- POSITIVE COLOGUARD  POST- HEMORRHOIDS, DIVERTICULOSIS, POLYPS,  TRANSVERSE MULTILOBE FLAT POLYP NOT REMOVED- TATTOO PLACED    DILATION AND CURETTAGE, DIAGNOSTIC / THERAPEUTIC      TONSILLECTOMY      TUBAL ABDOMINAL LIGATION       Social History     Socioeconomic History    Marital status:    Tobacco Use    Smoking status: Never    Smokeless tobacco: Never   Vaping Use    Vaping Use: Never used   Substance and Sexual Activity    Alcohol use: No    Drug use: No    Sexual activity: Defer     Family History   Problem Relation Age of Onset    Pancreatic cancer Paternal Aunt     Cervical cancer Mother 41    Cancer Mother         Cervical Cancer    Melanoma Sister 60    Thyroid cancer Sister 60    Breast cancer Niece 38        TRIPLE -    Malig Hyperthermia Neg Hx      Immunization History   Administered Date(s) Administered    Arexvy (RSV, Adults 60+ yrs) 01/29/2024    COVID-19 (MODERNA) 1st,2nd,3rd Dose Monovalent 01/15/2021, 02/12/2021, 08/28/2021    COVID-19 (MODERNA) BIVALENT 12+YRS 11/14/2022    Covid-19 (Pfizer) Gray Cap Monovalent 05/24/2022    FLUAD TRI 65YR+ 10/01/2019    Fluad Quad 65+ 10/01/2019    Fluzone High Dose =>65 Years ([employer] ONLY) 01/01/2016, 11/02/2017, 11/01/2018    Fluzone High-Dose 65+yrs 11/01/2021, 11/14/2022    Pneumococcal Conjugate 13-Valent (PCV13) 11/02/2017    Pneumococcal Polysaccharide (PPSV23) 01/06/2015    Tdap 06/26/2017       Tobacco Use: Low Risk  (2/1/2024)    Patient History     Smoking Tobacco Use: Never     Smokeless Tobacco Use: Never     Passive Exposure: Not on file       Objective     Physical Exam  Vitals and nursing note reviewed.   Constitutional:       Appearance: Normal appearance. She is obese.   HENT:      Head: Normocephalic.      Nose: Nose normal.      Mouth/Throat:      Mouth: Mucous membranes are moist.   Cardiovascular:      Rate and Rhythm: Normal rate and regular rhythm.      Pulses: Normal pulses.      Heart sounds: Normal heart sounds. No murmur heard.  Pulmonary:      Effort: Pulmonary effort is normal. No  "respiratory distress.      Breath sounds: Normal breath sounds.   Abdominal:      General: Bowel sounds are normal.      Palpations: Abdomen is soft.   Musculoskeletal:         General: Normal range of motion.      Cervical back: Normal range of motion and neck supple.   Skin:     General: Skin is warm and dry.      Capillary Refill: Capillary refill takes less than 2 seconds.   Neurological:      General: No focal deficit present.      Mental Status: She is oriented to person, place, and time.   Psychiatric:         Mood and Affect: Mood normal.         Behavior: Behavior normal.         Thought Content: Thought content normal.         Judgment: Judgment normal.         Vitals:    02/01/24 1302   BP: 133/58   Pulse: 58   Resp: 18   Temp: 97.7 °F (36.5 °C)   TempSrc: Temporal   SpO2: 100%   Weight: 111 kg (244 lb 4.3 oz)   Height: 162.6 cm (64.02\")   PainSc: 0-No pain       Wt Readings from Last 3 Encounters:   02/01/24 111 kg (244 lb 4.3 oz)   01/29/24 110 kg (242 lb)   08/01/23 112 kg (247 lb 12.8 oz)          ECOG score: 0         ECOG: (0) Fully Active - Able to Carry On All Pre-disease Performance Without Restriction  Fall Risk Assessment was completed, and patient is at low risk for falls.  PHQ-9 Total Score:         The patient is  experiencing fatigue. Fatigue score: 2    PT/OT Functional Screening: PT fx screen : No needs identified  Speech Functional Screening: Speech fx screen : No needs identified  Rehab to be ordered: Rehab to be ordered : No needs identified        Result Review :  The following data was reviewed by: JOHN Hooks on 02/01/2024:  Lab Results   Component Value Date    HGB 13.1 09/13/2022    HCT 41.1 09/13/2022    MCV 88.0 09/13/2022     09/13/2022    WBC 7.71 09/13/2022    NEUTROABS 5.85 09/13/2022    LYMPHSABS 1.16 09/13/2022    MONOSABS 0.46 09/13/2022    EOSABS 0.17 09/13/2022    BASOSABS 0.05 09/13/2022     Lab Results   Component Value Date    GLUCOSE 99 " "07/25/2023    BUN 7 (L) 07/25/2023    CREATININE 0.95 07/25/2023     07/25/2023    K 4.1 07/25/2023     07/25/2023    CO2 27.2 07/25/2023    CALCIUM 9.6 07/25/2023    PROTEINTOT 7.6 09/13/2022    ALBUMIN 4.7 07/25/2023    BILITOT 0.4 07/25/2023    ALKPHOS 143 (H) 07/25/2023    AST 20 07/25/2023    ALT 23 07/25/2023        No results found for: \"IRON\", \"LABIRON\", \"TRANSFERRIN\", \"TIBC\"  No results found for: \"LDH\", \"FERRITIN\", \"PDYOQNAG71\", \"FOLATE\"  No results found for: \"PSA\", \"CEA\", \"AFP\", \"\", \"\"    No Images in the past 120 days found..         Assessment and Plan:  Diagnoses and all orders for this visit:    1. Encounter for screening mammogram for malignant neoplasm of breast (Primary)  -     Mammo Screening Digital Tomosynthesis Bilateral With CAD; Future    2. Malignant neoplasm of lower-outer quadrant of left breast of female, estrogen receptor positive  -     Mammo Screening Digital Tomosynthesis Bilateral With CAD; Future  -     exemestane (AROMASIN) 25 MG tablet; Take 1 tablet by mouth Daily.  Dispense: 90 tablet; Refill: 1    Invasive mucinous hormone receptor +, Her 2 neg left sided breast cancer diagnosed in 9/4/2020. Completed lumpectomy and radiation. Taking Exemestane as prescribed daily. Mammogram is due on 8/1/2024 and has been ordered. Last mammogram on 7/31/2023 normal. Dexa scan not due until 1/2025. Last dexa scan with osteopenia. We discussed breast cancer index testing near the end of 5 years.     She does self breast exams. Call with any lumps or masses if palpated. Exemestane refilled today.     I spent 20 minutes caring for Helena on this date of service. This time includes time spent by me in the following activities:preparing for the visit, reviewing tests, obtaining and/or reviewing a separately obtained history, performing a medically appropriate examination and/or evaluation , counseling and educating the patient/family/caregiver, ordering medications, tests, or " procedures, referring and communicating with other health care professionals , documenting information in the medical record, independently interpreting results and communicating that information with the patient/family/caregiver, and care coordination    Patient Follow Up: 6 months after next mammogram is completed.   Patient was given instructions and counseling regarding her condition or for health maintenance advice. Please see specific information pulled into the AVS if appropriate.     Francheska Miller, APRN    2/1/2024    .tob

## 2024-03-07 RX ORDER — ATORVASTATIN CALCIUM 20 MG/1
20 TABLET, FILM COATED ORAL DAILY
Qty: 90 TABLET | Refills: 1 | Status: SHIPPED | OUTPATIENT
Start: 2024-03-07

## 2024-04-04 DIAGNOSIS — M19.91 PRIMARY OSTEOARTHRITIS, UNSPECIFIED SITE: ICD-10-CM

## 2024-04-05 RX ORDER — GABAPENTIN 100 MG/1
300 CAPSULE ORAL NIGHTLY
Qty: 90 CAPSULE | Refills: 1 | Status: SHIPPED | OUTPATIENT
Start: 2024-04-05

## 2024-04-10 RX ORDER — OLMESARTAN MEDOXOMIL 40 MG/1
40 TABLET ORAL DAILY
Qty: 90 TABLET | Refills: 1 | Status: SHIPPED | OUTPATIENT
Start: 2024-04-10

## 2024-04-23 RX ORDER — BUSPIRONE HYDROCHLORIDE 15 MG/1
15 TABLET ORAL 3 TIMES DAILY
Qty: 270 TABLET | Refills: 1 | Status: SHIPPED | OUTPATIENT
Start: 2024-04-23

## 2024-06-05 RX ORDER — OMEPRAZOLE 20 MG/1
20 CAPSULE, DELAYED RELEASE ORAL DAILY
Qty: 90 CAPSULE | Refills: 1 | Status: SHIPPED | OUTPATIENT
Start: 2024-06-05

## 2024-06-14 DIAGNOSIS — M19.91 PRIMARY OSTEOARTHRITIS, UNSPECIFIED SITE: ICD-10-CM

## 2024-06-14 RX ORDER — GABAPENTIN 100 MG/1
300 CAPSULE ORAL NIGHTLY
Qty: 90 CAPSULE | Refills: 0 | Status: SHIPPED | OUTPATIENT
Start: 2024-06-14

## 2024-07-09 ENCOUNTER — OFFICE VISIT (OUTPATIENT)
Dept: INTERNAL MEDICINE | Facility: CLINIC | Age: 74
End: 2024-07-09
Payer: MEDICARE

## 2024-07-09 VITALS
TEMPERATURE: 97.5 F | WEIGHT: 236.2 LBS | SYSTOLIC BLOOD PRESSURE: 130 MMHG | HEART RATE: 82 BPM | BODY MASS INDEX: 40.33 KG/M2 | HEIGHT: 64 IN | DIASTOLIC BLOOD PRESSURE: 84 MMHG

## 2024-07-09 DIAGNOSIS — I10 ESSENTIAL HYPERTENSION: Chronic | ICD-10-CM

## 2024-07-09 DIAGNOSIS — E78.49 OTHER HYPERLIPIDEMIA: Chronic | ICD-10-CM

## 2024-07-09 DIAGNOSIS — J45.40 MODERATE PERSISTENT ASTHMA WITHOUT COMPLICATION: Primary | Chronic | ICD-10-CM

## 2024-07-09 PROCEDURE — 1126F AMNT PAIN NOTED NONE PRSNT: CPT | Performed by: INTERNAL MEDICINE

## 2024-07-09 PROCEDURE — 1159F MED LIST DOCD IN RCRD: CPT | Performed by: INTERNAL MEDICINE

## 2024-07-09 PROCEDURE — G2211 COMPLEX E/M VISIT ADD ON: HCPCS | Performed by: INTERNAL MEDICINE

## 2024-07-09 PROCEDURE — 3079F DIAST BP 80-89 MM HG: CPT | Performed by: INTERNAL MEDICINE

## 2024-07-09 PROCEDURE — 3075F SYST BP GE 130 - 139MM HG: CPT | Performed by: INTERNAL MEDICINE

## 2024-07-09 PROCEDURE — 1160F RVW MEDS BY RX/DR IN RCRD: CPT | Performed by: INTERNAL MEDICINE

## 2024-07-09 PROCEDURE — 99214 OFFICE O/P EST MOD 30 MIN: CPT | Performed by: INTERNAL MEDICINE

## 2024-07-09 RX ORDER — FLUTICASONE PROPIONATE AND SALMETEROL XINAFOATE 115; 21 UG/1; UG/1
2 AEROSOL, METERED RESPIRATORY (INHALATION)
Qty: 12 G | Refills: 11 | Status: SHIPPED | OUTPATIENT
Start: 2024-07-09

## 2024-07-09 NOTE — PROGRESS NOTES
"Chief Complaint  Hypertension    Subjective        Helena Romero presents to [facility]  History of Present Illness  Monitors BP  - goes up when she has one of her coughing fits. That happens often- has sinusitis recently but stays congested.  Needing rescue inhaler more often- at night before she goes to bed. Mucinex not much help.   She feels like chest is tight- uses albuterol MN  Feels the trazodone and gabapentin are very helpful.     Objective   Vital Signs:  /84   Pulse 82   Temp 97.5 °F (36.4 °C)   Ht 162.6 cm (64\")   Wt 107 kg (236 lb 3.2 oz)   BMI 40.54 kg/m²   Estimated body mass index is 40.54 kg/m² as calculated from the following:    Height as of this encounter: 162.6 cm (64\").    Weight as of this encounter: 107 kg (236 lb 3.2 oz).               Physical Exam  Constitutional:       Appearance: Normal appearance.   Cardiovascular:      Rate and Rhythm: Normal rate and regular rhythm.   Pulmonary:      Effort: Pulmonary effort is normal.      Comments: Some congestion       Result Review :                     Assessment and Plan     Diagnoses and all orders for this visit:    1. Moderate persistent asthma without complication (Primary)  Comments:  will try getting her on  Advair - watch for allergens-    2. Essential hypertension  Comments:  Controlled, no change.    3. Other hyperlipidemia  Comments:  check labs today    Other orders  -     fluticasone-salmeterol (Advair HFA) 115-21 MCG/ACT inhaler; Inhale 2 puffs 2 (Two) Times a Day.  Dispense: 12 g; Refill: 11             Follow Up     Return in about 3 months (around 10/9/2024) for Lab Today, Medicare Wellness.  Patient was given instructions and counseling regarding her condition or for health maintenance advice. Please see specific information pulled into the AVS if appropriate.         "

## 2024-07-29 DIAGNOSIS — Z17.0 MALIGNANT NEOPLASM OF LOWER-OUTER QUADRANT OF LEFT BREAST OF FEMALE, ESTROGEN RECEPTOR POSITIVE: ICD-10-CM

## 2024-07-29 DIAGNOSIS — C50.512 MALIGNANT NEOPLASM OF LOWER-OUTER QUADRANT OF LEFT BREAST OF FEMALE, ESTROGEN RECEPTOR POSITIVE: ICD-10-CM

## 2024-07-30 RX ORDER — EXEMESTANE 25 MG/1
25 TABLET ORAL DAILY
Qty: 90 TABLET | Refills: 1 | Status: SHIPPED | OUTPATIENT
Start: 2024-07-30

## 2024-07-31 DIAGNOSIS — M19.91 PRIMARY OSTEOARTHRITIS, UNSPECIFIED SITE: ICD-10-CM

## 2024-07-31 RX ORDER — GABAPENTIN 100 MG/1
300 CAPSULE ORAL NIGHTLY
Qty: 90 CAPSULE | Refills: 3 | Status: SHIPPED | OUTPATIENT
Start: 2024-07-31

## 2024-08-15 ENCOUNTER — PATIENT MESSAGE (OUTPATIENT)
Dept: ONCOLOGY | Facility: HOSPITAL | Age: 74
End: 2024-08-15
Payer: MEDICARE

## 2024-08-20 RX ORDER — TRAZODONE HYDROCHLORIDE 50 MG/1
TABLET ORAL
Qty: 60 TABLET | Refills: 3 | Status: SHIPPED | OUTPATIENT
Start: 2024-08-20

## 2024-09-03 RX ORDER — ATORVASTATIN CALCIUM 20 MG/1
20 TABLET, FILM COATED ORAL DAILY
Qty: 90 TABLET | Refills: 1 | Status: SHIPPED | OUTPATIENT
Start: 2024-09-03

## 2024-10-02 RX ORDER — BUSPIRONE HYDROCHLORIDE 15 MG/1
15 TABLET ORAL 3 TIMES DAILY
Qty: 270 TABLET | Refills: 1 | Status: SHIPPED | OUTPATIENT
Start: 2024-10-02

## 2024-10-09 ENCOUNTER — OFFICE VISIT (OUTPATIENT)
Dept: INTERNAL MEDICINE | Facility: CLINIC | Age: 74
End: 2024-10-09
Payer: MEDICARE

## 2024-10-09 VITALS
BODY MASS INDEX: 40.97 KG/M2 | HEART RATE: 74 BPM | SYSTOLIC BLOOD PRESSURE: 120 MMHG | WEIGHT: 240 LBS | DIASTOLIC BLOOD PRESSURE: 78 MMHG | HEIGHT: 64 IN

## 2024-10-09 DIAGNOSIS — E66.01 CLASS 3 SEVERE OBESITY DUE TO EXCESS CALORIES WITH SERIOUS COMORBIDITY AND BODY MASS INDEX (BMI) OF 40.0 TO 44.9 IN ADULT: Chronic | ICD-10-CM

## 2024-10-09 DIAGNOSIS — E66.813 CLASS 3 SEVERE OBESITY DUE TO EXCESS CALORIES WITH SERIOUS COMORBIDITY AND BODY MASS INDEX (BMI) OF 40.0 TO 44.9 IN ADULT: Chronic | ICD-10-CM

## 2024-10-09 DIAGNOSIS — Z00.00 MEDICARE ANNUAL WELLNESS VISIT, SUBSEQUENT: Primary | ICD-10-CM

## 2024-10-09 DIAGNOSIS — Z23 NEED FOR INFLUENZA VACCINATION: ICD-10-CM

## 2024-10-09 DIAGNOSIS — Z17.0 MALIGNANT NEOPLASM OF UPPER-OUTER QUADRANT OF LEFT BREAST IN FEMALE, ESTROGEN RECEPTOR POSITIVE: Chronic | ICD-10-CM

## 2024-10-09 DIAGNOSIS — C50.412 MALIGNANT NEOPLASM OF UPPER-OUTER QUADRANT OF LEFT BREAST IN FEMALE, ESTROGEN RECEPTOR POSITIVE: Chronic | ICD-10-CM

## 2024-10-09 DIAGNOSIS — I10 ESSENTIAL HYPERTENSION: Chronic | ICD-10-CM

## 2024-10-09 DIAGNOSIS — E78.49 OTHER HYPERLIPIDEMIA: Chronic | ICD-10-CM

## 2024-10-09 PROBLEM — K56.609 INTESTINAL OBSTRUCTION: Status: RESOLVED | Noted: 2023-02-03 | Resolved: 2024-10-09

## 2024-10-09 PROBLEM — K59.1 FUNCTIONAL DIARRHEA: Status: RESOLVED | Noted: 2019-10-16 | Resolved: 2024-10-09

## 2024-10-09 PROCEDURE — 90662 IIV NO PRSV INCREASED AG IM: CPT | Performed by: INTERNAL MEDICINE

## 2024-10-09 PROCEDURE — 3078F DIAST BP <80 MM HG: CPT | Performed by: INTERNAL MEDICINE

## 2024-10-09 PROCEDURE — 1159F MED LIST DOCD IN RCRD: CPT | Performed by: INTERNAL MEDICINE

## 2024-10-09 PROCEDURE — 1170F FXNL STATUS ASSESSED: CPT | Performed by: INTERNAL MEDICINE

## 2024-10-09 PROCEDURE — 3074F SYST BP LT 130 MM HG: CPT | Performed by: INTERNAL MEDICINE

## 2024-10-09 PROCEDURE — 1160F RVW MEDS BY RX/DR IN RCRD: CPT | Performed by: INTERNAL MEDICINE

## 2024-10-09 PROCEDURE — G0008 ADMIN INFLUENZA VIRUS VAC: HCPCS | Performed by: INTERNAL MEDICINE

## 2024-10-09 PROCEDURE — G0439 PPPS, SUBSEQ VISIT: HCPCS | Performed by: INTERNAL MEDICINE

## 2024-10-09 PROCEDURE — 1126F AMNT PAIN NOTED NONE PRSNT: CPT | Performed by: INTERNAL MEDICINE

## 2024-10-09 NOTE — PROGRESS NOTES
Subjective   The ABCs of the Annual Wellness Visit  Medicare Wellness Visit      Helena Romero is a 74 y.o. patient who presents for a Medicare Wellness Visit.    The following portions of the patient's history were reviewed and   updated as appropriate: allergies, current medications, past family history, past medical history, past social history, past surgical history, and problem list.    Compared to one year ago, the patient's physical   health is the same.  Compared to one year ago, the patient's mental   health is the same.    Recent Hospitalizations:  She was not admitted to the hospital during the last year.     Current Medical Providers:  Patient Care Team:  Ally Lagos MD as PCP - General (Internal Medicine)  Kyle Prince MD as Consulting Physician (Cardiology)  Inés Jones MD as Surgeon (Breast Surgery)  Manny Sheets MD as Consulting Physician (Otolaryngology)    Outpatient Medications Prior to Visit   Medication Sig Dispense Refill    Acetaminophen (TYLENOL 8 HOUR ARTHRITIS PAIN PO) Take 650 mg by mouth Daily As Needed.      albuterol sulfate  (90 Base) MCG/ACT inhaler Inhale 2 puffs Every 4 (Four) Hours As Needed for Wheezing. 18 g 1    atorvastatin (LIPITOR) 20 MG tablet TAKE 1 TABLET BY MOUTH DAILY 90 tablet 1    busPIRone (BUSPAR) 15 MG tablet TAKE 1 TABLET BY MOUTH THREE TIMES DAILY 270 tablet 1    exemestane (AROMASIN) 25 MG tablet TAKE 1 TABLET BY MOUTH DAILY 90 tablet 1    flecainide (TAMBOCOR) 50 MG tablet Take 1 tablet by mouth 2 (Two) Times a Day.      fluticasone-salmeterol (Advair HFA) 115-21 MCG/ACT inhaler Inhale 2 puffs 2 (Two) Times a Day. 12 g 11    gabapentin (NEURONTIN) 100 MG capsule TAKE 3 CAPSULES BY MOUTH EVERY NIGHT 90 capsule 3    hydroCHLOROthiazide (HYDRODIURIL) 12.5 MG tablet Take 1 tablet by mouth Daily. 90 tablet 1    HYDROcodone-acetaminophen (NORCO) 5-325 MG per tablet Take 1 tablet by mouth Every 6 (Six) Hours As Needed for  Moderate Pain. 30 tablet 0    metoprolol succinate XL (TOPROL-XL) 50 MG 24 hr tablet Take 1 tablet by mouth 2 (Two) Times a Day.      mupirocin (BACTROBAN) 2 % ointment APPLY INSIDE NOSTRILS AS DIRECTED TWICE DAILY      olmesartan (BENICAR) 40 MG tablet TAKE 1 TABLET BY MOUTH DAILY 90 tablet 1    omeprazole (priLOSEC) 20 MG capsule TAKE 1 CAPSULE BY MOUTH DAILY 90 capsule 1    polyethylene glycol (MiraLax) 17 GM/SCOOP powder Take 17 g by mouth Daily. Take cap of powder with 8oz water daily surrounding surgery and while on narcotic 119 g 0    rivaroxaban (XARELTO) 20 MG tablet Take 1 tablet by mouth Daily.      traZODone (DESYREL) 50 MG tablet TAKE 1 TO 2 TABLETS BY MOUTH EVERY NIGHT AT BEDTIME AS NEEDED 60 tablet 3     No facility-administered medications prior to visit.     Opioid medication/s are on active medication list.  and I have evaluated her active treatment plan and pain score trends (see table).  There were no vitals filed for this visit.  I have reviewed the chart for potential of high risk medication and harmful drug interactions in the elderly.        Aspirin is not on active medication list.  Aspirin use is not indicated based on review of current medical condition/s. Risk of harm outweighs potential benefits.  .    Patient Active Problem List   Diagnosis    Essential hypertension    Other hyperlipidemia    Mitral insufficiency    Obesity    History of small bowel obstruction    Gastroesophageal reflux disease    Paroxysmal atrial fibrillation    Chronic anticoagulation    Encounter for monitoring flecainide therapy    Malignant neoplasm of upper-outer quadrant of left breast in female, estrogen receptor positive    Polyp of colon    Osteopenia    Allergic rhinitis    Moderate persistent asthma without complication     Advance Care Planning Advance Directive is not on file.  ACP discussion was held with the patient during this visit. Patient does not have an advance directive, information provided.   "          Objective   Vitals:    10/09/24 1329   BP: 120/78   Pulse: 74   Weight: 109 kg (240 lb)   Height: 162.6 cm (64\")       Estimated body mass index is 41.2 kg/m² as calculated from the following:    Height as of this encounter: 162.6 cm (64\").    Weight as of this encounter: 109 kg (240 lb).    Class 3 Severe Obesity (BMI >=40). Obesity-related health conditions include the following: hypertension, impaired fasting glucose, dyslipidemias, and osteoarthritis. Obesity is unchanged. BMI is is above average; BMI management plan is completed. We discussed portion control and increasing exercise.       Does the patient have evidence of cognitive impairment? No                                                                                                Health  Risk Assessment    Smoking Status:  Social History     Tobacco Use   Smoking Status Never   Smokeless Tobacco Never     Alcohol Consumption:  Social History     Substance and Sexual Activity   Alcohol Use No       Fall Risk Screen  STEADI Fall Risk Assessment was completed, and patient is at LOW risk for falls.Assessment completed on:10/9/2024    Depression Screening:      10/9/2024     1:32 PM   PHQ-2/PHQ-9 Depression Screening   Little Interest or Pleasure in Doing Things 0-->not at all   Feeling Down, Depressed or Hopeless 0-->not at all   PHQ-9: Brief Depression Severity Measure Score 0     Health Habits and Functional and Cognitive Screening:      10/9/2024     1:31 PM   Functional & Cognitive Status   Do you have difficulty preparing food and eating? No   Do you have difficulty bathing yourself, getting dressed or grooming yourself? No   Do you have difficulty using the toilet? No   Do you have difficulty moving around from place to place? No   Do you have trouble with steps or getting out of a bed or a chair? No   Current Diet Well Balanced Diet   Dental Exam Up to date   Eye Exam Up to date   Exercise (times per week) 0 times per week   Current " Exercises Include No Regular Exercise   Do you need help using the phone?  No   Are you deaf or do you have serious difficulty hearing?  No   Do you need help to go to places out of walking distance? No   Do you need help shopping? No   Do you need help preparing meals?  No   Do you need help with housework?  No   Do you need help with laundry? No   Do you need help taking your medications? No   Do you need help managing money? No   Do you ever drive or ride in a car without wearing a seat belt? No   Have you felt unusual stress, anger or loneliness in the last month? No   Who do you live with? Spouse   If you need help, do you have trouble finding someone available to you? No   Have you been bothered in the last four weeks by sexual problems? No   Do you have difficulty concentrating, remembering or making decisions? No           Age-appropriate Screening Schedule:  Refer to the list below for future screening recommendations based on patient's age, sex and/or medical conditions. Orders for these recommended tests are listed in the plan section. The patient has been provided with a written plan.    Health Maintenance List  Health Maintenance   Topic Date Due    ZOSTER VACCINE (1 of 2) Never done    HEPATITIS C SCREENING  Never done    COVID-19 Vaccine (6 - 2023-24 season) 09/01/2024    DXA SCAN  01/24/2025    LIPID PANEL  07/09/2025    ANNUAL WELLNESS VISIT  10/09/2025    BMI FOLLOWUP  10/09/2025    TDAP/TD VACCINES (2 - Td or Tdap) 06/26/2027    RSV Vaccine - Adults  Completed    INFLUENZA VACCINE  Completed    Pneumococcal Vaccine 65+  Completed    COLORECTAL CANCER SCREENING  Discontinued                                                                                                                                                CMS Preventative Services Quick Reference  Risk Factors Identified During Encounter  Immunizations Discussed/Encouraged: Influenza and Shingrix    The above risks/problems have been  "discussed with the patient.  Pertinent information has been shared with the patient in the After Visit Summary.  An After Visit Summary and PPPS were made available to the patient.    Follow Up:   Next Medicare Wellness visit to be scheduled in 1 year.         Additional E&M Note during same encounter follows:  Patient has additional, significant, and separately identifiable condition(s)/problem(s) that require work above and beyond the Medicare Wellness Visit     Chief Complaint  Medicare Wellness-subsequent    Subjective   HPI  Juliana is also being seen today for additional medical problem/s.    Frustrated about new oncologist- [place].   Feels better overall- says she has more stamina.             Objective   Vital Signs:  /78   Pulse 74   Ht 162.6 cm (64\")   Wt 109 kg (240 lb)   BMI 41.20 kg/m²   Physical Exam  Constitutional:       Appearance: Normal appearance.   Cardiovascular:      Rate and Rhythm: Normal rate and regular rhythm.   Pulmonary:      Effort: Pulmonary effort is normal.      Breath sounds: Normal breath sounds.   Chest:   Breasts:     Right: Normal.      Left: Inverted nipple present.   Musculoskeletal:      Right lower leg: No edema.      Left lower leg: No edema.   Lymphadenopathy:      Cervical: No cervical adenopathy.      Upper Body:      Right upper body: No supraclavicular, axillary or pectoral adenopathy.      Left upper body: No supraclavicular, axillary or pectoral adenopathy.                 Assessment and Plan               Need for influenza vaccination    Essential hypertension    Other hyperlipidemia     Class 3 severe obesity due to excess calories with serious comorbidity and body mass index (BMI) of 40.0 to 44.9 in adult  Patient's (Body mass index is 41.2 kg/m².) indicates that they are morbidly/severely obese (BMI > 40 or > 35 with obesity - related health condition) with health conditions that include hypertension, dyslipidemias, and osteoarthritis " . Weight is unchanged. BMI  is above average; BMI management plan is completed. We discussed portion control and increasing exercise.   Malignant neoplasm of upper-outer quadrant of left breast in female, estrogen receptor positive    Medicare annual wellness visit, subsequent    Diagnoses and all orders for this visit:    1. Medicare annual wellness visit, subsequent (Primary)  Comments:  Reviewed vaccines- no chagnes made. Cont working on dietary changes to get weight down.    2. Need for influenza vaccination  -     Fluzone High-Dose 65+yrs    3. Essential hypertension  Comments:  controlled, no change  Assessment & Plan:        4. Other hyperlipidemia  Comments:  labs have been great, no change.  Assessment & Plan:         5. Class 3 severe obesity due to excess calories with serious comorbidity and body mass index (BMI) of 40.0 to 44.9 in adult  Comments:  very active, needs to watch caloric intake!  Assessment & Plan:  Patient's (Body mass index is 41.2 kg/m².) indicates that they are  with health conditions that include  . Weight is . BMI  . We discussed .       6. Malignant neoplasm of upper-outer quadrant of left breast in female, estrogen receptor positive  Comments:  encouraged to cont to see oncologist until off Aromasin  Overview:  [id number], lt side                 Follow Up   Return in about 6 months (around 4/9/2025) for Recheck.  Patient was given instructions and counseling regarding her condition or for health maintenance advice. Please see specific information pulled into the AVS if appropriate.

## 2024-12-05 ENCOUNTER — TELEPHONE (OUTPATIENT)
Dept: INTERNAL MEDICINE | Facility: CLINIC | Age: 74
End: 2024-12-05
Payer: MEDICARE

## 2024-12-05 DIAGNOSIS — R05.1 ACUTE COUGH: Primary | ICD-10-CM

## 2024-12-05 RX ORDER — HYDROCODONE BITARTRATE AND HOMATROPINE METHYLBROMIDE ORAL SOLUTION 5; 1.5 MG/5ML; MG/5ML
2.5 LIQUID ORAL EVERY 6 HOURS PRN
Qty: 120 ML | Refills: 0 | Status: SHIPPED | OUTPATIENT
Start: 2024-12-05

## 2024-12-05 NOTE — TELEPHONE ENCOUNTER
"Caller: Helena Romero \"Juliana\"    Relationship: Self    Best call back number: [phone]     What medication are you requesting: COUGH MEDICATION    What are your current symptoms: PERSISTENT COUGH / YELLOW MUCUS    How long have you been experiencing symptoms: A FEW DAYS     Have you had these symptoms before:    [] Yes  [] No    Have you been treated for these symptoms before:   [] Yes  [] No    If a prescription is needed, what is your preferred pharmacy and phone number: [id number] - [place], [place] - [address]  AT [facility] [address] - [phone] [facility] [phone]      Additional notes: PLEASE ADVISE PATIENT STATES THAT DUE TO HER ASTHMA AND THE COLD SHE PREFERS NOT TO LEAVE THE HOUSE AT THIS TIME.        "

## 2024-12-16 ENCOUNTER — HOSPITAL ENCOUNTER (OUTPATIENT)
Facility: HOSPITAL | Age: 74
Discharge: HOME | End: 2024-12-16
Admitting: INTERNAL MEDICINE
Payer: MEDICARE

## 2024-12-16 ENCOUNTER — OFFICE VISIT (OUTPATIENT)
Dept: INTERNAL MEDICINE | Facility: CLINIC | Age: 74
End: 2024-12-16
Payer: MEDICARE

## 2024-12-16 VITALS
WEIGHT: 232 LBS | BODY MASS INDEX: 39.61 KG/M2 | HEART RATE: 84 BPM | HEIGHT: 64 IN | DIASTOLIC BLOOD PRESSURE: 68 MMHG | SYSTOLIC BLOOD PRESSURE: 142 MMHG

## 2024-12-16 DIAGNOSIS — R05.2 SUBACUTE COUGH: Primary | ICD-10-CM

## 2024-12-16 DIAGNOSIS — K21.9 GASTROESOPHAGEAL REFLUX DISEASE, UNSPECIFIED WHETHER ESOPHAGITIS PRESENT: ICD-10-CM

## 2024-12-16 DIAGNOSIS — K52.1 ANTIBIOTIC-ASSOCIATED DIARRHEA: Chronic | ICD-10-CM

## 2024-12-16 DIAGNOSIS — T36.95XA ANTIBIOTIC-ASSOCIATED DIARRHEA: Chronic | ICD-10-CM

## 2024-12-16 PROCEDURE — 99214 OFFICE O/P EST MOD 30 MIN: CPT | Performed by: INTERNAL MEDICINE

## 2024-12-16 PROCEDURE — 1126F AMNT PAIN NOTED NONE PRSNT: CPT | Performed by: INTERNAL MEDICINE

## 2024-12-16 PROCEDURE — 96372 THER/PROPH/DIAG INJ SC/IM: CPT | Performed by: INTERNAL MEDICINE

## 2024-12-16 PROCEDURE — 3077F SYST BP >= 140 MM HG: CPT | Performed by: INTERNAL MEDICINE

## 2024-12-16 PROCEDURE — 71046 X-RAY EXAM CHEST 2 VIEWS: CPT

## 2024-12-16 PROCEDURE — 3078F DIAST BP <80 MM HG: CPT | Performed by: INTERNAL MEDICINE

## 2024-12-16 RX ORDER — METHYLPREDNISOLONE ACETATE 40 MG/ML
80 INJECTION, SUSPENSION INTRA-ARTICULAR; INTRALESIONAL; INTRAMUSCULAR; SOFT TISSUE ONCE
Status: COMPLETED | OUTPATIENT
Start: 2024-12-16 | End: 2024-12-16

## 2024-12-16 RX ORDER — PANTOPRAZOLE SODIUM 40 MG/1
40 TABLET, DELAYED RELEASE ORAL DAILY
Qty: 90 TABLET | Refills: 1 | Status: SHIPPED | OUTPATIENT
Start: 2024-12-16

## 2024-12-16 RX ADMIN — METHYLPREDNISOLONE ACETATE 80 MG: 40 INJECTION, SUSPENSION INTRA-ARTICULAR; INTRALESIONAL; INTRAMUSCULAR; SOFT TISSUE at 13:25

## 2024-12-16 NOTE — PROGRESS NOTES
"Chief Complaint  Cough (Patient report cough and headache behind her eyes, and just feeling over all achy since October when she went to , still not better.)    Subjective        Helena Romero presents to [facility]  Cough      continued cough since being seen in [facility] late October- she was given abx and steroids- doesn't think the steroids were long enough. The antibiotic upset her stomach- Augmentin- with diarrhea, upset stomach- she hasn't been able to eat well- taking protein drinks, soft food, etc. She feels like she's doing a lot of shaking.  Does feel better when she gets something to eat. Still coughing a lot of clear phlegm, that does make her feel better when she does. Doing breathing treatments BID, humidifier. Lots of sinus congestion, sinus headache.   Also had a flare of her known adhesive issues in the colon- treats with prn Miralax.   Loose stools in the am, doesn't have diarrhea later in the day.   Falling asleep easily when she's sitting up, coughing when she lies down. Has been sleeping in recliner.     Objective   Vital Signs:  /68   Pulse 84   Ht 162.6 cm (64.02\")   Wt 105 kg (232 lb)   BMI 39.80 kg/m²   Estimated body mass index is 39.8 kg/m² as calculated from the following:    Height as of this encounter: 162.6 cm (64.02\").    Weight as of this encounter: 105 kg (232 lb).            Physical Exam  Constitutional:       Appearance: Normal appearance. She is not ill-appearing.   HENT:      Nose: Congestion and rhinorrhea present.      Mouth/Throat:      Mouth: Mucous membranes are moist.      Pharynx: Oropharynx is clear.   Cardiovascular:      Rate and Rhythm: Normal rate.   Pulmonary:      Effort: Pulmonary effort is normal.      Breath sounds: No wheezing.   Musculoskeletal:      Right lower leg: No edema.      Left lower leg: No edema.   Lymphadenopathy:      Cervical: No cervical adenopathy.        Result Review :                Assessment and Plan "   Diagnoses and all orders for this visit:    1. Subacute cough (Primary)  Comments:  check CXr- I think it is mainly sinus- rinses, etc,cont with fluids- steroid inj given (usually takes higher oral dose)- would like to avoid given stomach issue  Orders:  -     XR Chest PA & Lateral  -     methylPREDNISolone acetate (DEPO-medrol) injection 80 mg    2. Antibiotic-associated diarrhea  Comments:  add probiotic    3. Gastroesophageal reflux disease, unspecified whether esophagitis present  Comments:  switch PPI to pantoprazole- avoid steroids, nsaids.  Orders:  -     pantoprazole (Protonix) 40 MG EC tablet; Take 1 tablet by mouth Daily.  Dispense: 90 tablet; Refill: 1             Follow Up   No follow-ups on file.  Patient was given instructions and counseling regarding her condition or for health maintenance advice. Please see specific information pulled into the AVS if appropriate.

## 2024-12-18 ENCOUNTER — TELEPHONE (OUTPATIENT)
Dept: INTERNAL MEDICINE | Facility: CLINIC | Age: 74
End: 2024-12-18
Payer: MEDICARE

## 2024-12-18 NOTE — TELEPHONE ENCOUNTER
"  Caller: Helena Romero \"Juliana\"    Relationship to patient: Self    Best call back number: [phone]      Patient is needing:   PATIENT CALLED IN STATING HER STOMACH AND BREATHING IS BETTER.     SHE WOULD LIKE A CALL BACK.               "

## 2024-12-20 ENCOUNTER — OFFICE VISIT (OUTPATIENT)
Dept: ORTHOPEDIC SURGERY | Facility: CLINIC | Age: 74
End: 2024-12-20
Payer: MEDICARE

## 2024-12-20 VITALS
DIASTOLIC BLOOD PRESSURE: 81 MMHG | BODY MASS INDEX: 39.61 KG/M2 | SYSTOLIC BLOOD PRESSURE: 136 MMHG | WEIGHT: 232 LBS | HEIGHT: 64 IN | OXYGEN SATURATION: 93 % | HEART RATE: 65 BPM

## 2024-12-20 DIAGNOSIS — M17.11 OSTEOARTHRITIS OF RIGHT KNEE, UNSPECIFIED OSTEOARTHRITIS TYPE: ICD-10-CM

## 2024-12-20 DIAGNOSIS — M25.561 RIGHT KNEE PAIN, UNSPECIFIED CHRONICITY: Primary | ICD-10-CM

## 2024-12-20 NOTE — PROGRESS NOTES
"Chief Complaint  Initial Evaluation of the Right Knee     Subjective      Helena Romero presents to [facility] for initial evaluation of the right knee. Her knee hyper extended 7 - 8 months ago while getting in a truck.  She had worn a brace in the past and that relieved the pain.  She has been in and out of tall truck doing some [place] shopping and the pain has flared up again.     Allergies   Allergen Reactions    Codeine Nausea Only    Lisinopril Hives    Azithromycin Unknown - Low Severity        Social History     Socioeconomic History    Marital status:    Tobacco Use    Smoking status: Never    Smokeless tobacco: Never   Vaping Use    Vaping status: Never Used   Substance and Sexual Activity    Alcohol use: No    Drug use: No    Sexual activity: Defer        I reviewed the patient's chief complaint, history of present illness, review of systems, past medical history, surgical history, family history, social history, medications, and allergy list.     Review of Systems     Constitutional: Denies fevers, chills, weight loss  Cardiovascular: Denies chest pain, shortness of breath  Skin: Denies rashes, acute skin changes  Neurologic: Denies headache, loss of consciousness        Vital Signs:   /81   Pulse 65   Ht 162.6 cm (64\")   Wt 105 kg (232 lb)   SpO2 93%   BMI 39.82 kg/m²          Physical Exam  General: Alert. No acute distress    Ortho Exam        RIGHT KNEE Flexion 120. Extension 0. Stable to varus/valgus stress. Stable to anterior/posterior drawer. Neurovascularly intact. Negative Edelmira. Negative Lachman. Positive EHL, FHL, HS and TA. Sensation intact to light touch all 5 nerves of the foot. Ambulates with Antalgic gait. Patella is well tracking. Calf supple, non-tender. Positive tenderness to the medial joint line. Negative tenderness to the lateral joint line. Negative Crepitus. Good strength to hamstrings, quadriceps, dorsiflexors, and plantar " flexors.  Knee Extensor Mechanism intact Mild swelling.         Procedures      Imaging Results (Most Recent)       Procedure Component Value Units Date/Time    XR Knee 3 View Right [[id number]] Resulted: 12/20/24 0851     Updated: 12/20/24 0853             Result Review :       X-Ray Report:  Right knee X-Ray  Indication: Evaluation of the right knee  AP/Lateral and Hunker view(s)  Findings: Mild degenerative changes to the lateral and medial joint lines.   Moderate degenerative changes in the patella femoral line.  Mild patella tilt.    Prior studies available for comparison: no             Assessment and Plan     Diagnoses and all orders for this visit:    1. Right knee pain, unspecified chronicity (Primary)  -     XR Knee 3 View Right    2. Osteoarthritis of right knee, unspecified osteoarthritis type        Discussed the treatment plan with the patient. I reviewed the X-rays that were obtained today with the patient.     Brace given.  HEP exercises given.  Prescribed physical therapy.        Call or return if worsening symptoms.    Follow Up     PRN      Patient was given instructions and counseling regarding her condition or for health maintenance advice. Please see specific information pulled into the AVS if appropriate.     Scribed for Dione Vega MD by Marlys Hernandez MA.  12/20/24   08:53 EST    I have personally performed the services described in this document as scribed by the above individual and it is both accurate and complete. Dione Vega MD 12/20/24

## 2025-01-20 ENCOUNTER — TELEPHONE (OUTPATIENT)
Dept: INTERNAL MEDICINE | Facility: CLINIC | Age: 75
End: 2025-01-20
Payer: MEDICARE

## 2025-01-20 DIAGNOSIS — J45.40 MODERATE PERSISTENT ASTHMA WITHOUT COMPLICATION: Primary | ICD-10-CM

## 2025-01-20 RX ORDER — ALBUTEROL SULFATE 0.83 MG/ML
2.5 SOLUTION RESPIRATORY (INHALATION) EVERY 4 HOURS PRN
Qty: 9 ML | Refills: 1 | Status: SHIPPED | OUTPATIENT
Start: 2025-01-20

## 2025-01-20 NOTE — TELEPHONE ENCOUNTER
"    Caller: Helena Romero \"Juliana\"    Relationship: Self    Best call back number: [phone]     Which medication are you concerned about: albuterol (PROVENTIL) (2.5 MG/3ML) 0.083% nebulizer solution     Who prescribed you this medication: ZENON HARTMAN        What are your concerns: PATIENT IS CALLING TO STATE THE PHARMACY TOLD HER THAT THEY NEED A PRIOR AUTHORIZATION FOR THE ABOVE MEDICATION.    [id number] [place] - [address]  AT [facility] [address] - [phone]  [phone]     SHE HAS NEW PRESCRIPTION COVERAGE THROUGH   [employer]  [address]   [facility]  MEMBER ID [id number]  EFFECTIVE DATE 01/01/2025    PLEASE ADVISE.        "

## 2025-01-20 NOTE — TELEPHONE ENCOUNTER
Called patient and advised per Myla we can not do a video visit it would have to be in person for her green mucus she states she can not come in person due to the cold and her being an hour away. She asks if you would send something to the pharmacy for her

## 2025-01-20 NOTE — TELEPHONE ENCOUNTER
PATIENT CALLED FOR MEDICATION REFILL OF  albuterol (PROVENTIL) (2.5 MG/3ML) 0.083% nebulizer solution   NOT ON CURRENT MED LIST    SHE IS HAVING ASTHMA ISSUES THIS MORNING    CALL BACK NUMBER  [phone]    [id number] - [place] - [address] AT [facility] [address] - [phone]  - [phone]

## 2025-01-29 ENCOUNTER — OFFICE VISIT (OUTPATIENT)
Dept: ONCOLOGY | Facility: HOSPITAL | Age: 75
End: 2025-01-29
Payer: MEDICARE

## 2025-01-29 VITALS
WEIGHT: 235.6 LBS | HEART RATE: 61 BPM | TEMPERATURE: 97.4 F | SYSTOLIC BLOOD PRESSURE: 169 MMHG | HEIGHT: 64 IN | DIASTOLIC BLOOD PRESSURE: 65 MMHG | RESPIRATION RATE: 18 BRPM | BODY MASS INDEX: 40.22 KG/M2 | OXYGEN SATURATION: 93 %

## 2025-01-29 DIAGNOSIS — Z17.0 MALIGNANT NEOPLASM OF LOWER-OUTER QUADRANT OF LEFT BREAST OF FEMALE, ESTROGEN RECEPTOR POSITIVE: Primary | ICD-10-CM

## 2025-01-29 DIAGNOSIS — Z12.31 ENCOUNTER FOR SCREENING MAMMOGRAM FOR MALIGNANT NEOPLASM OF BREAST: ICD-10-CM

## 2025-01-29 DIAGNOSIS — C50.512 MALIGNANT NEOPLASM OF LOWER-OUTER QUADRANT OF LEFT BREAST OF FEMALE, ESTROGEN RECEPTOR POSITIVE: Primary | ICD-10-CM

## 2025-01-29 PROCEDURE — G0463 HOSPITAL OUTPT CLINIC VISIT: HCPCS | Performed by: NURSE PRACTITIONER

## 2025-01-29 RX ORDER — EXEMESTANE 25 MG/1
25 TABLET ORAL DAILY
Qty: 90 TABLET | Refills: 1 | Status: SHIPPED | OUTPATIENT
Start: 2025-01-29

## 2025-01-29 NOTE — PROGRESS NOTES
Chief Complaint/Reason for Referral:  Breast cancer follow up.     Ally Lagos MD Ellis, Julie T, MD    Records Obtained:  Records of the patients history including those obtained from  [facility] and patient information were reviewed and summarized in detail.    Subjective    History of Present Illness  The patient is a very pleasant 74-year-old  female who presents for follow-up for breast cancer.    She was diagnosed with left breast cancer in 2020, which was treated with lumpectomy and radiation. Genetic testing yielded negative results. She continues the Exemestane 1 tab QD and is tolerating well. Her last mammogram and DEXA scan were both normal. She has experienced weight loss since her last visit. She initially started on anastrozole in December 2020 but later switched to exemestane, which she tolerates better.     She reported experiencing severe diarrhea this morning, a symptom that has persisted for several years. She is curious if this could be a side effect of her chemotherapy medication. She has a known blockage above her navel, which is currently not obstructing. Due to the blockage, she is unable to take any medication to halt the diarrhea. She had approximately 10 episodes of diarrhea today, which is consistent with her usual pattern. Despite having a colonoscopy about a year ago, no further action has been taken to address her diarrhea. Her  suspects that her diet may be contributing to her symptoms. Previously, red meat would trigger her diarrhea, but now even chicken seems to have the same effect. She has lost weight and is interested in quantifying the loss since her last visit. She avoids eating when she has to travel to prevent accidents. She has been working from home for the past 3 years and plans to retire early due to her health issues. She recalls a hospitalization lasting 6 days several years ago.    Supplemental Information  She has been experiencing exacerbations of her  asthma throughout the winter season.    MEDICATIONS  Current: exemestane  Discontinued: anastrozole        Cancer Staging   No matching staging information was found for the patient.       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  ER/TX positive at 95%, HER-2 negative (0 by IHC), Ki-67 approximately 5%.  -Consult slide review at [facility] on 9/17/2020 showed essentially identical results.  -11/5/2020: Lumpectomy with sentinel lymph node biopsy on the left.  There is a 12 mm area of invasive mucinous carcinoma and a 6 mm area of low-grade DCIS.  Margins negative.  -Completed radiation therapy (Dr. Goldman)  [facility] was negative per patient report.  -Anastrozole started 12/3/2020 - stopped due to leg swelling  --switched to exemestane in August 2021      Malignant neoplasm of lower-outer quadrant of left breast of female, estrogen receptor positive (Resolved)   9/30/2020 Initial Diagnosis    Malignant neoplasm of lower-outer quadrant of left breast of female, estrogen receptor positive (CMS/HCC)         Review of Systems   Constitutional:  Positive for fatigue.   HENT: Negative.     Eyes: Negative.    Respiratory: Negative.     Gastrointestinal:  Positive for diarrhea.   Endocrine: Negative.    Genitourinary: Negative.    Musculoskeletal: Negative.    Skin: Negative.    Allergic/Immunologic: Negative.    Neurological: Negative.    Hematological: Negative.    Psychiatric/Behavioral:  The patient is nervous/anxious.    All other systems reviewed and are negative.      Current Outpatient Medications on File Prior to Visit   Medication Sig Dispense Refill    Acetaminophen (TYLENOL 8 HOUR  ARTHRITIS PAIN PO) Take 650 mg by mouth Daily As Needed.      albuterol (PROVENTIL) (2.5 MG/3ML) 0.083% nebulizer solution Take 2.5 mg by nebulization Every 4 (Four) Hours As Needed for Wheezing. 9 mL 1    albuterol sulfate  (90 Base) MCG/ACT inhaler Inhale 2 puffs Every 4 (Four) Hours As Needed for Wheezing. 18 g 1    atorvastatin (LIPITOR) 20 MG tablet TAKE 1 TABLET BY MOUTH DAILY 90 tablet 1    busPIRone (BUSPAR) 15 MG tablet TAKE 1 TABLET BY MOUTH THREE TIMES DAILY 270 tablet 1    flecainide (TAMBOCOR) 50 MG tablet Take 1 tablet by mouth 2 (Two) Times a Day.      fluticasone-salmeterol (Advair HFA) 115-21 MCG/ACT inhaler Inhale 2 puffs 2 (Two) Times a Day. 12 g 11    gabapentin (NEURONTIN) 100 MG capsule TAKE 3 CAPSULES BY MOUTH EVERY NIGHT 90 capsule 3    hydroCHLOROthiazide (HYDRODIURIL) 12.5 MG tablet Take 1 tablet by mouth Daily. 90 tablet 1    metoprolol succinate XL (TOPROL-XL) 50 MG 24 hr tablet Take 1 tablet by mouth 2 (Two) Times a Day.      mupirocin (BACTROBAN) 2 % ointment APPLY INSIDE NOSTRILS AS DIRECTED TWICE DAILY      olmesartan (BENICAR) 40 MG tablet TAKE 1 TABLET BY MOUTH DAILY 90 tablet 1    pantoprazole (Protonix) 40 MG EC tablet Take 1 tablet by mouth Daily. 90 tablet 1    polyethylene glycol (MiraLax) 17 GM/SCOOP powder Take 17 g by mouth Daily. Take cap of powder with 8oz water daily surrounding surgery and while on narcotic 119 g 0    rivaroxaban (XARELTO) 20 MG tablet Take 1 tablet by mouth Daily.      traZODone (DESYREL) 50 MG tablet TAKE 1 TO 2 TABLETS BY MOUTH EVERY NIGHT AT BEDTIME AS NEEDED 60 tablet 3    [DISCONTINUED] exemestane (AROMASIN) 25 MG tablet TAKE 1 TABLET BY MOUTH DAILY 90 tablet 1    HYDROcodone Bit-Homatrop MBr (HYCODAN) 5-1.5 MG/5ML solution Take 2.5 mL by mouth Every 6 (Six) Hours As Needed for Cough. 120 mL 0    HYDROcodone-acetaminophen (NORCO) 5-325 MG per tablet Take 1 tablet by mouth Every 6 (Six) Hours As Needed for Moderate Pain. 30 tablet 0     omeprazole (priLOSEC) 20 MG capsule TAKE 1 CAPSULE BY MOUTH DAILY (Patient not taking: Reported on 1/29/2025) 90 capsule 1     No current facility-administered medications on file prior to visit.       Allergies   Allergen Reactions    Codeine Nausea Only    Lisinopril Hives    Azithromycin Unknown - Low Severity     Past Medical History:   Diagnosis Date    Acute bronchitis     Allergic     Anesthesia complication     PATIENT STAYS SLEEPY A LONGTIME AFTER SURGERY    Anxiety     Asthma     Atrial fibrillation     Borderline hyperglycemia     Breast cancer     Cat bite     Depression     Diarrhea     Encounter for monitoring flecainide therapy 10/21/2022    GERD (gastroesophageal reflux disease)     Headache     Hypertension     Hypocalcemia     Insomnia     Low back pain     Low back pain, episodic     Need for diphtheria-tetanus-pertussis (Tdap) vaccine     Neuromuscular disorder     Nosebleed     Obesity     Osteoarthritis, generalized     Panic attack     Pneumonia     x 3    Seasonal allergies     Sleep disturbances     Trochanteric bursitis     Vaginal candidiasis     Yeast infection      Past Surgical History:   Procedure Laterality Date    BREAST BIOPSY Left 2020    MALIGNANT    BREAST LUMPECTOMY WITH SENTINEL NODE BIOPSY Left 11/5/2020    Procedure: Left breast charley localized lumpectomy and left axillary sentinel lymph node biopsy;  Surgeon: Inés Jones MD;  Location: [facility] MAIN OR;  Service: General;  Laterality: Left;    CERVICAL CONIZATION, LEEP      CHOLECYSTECTOMY      COLONOSCOPY  approx 2011    normal per patient-repeat 10 years    COLONOSCOPY N/A 6/17/2022    Procedure: COLONOSCOPY INTO CECUM AND T.I. WITH COLD SNARE AND COLD BIOPSY POLYPECTOMIES AND 2CC TATTOO PLACEMENT;  Surgeon: Marlene Amin MD;  Location: [facility] ENDOSCOPY;  Service: Gastroenterology;  Laterality: N/A;  PRE- POSITIVE COLOGUARD  POST- HEMORRHOIDS, DIVERTICULOSIS, POLYPS, TRANSVERSE MULTILOBE FLAT POLYP NOT REMOVED-  "TATTOO PLACED    DILATION AND CURETTAGE, DIAGNOSTIC / THERAPEUTIC      TONSILLECTOMY      TUBAL ABDOMINAL LIGATION       Social History     Socioeconomic History    Marital status:    Tobacco Use    Smoking status: Never    Smokeless tobacco: Never   Vaping Use    Vaping status: Never Used   Substance and Sexual Activity    Alcohol use: No    Drug use: No    Sexual activity: Defer     Family History   Problem Relation Age of Onset    Pancreatic cancer Paternal Aunt     Cervical cancer Mother 41    Cancer Mother         Cervical Cancer    Melanoma Sister 60    Thyroid cancer Sister 60    Breast cancer Niece 38        TRIPLE -    Malig Hyperthermia Neg Hx      Immunization History   Administered Date(s) Administered    Arexvy (RSV, Adults 60+ yrs) 01/29/2024    COVID-19 (MODERNA) 1st,2nd,3rd Dose Monovalent 01/15/2021, 02/12/2021, 08/28/2021    COVID-19 (MODERNA) BIVALENT 12+YRS 11/14/2022    Covid-19 (Pfizer) Gray Cap Monovalent 05/24/2022    FLUAD TRI 65YR+ 10/01/2019    Fluad Quad 65+ 10/01/2019    Fluzone High-Dose 65+YRS 01/01/2016, 11/02/2017, 11/01/2018, 10/09/2024    Fluzone High-Dose 65+yrs 11/01/2021, 11/14/2022    Pneumococcal Conjugate 13-Valent (PCV13) 11/02/2017    Pneumococcal Polysaccharide (PPSV23) 01/06/2015    Tdap 06/26/2017       Tobacco Use: Low Risk  (1/29/2025)    Patient History     Smoking Tobacco Use: Never     Smokeless Tobacco Use: Never     Passive Exposure: Not on file       Objective     Physical Exam    Vitals:    01/29/25 1249   BP: 169/65   Pulse: 61   Resp: 18   Temp: 97.4 °F (36.3 °C)   TempSrc: Temporal   SpO2: 93%   Weight: 107 kg (235 lb 9.6 oz)   Height: 162.6 cm (64\")   PainSc: 0-No pain       Wt Readings from Last 3 Encounters:   01/29/25 107 kg (235 lb 9.6 oz)   12/20/24 105 kg (232 lb)   12/16/24 105 kg (232 lb)                 ECOG score: 0         ECOG: (0) Fully Active - Able to Carry On All Pre-disease Performance Without Restriction  Fall Risk Assessment was " "completed, and patient is at moderate risk for falls.  PHQ-9 Total Score:         The patient is  experiencing fatigue. Fatigue score: 6    PT/OT Functional Screening: PT fx screen : No needs identified  Speech Functional Screening: Speech fx screen : No needs identified  Rehab to be ordered: Rehab to be ordered : No needs identified        Result Review :  The following data was reviewed by: JOHN Hooks on 01/29/2025:  Lab Results   Component Value Date    HGB 13.1 09/13/2022    HCT 41.1 09/13/2022    MCV 88.0 09/13/2022     09/13/2022    WBC 7.71 09/13/2022    NEUTROABS 5.85 09/13/2022    LYMPHSABS 1.16 09/13/2022    MONOSABS 0.46 09/13/2022    EOSABS 0.17 09/13/2022    BASOSABS 0.05 09/13/2022     Lab Results   Component Value Date    GLUCOSE 93 07/09/2024    BUN 8 07/09/2024    CREATININE 0.86 07/09/2024     07/09/2024    K 4.0 07/09/2024     07/09/2024    CO2 25 07/09/2024    CALCIUM 9.2 07/09/2024    PROTEINTOT 7.6 09/13/2022    ALBUMIN 4.2 07/09/2024    BILITOT 0.6 07/09/2024    ALKPHOS 126 (H) 07/09/2024    AST 20 07/09/2024    ALT 16 07/09/2024     No results found for: \"LDH\", \"FERRITIN\", \"FOLATE\"  No results found for: \"IRON\", \"LABIRON\", \"TRANSFERRIN\", \"TIBC\"  No results found for: \"LDH\", \"FERRITIN\", \"[id number]\", \"FOLATE\"  No results found for: \"PSA\", \"CEA\", \"AFP\", \"\", \"\"    XR Chest PA & Lateral    Result Date: 12/17/2024  No evidence for active disease in the chest.  This report was finalized on 12/17/2024 2:21 PM by Micheal Montes M.D on Workstation: [id number]        Results             Assessment and Plan:  Diagnoses and all orders for this visit:    1. Malignant neoplasm of lower-outer quadrant of left breast of female, estrogen receptor positive (Primary)  -     Mammo Screening Digital Tomosynthesis Bilateral With CAD; Future  -     exemestane (AROMASIN) 25 MG tablet; Take 1 tablet by mouth Daily.  Dispense: 90 tablet; Refill: 1    2. Encounter for " screening mammogram for malignant neoplasm of breast  -     Mammo Screening Digital Tomosynthesis Bilateral With CAD; Future        Assessment & Plan  1. Breast Cancer.  She was diagnosed with left breast cancer in 2020 and underwent a lumpectomy and radiation. Genetic testing was negative. She started on anastrozole and later switched to exemestane. She has been on exemestane since December 2020, and 5 years will be completed in December 2024. There is some ambiguity in Dr. Cartwright's notes regarding whether the diagnosis was DCIS or invasive cancer. Further research will be conducted to clarify this. If it is confirmed as invasive cancer, a breast cancer index test will be considered. If the diagnosis is DCIS, the test will not be necessary, and treatment can be concluded at the end of December 2024. A mammogram has been ordered for August 2025. A refill for exemestane has been provided. We discussed breast cancer index testing to see if she would benefit for extended endocrine therapy. She is due a mammogram in August of 2025. Last mammogram was benign.     2. Chronic Diarrhea.  She reports having chronic diarrhea for several years, with up to 10 episodes in a day. She has a known blockage above her navel, which prevents her from taking anti-diarrheal medications due to the risk of constipation. She is currently under the care of Dr. Valladares, a gastroenterologist at [facility]. She is advised to consult with Dr. Valladares regarding her chronic diarrhea. The potential benefits of fiber supplements, such as Metamucil, were discussed, but she should start with a low dose and increase gradually while ensuring adequate fluid intake to avoid bowel blockage. She will speak to her PCP regarding referral to GI for the chronic diarrhea.     Follow-up  The patient will follow up in December 2025.     PROCEDURE  The patient underwent a lumpectomy for left breast cancer in 2020. She also had a colonoscopy about a year ago.        I spent 28  minutes caring for Helena on this date of service. This time includes time spent by me in the following activities:preparing for the visit, reviewing tests, performing a medically appropriate examination and/or evaluation , counseling and educating the patient/family/caregiver, ordering medications, tests, or procedures, referring and communicating with other health care professionals , documenting information in the medical record, and independently interpreting results and communicating that information with the patient/family/caregiver    Patient Follow Up: 11 months in December 2025. She will see Dr. Arshad.     Patient was given instructions and counseling regarding her condition or for health maintenance advice. Please see specific information pulled into the AVS if appropriate.     JOHN Hooks    1/29/2025    .tob    Patient or patient representative verbalized consent for the use of Ambient Listening during the visit with  JOHN Hooks for chart documentation. 1/29/2025  13:45 EST

## 2025-02-04 LAB
CYTO UR: NORMAL
LAB AP CASE REPORT: NORMAL
LAB AP CLINICAL INFORMATION: NORMAL
LAB AP DIAGNOSIS COMMENT: NORMAL
LAB AP SPECIAL STAINS: NORMAL
LAB AP SYNOPTIC CHECKLIST: NORMAL
Lab: NORMAL
Lab: NORMAL
PATH REPORT.ADDENDUM SPEC: NORMAL
PATH REPORT.FINAL DX SPEC: NORMAL
PATH REPORT.GROSS SPEC: NORMAL

## 2025-02-17 DIAGNOSIS — Z17.0 MALIGNANT NEOPLASM OF LOWER-OUTER QUADRANT OF LEFT BREAST OF FEMALE, ESTROGEN RECEPTOR POSITIVE: ICD-10-CM

## 2025-02-17 DIAGNOSIS — C50.512 MALIGNANT NEOPLASM OF LOWER-OUTER QUADRANT OF LEFT BREAST OF FEMALE, ESTROGEN RECEPTOR POSITIVE: ICD-10-CM

## 2025-02-18 RX ORDER — EXEMESTANE 25 MG/1
25 TABLET ORAL DAILY
Qty: 90 TABLET | Refills: 1 | Status: SHIPPED | OUTPATIENT
Start: 2025-02-18

## 2025-02-27 RX ORDER — TRAZODONE HYDROCHLORIDE 50 MG/1
TABLET ORAL
Qty: 60 TABLET | Refills: 3 | Status: SHIPPED | OUTPATIENT
Start: 2025-02-27

## 2025-03-06 RX ORDER — ATORVASTATIN CALCIUM 20 MG/1
20 TABLET, FILM COATED ORAL DAILY
Qty: 90 TABLET | Refills: 1 | Status: SHIPPED | OUTPATIENT
Start: 2025-03-06

## 2025-03-31 ENCOUNTER — OFFICE (OUTPATIENT)
Dept: URBAN - METROPOLITAN AREA CLINIC 76 | Facility: CLINIC | Age: 75
End: 2025-03-31
Payer: COMMERCIAL

## 2025-03-31 VITALS
DIASTOLIC BLOOD PRESSURE: 78 MMHG | HEIGHT: 64 IN | OXYGEN SATURATION: 97 % | WEIGHT: 234 LBS | HEART RATE: 64 BPM | SYSTOLIC BLOOD PRESSURE: 130 MMHG

## 2025-03-31 DIAGNOSIS — K21.9 GASTRO-ESOPHAGEAL REFLUX DISEASE WITHOUT ESOPHAGITIS: ICD-10-CM

## 2025-03-31 DIAGNOSIS — R19.7 DIARRHEA, UNSPECIFIED: ICD-10-CM

## 2025-03-31 DIAGNOSIS — Z86.0100 PERSONAL HISTORY OF COLON POLYPS, UNSPECIFIED: ICD-10-CM

## 2025-03-31 PROCEDURE — 99204 OFFICE O/P NEW MOD 45 MIN: CPT | Performed by: NURSE PRACTITIONER

## 2025-03-31 RX ORDER — ESOMEPRAZOLE MAGNESIUM 40 MG/1
40 CAPSULE, DELAYED RELEASE ORAL
Qty: 90 | Refills: 3 | Status: ACTIVE
Start: 2025-03-31

## 2025-04-08 ENCOUNTER — OFFICE VISIT (OUTPATIENT)
Dept: INTERNAL MEDICINE | Facility: CLINIC | Age: 75
End: 2025-04-08
Payer: MEDICARE

## 2025-04-08 VITALS
SYSTOLIC BLOOD PRESSURE: 158 MMHG | WEIGHT: 232 LBS | BODY MASS INDEX: 39.61 KG/M2 | DIASTOLIC BLOOD PRESSURE: 84 MMHG | HEIGHT: 64 IN | HEART RATE: 72 BPM

## 2025-04-08 DIAGNOSIS — M85.88 OTHER SPECIFIED DISORDERS OF BONE DENSITY AND STRUCTURE, OTHER SITE: Primary | ICD-10-CM

## 2025-04-08 DIAGNOSIS — I10 ESSENTIAL HYPERTENSION: Chronic | ICD-10-CM

## 2025-04-08 DIAGNOSIS — S09.90XD INJURY OF HEAD, SUBSEQUENT ENCOUNTER: ICD-10-CM

## 2025-04-08 DIAGNOSIS — E04.1 THYROID NODULE GREATER THAN OR EQUAL TO 1.5 CM IN DIAMETER INCIDENTALLY NOTED ON IMAGING STUDY: ICD-10-CM

## 2025-04-08 RX ORDER — AMLODIPINE BESYLATE 2.5 MG/1
2.5 TABLET ORAL DAILY
Qty: 90 TABLET | Refills: 1 | Status: SHIPPED | OUTPATIENT
Start: 2025-04-08

## 2025-04-08 RX ORDER — ESOMEPRAZOLE MAGNESIUM 40 MG/1
40 CAPSULE, DELAYED RELEASE ORAL
COMMUNITY

## 2025-04-08 NOTE — PROGRESS NOTES
"Chief Complaint  Hypertension    Subjective        Helena Romero presents to [facility]  History of Present Illness  here for reg f/u but also had a fall 2 days ago- she was helping her  up and he pulled her over when she was helping him.  Hit her forehead on hardwood floor. No LOC. Went to ER due to being on anticoagulants. CT negative- showed thyroid nodules that need further eval. She had some mild nausea and headache initially but that has resolved.   She has quit drinking Diet Cokes  Diarrhea has gotten better- went away after she took Augmentin for ear infection. Seeing GI who wants her to have stool samples- although stools have been normal. She has also been eating much better- no fried foods, dairy, red meat.   BP was elevated in ER- checks since she's been home have been better. 147/78 this am.  Having some leg swelling- for about 2 months- no SOB, CP- wants to try to increase hctz for short time.       Objective   Vital Signs:  /84   Pulse 72   Ht 162.6 cm (64\")   Wt 105 kg (232 lb)   BMI 39.82 kg/m²   Estimated body mass index is 39.82 kg/m² as calculated from the following:    Height as of this encounter: 162.6 cm (64\").    Weight as of this encounter: 105 kg (232 lb).            Physical Exam  Constitutional:       Appearance: Normal appearance.   HENT:      Right Ear: Tympanic membrane and ear canal normal.      Left Ear: Tympanic membrane and ear canal normal.   Cardiovascular:      Rate and Rhythm: Normal rate. Rhythm irregular.   Pulmonary:      Effort: Pulmonary effort is normal.      Breath sounds: Normal breath sounds.   Musculoskeletal:      Right lower leg: Edema present.      Left lower leg: Edema present.   Psychiatric:         Mood and Affect: Mood normal.        Result Review :  The following data was reviewed by: Ally Lagos MD on 04/08/2025:  Common labs          7/9/2024    14:58   Common Labs   Glucose 93    BUN 8    Creatinine 0.86  "   Sodium 139    Potassium 4.0    Chloride 102    Calcium 9.2    Albumin 4.2    Total Bilirubin 0.6    Alkaline Phosphatase 126    AST (SGOT) 20    ALT (SGPT) 16    Total Cholesterol 124    Triglycerides 60    HDL Cholesterol 58    LDL Cholesterol  53      Data reviewed : Consultant notes GI and Recent hospitalization notes WILVER Guevara           Assessment and Plan   Diagnoses and all orders for this visit:    1. Other specified disorders of bone density and structure, other site (Primary)  -     DEXA Bone Density Axial    2. Thyroid nodule greater than or equal to 1.5 cm in diameter incidentally noted on imaging study  Comments:  check u/s function has been normal  Orders:  -     US Thyroid  -     TSH; Future    3. Essential hypertension  Comments:  add amlodipine 2.5 mg daily- goal < 140/90. check at home.  Orders:  -     amLODIPine (NORVASC) 2.5 MG tablet; Take 1 tablet by mouth Daily.  Dispense: 90 tablet; Refill: 1  -     Comprehensive Metabolic Panel; Future    4. Injury of head, subsequent encounter  Comments:  ER notes reviewed- exam is reassuring. No evidnece of concussion or long term issues- call with any change.             Follow Up   Return in about 8 weeks (around 6/3/2025) for Recheck, Lab Before FUP.  Patient was given instructions and counseling regarding her condition or for health maintenance advice. Please see specific information pulled into the AVS if appropriate.

## 2025-04-14 ENCOUNTER — TELEPHONE (OUTPATIENT)
Dept: ORTHOPEDIC SURGERY | Facility: CLINIC | Age: 75
End: 2025-04-14

## 2025-04-14 NOTE — TELEPHONE ENCOUNTER
"Caller: Helena Romero \"Juliana\"    Relationship to patient: Self    Best call back number: [phone]    Patient is needing: PT IS STATING THAT HER RIGHT KNEE KEEPS POPPING AND IT MADE HER FALL ON 04/12/25.. PT STATES THAT IT IS NOT HURTING RIGHT NOW AND IS WANTING TO LET DR DENIS KNOW.. PT WANTS TO KNOW WHAT THE NEXT STEPS ARE.. PLEASE ADVISE..        "

## 2025-04-16 ENCOUNTER — HOSPITAL ENCOUNTER (OUTPATIENT)
Dept: BONE DENSITY | Facility: HOSPITAL | Age: 75
Discharge: HOME | End: 2025-04-16
Payer: MEDICARE

## 2025-04-16 ENCOUNTER — HOSPITAL ENCOUNTER (OUTPATIENT)
Dept: ULTRASOUND IMAGING | Facility: HOSPITAL | Age: 75
Discharge: HOME | End: 2025-04-16
Payer: MEDICARE

## 2025-04-16 PROCEDURE — 76536 US EXAM OF HEAD AND NECK: CPT

## 2025-04-16 PROCEDURE — 77080 DXA BONE DENSITY AXIAL: CPT

## 2025-04-22 ENCOUNTER — OFFICE VISIT (OUTPATIENT)
Dept: ORTHOPEDIC SURGERY | Facility: CLINIC | Age: 75
End: 2025-04-22
Payer: MEDICARE

## 2025-04-22 VITALS
BODY MASS INDEX: 39.61 KG/M2 | HEIGHT: 64 IN | HEART RATE: 63 BPM | SYSTOLIC BLOOD PRESSURE: 151 MMHG | WEIGHT: 232 LBS | DIASTOLIC BLOOD PRESSURE: 74 MMHG | OXYGEN SATURATION: 95 %

## 2025-04-22 DIAGNOSIS — M25.561 RIGHT KNEE PAIN, UNSPECIFIED CHRONICITY: Primary | ICD-10-CM

## 2025-04-22 DIAGNOSIS — M17.11 OSTEOARTHRITIS OF RIGHT KNEE, UNSPECIFIED OSTEOARTHRITIS TYPE: ICD-10-CM

## 2025-04-22 NOTE — PROGRESS NOTES
"Chief Complaint  Initial Evaluation of the Right Knee     Subjective      Helena Romero presents to [facility] for initial evaluation of the right knee.  She had a fall on the right knee.  She was working out in the yard and felt like her knee was going to give way.  She went into the house later that night and she had a fall on the bathroom floor.  She notes popping and cracking of the right knee.  She has braced the knee at times and she uses the copper fit brace at times also.      Allergies   Allergen Reactions    Codeine Nausea Only    Lisinopril Hives    Azithromycin Unknown - Low Severity        Social History     Socioeconomic History    Marital status:    Tobacco Use    Smoking status: Never    Smokeless tobacco: Never   Vaping Use    Vaping status: Never Used   Substance and Sexual Activity    Alcohol use: No    Drug use: No    Sexual activity: Defer        I reviewed the patient's chief complaint, history of present illness, review of systems, past medical history, surgical history, family history, social history, medications, and allergy list.     Review of Systems     Constitutional: Denies fevers, chills, weight loss  Cardiovascular: Denies chest pain, shortness of breath  Skin: Denies rashes, acute skin changes  Neurologic: Denies headache, loss of consciousness        Vital Signs:   /74   Pulse 63   Ht 162.6 cm (64\")   Wt 105 kg (232 lb)   SpO2 95%   BMI 39.82 kg/m²          Physical Exam  General: Alert. No acute distress    Ortho Exam        RIGHT KNEE Flexion 115. Extension 0. Stable to varus/valgus stress. Stable to anterior/posterior drawer. Neurovascularly intact. Pain with Edelmira. Negative Lachman. Positive EHL, FHL, HS and TA. Sensation intact to light touch all 5 nerves of the foot. Ambulates with Antalgic gait. Patella is well tracking. Calf supple, non-tender. Positive tenderness to the medial joint line. Positive tenderness to the lateral " joint line. Positive Crepitus. Good strength to hamstrings, quadriceps, dorsiflexors, and plantar flexors.  Knee Extensor Mechanism intact Mild swelling.         Procedures      Imaging Results (Most Recent)       Procedure Component Value Units Date/Time    XR Knee 3 View Right [[id number]] Resulted: 04/22/25 1354     Updated: 04/22/25 1404             Result Review :     X-Ray Report:  Right knee X-Ray  Indication: Evaluation of the right knee  AP/Lateral and [place] view(s)  Findings: Moderate degenerative changes of the right knee.   Prior studies available for comparison: no           Assessment and Plan     Diagnoses and all orders for this visit:    1. Right knee pain, unspecified chronicity (Primary)  -     XR Knee 3 View Right    2. Osteoarthritis of right knee, unspecified osteoarthritis type        Discussed the treatment plan with the patient. I reviewed the X-rays that were obtained today with the patient.     Prescribed physical therapy for strengthening of the right knee. HEP exercises given.     Wear brace as needed.       Call or return if worsening symptoms.    Follow Up     PRN      Patient was given instructions and counseling regarding her condition or for health maintenance advice. Please see specific information pulled into the AVS if appropriate.     Scribed for Dione Vega MD by Marlys Hernandez MA.  04/22/25   14:04 EDT    I have personally performed the services described in this document as scribed by the above individual and it is both accurate and complete. Dione Vega MD 04/23/25

## 2025-04-23 ENCOUNTER — TELEPHONE (OUTPATIENT)
Dept: INTERNAL MEDICINE | Facility: CLINIC | Age: 75
End: 2025-04-23
Payer: MEDICARE

## 2025-04-23 DIAGNOSIS — E04.1 THYROID NODULE GREATER THAN OR EQUAL TO 1.5 CM IN DIAMETER INCIDENTALLY NOTED ON IMAGING STUDY: Primary | ICD-10-CM

## 2025-05-02 ENCOUNTER — TELEPHONE (OUTPATIENT)
Dept: INTERNAL MEDICINE | Facility: CLINIC | Age: 75
End: 2025-05-02
Payer: MEDICARE

## 2025-05-02 NOTE — TELEPHONE ENCOUNTER
"Caller: Helena Romero \"Juliana\"    Relationship: Self    Best call back number: [phone]     What medication are you requesting: ANTIBIOTICS FOR CRACKLES I HEARD IN PATIENT IN LOWER LUNGS BY CARDIOLOGIST AT [facility]    What are your current symptoms: NO OTHER SYMPTOMS    If a prescription is needed, what is your preferred pharmacy and phone number: [id number] [place] - [address]  AT [facility] OF [address] - [phone]  - [phone] FX     Additional notes:  AMPLODIPINE SEEMS TO HAVE CAUSED SWELLING IN LEGS TO INCREASE; PATIENT TOOK HCTZ TO GET THE SWELLING DOWN BUT SHE IS CONCERNED ABOUT THE CRACKLES BECAUSE OF HER HISTORY OF PNEUMONIA.    PLEASE CALL PATIENT ASAP TO ADVISE    "

## 2025-05-02 NOTE — TELEPHONE ENCOUNTER
Spoke to patient advised of provider message she states she will go to [facility] in [place] because she can't make it in to the office

## 2025-05-02 NOTE — TELEPHONE ENCOUNTER
She would need to be seen to decide if she needs antibiotics. If she is not having any fevers, productive cough, significant worsening shortness of breath I have low suspicion of her having pneumonia

## 2025-05-14 ENCOUNTER — HOSPITAL ENCOUNTER (OUTPATIENT)
Dept: ULTRASOUND IMAGING | Facility: HOSPITAL | Age: 75
Discharge: HOME | End: 2025-05-14
Payer: MEDICARE

## 2025-05-14 VITALS
SYSTOLIC BLOOD PRESSURE: 148 MMHG | RESPIRATION RATE: 16 BRPM | HEIGHT: 64 IN | TEMPERATURE: 97.3 F | BODY MASS INDEX: 39.09 KG/M2 | OXYGEN SATURATION: 97 % | HEART RATE: 61 BPM | DIASTOLIC BLOOD PRESSURE: 65 MMHG | WEIGHT: 229 LBS

## 2025-05-14 DIAGNOSIS — E04.1 THYROID NODULE GREATER THAN OR EQUAL TO 1.5 CM IN DIAMETER INCIDENTALLY NOTED ON IMAGING STUDY: ICD-10-CM

## 2025-05-14 DIAGNOSIS — E04.1 THYROID NODULE: ICD-10-CM

## 2025-05-14 PROCEDURE — 25010000002 LIDOCAINE 1 % SOLUTION: Performed by: NURSE PRACTITIONER

## 2025-05-14 PROCEDURE — 88305 TISSUE EXAM BY PATHOLOGIST: CPT | Performed by: INTERNAL MEDICINE

## 2025-05-14 PROCEDURE — 88173 CYTOPATH EVAL FNA REPORT: CPT | Performed by: INTERNAL MEDICINE

## 2025-05-14 RX ORDER — LIDOCAINE HYDROCHLORIDE 10 MG/ML
10 INJECTION, SOLUTION INFILTRATION; PERINEURAL ONCE
Status: COMPLETED | OUTPATIENT
Start: 2025-05-14 | End: 2025-05-14

## 2025-05-14 RX ADMIN — LIDOCAINE HYDROCHLORIDE 2 ML: 10 INJECTION, SOLUTION INFILTRATION; PERINEURAL at 14:12

## 2025-05-14 NOTE — DISCHARGE INSTRUCTIONS
EDUCATION / DISCHARGE INSTRUCTIONS  Aspiration:  An aspiration is a procedure used to take a sample of cells or tissue from a lump, mass or other area of concern.   Within a week the radiologist will send a report to your physician.  A pathologist will also examine the tissue and send a report.  THIS EDUCATION INFORMATION WAS REVIEWED PRIOR TO THE PROCEDURE AND CONSENT.  Patient initials_________________Time________________      Post Procedure:    *  Rest today (no pushing pulling or straining).   *  Slowly increase activity tomorow.    *  If you received sedation do not drive for 24 hours.   *  Keep dressing clean and dry.   *  Leave dressing on puncture site for 24 hours.    *  You may shower when dressing removed.   *  If needed, use an ice pack at the site for up to 20 minutes at a time for pain.    Call your doctor if experiencing:   *  Signs of infection such as redness, swelling, excessive pain and / or foul      smelling drainage from the puncture site.   *  Chills or fever over 101 degrees (by mouth).   *  Unrelieved pain.   *  Any new or severe symptoms.      Following the procedure:     Follow-up with the ordering physician as directed.    Continue to take other medications as directed by your physician unless  otherwise instructed.   If applicable, resume taking your blood thinners or Aspirin on ___5/15/24________.     If you have any concerns please call the Radiology Nurses Desk at [phone].  You are the most important factor in your recovery.  Follow the above instructions carefully.

## 2025-05-15 ENCOUNTER — TELEPHONE (OUTPATIENT)
Dept: RADIOLOGY | Facility: HOSPITAL | Age: 75
End: 2025-05-15
Payer: MEDICARE

## 2025-05-15 LAB
CYTO UR: NORMAL
LAB AP CASE REPORT: NORMAL
LAB AP DIAGNOSIS COMMENT: NORMAL
LAB AP NON-GYN SPECIMEN ADEQUACY: NORMAL
PATH REPORT.FINAL DX SPEC: NORMAL
PATH REPORT.GROSS SPEC: NORMAL

## 2025-05-15 NOTE — TELEPHONE ENCOUNTER
"Patient called this RN returning call from earlier. This RN let patient know just chekcing on her after her procedure yesterday. Patient states, \"I am doing fine just a little sore, but I took Tylenol which is helping.\" Patient got message from Dr. Lagos's office stating results from biopsy were benign. Patient very happy & appreciated call.   "

## 2025-05-20 ENCOUNTER — TELEPHONE (OUTPATIENT)
Dept: INTERNAL MEDICINE | Facility: CLINIC | Age: 75
End: 2025-05-20
Payer: MEDICARE

## 2025-05-20 RX ORDER — OLMESARTAN MEDOXOMIL 40 MG/1
40 TABLET ORAL DAILY
Qty: 90 TABLET | Refills: 1 | Status: SHIPPED | OUTPATIENT
Start: 2025-05-20

## 2025-05-20 NOTE — TELEPHONE ENCOUNTER
"  Caller: Helena Romero \"Juliana\"    Relationship: Self    Best call back number: [phone]    Requested Prescriptions:   Requested Prescriptions     Pending Prescriptions Disp Refills    olmesartan (BENICAR) 40 MG tablet 90 tablet 1     Sig: Take 1 tablet by mouth Daily.        Pharmacy where request should be sent: [id number] - [place] - [address] AT [facility] [address] - [phone] [facility] [phone] FX     Last office visit with prescribing clinician: 4/8/2025   Last telemedicine visit with prescribing clinician: Visit date not found   Next office visit with prescribing clinician: 6/3/2025     Additional details provided by patient: PATIENT HAS TWO DAYS LEFT    Does the patient have less than a 3 day supply:  [x] Yes  [] No    Would you like a call back once the refill request has been completed: [] Yes [x] No    If the office needs to give you a call back, can they leave a voicemail: [] Yes [x] No    Maria Isabel Agee Rep   05/20/25 14:34 EDT         "

## 2025-06-03 ENCOUNTER — OFFICE VISIT (OUTPATIENT)
Dept: INTERNAL MEDICINE | Facility: CLINIC | Age: 75
End: 2025-06-03
Payer: MEDICARE

## 2025-06-03 VITALS
DIASTOLIC BLOOD PRESSURE: 60 MMHG | WEIGHT: 227 LBS | BODY MASS INDEX: 38.76 KG/M2 | HEIGHT: 64 IN | SYSTOLIC BLOOD PRESSURE: 138 MMHG | HEART RATE: 76 BPM

## 2025-06-03 DIAGNOSIS — I10 ESSENTIAL HYPERTENSION: Primary | Chronic | ICD-10-CM

## 2025-06-03 DIAGNOSIS — I48.0 PAROXYSMAL ATRIAL FIBRILLATION: Chronic | ICD-10-CM

## 2025-06-03 DIAGNOSIS — M15.0 PRIMARY OSTEOARTHRITIS INVOLVING MULTIPLE JOINTS: Chronic | ICD-10-CM

## 2025-06-03 DIAGNOSIS — I34.0 MITRAL VALVE INSUFFICIENCY, UNSPECIFIED ETIOLOGY: Chronic | ICD-10-CM

## 2025-06-03 NOTE — PROGRESS NOTES
"Chief Complaint  Hypertension and Hyperlipidemia    Subjective        Helena Romero presents to [facility]  Hypertension  Hyperlipidemia      Doing PT for her R knee issues- seems to be hleing.  She is seeing Dr. Kamara about her diarrhea- stool studies done- no report back - they added Nexium instead of omeprazole and it is improved.   Thyroid FNA negative- qyear u/s  Didn't take the amlodipine for long because of swelling-   Likely to d/c aromasin in December.   BP has been better- having an echo to look at diastolic function.     Objective   Vital Signs:  /60   Pulse 76   Ht 162.6 cm (64\")   Wt 103 kg (227 lb)   BMI 38.96 kg/m²   Estimated body mass index is 38.96 kg/m² as calculated from the following:    Height as of this encounter: 162.6 cm (64\").    Weight as of this encounter: 103 kg (227 lb).            Physical Exam  Constitutional:       Appearance: Normal appearance.   Cardiovascular:      Rate and Rhythm: Normal rate.   Pulmonary:      Effort: Pulmonary effort is normal.      Comments: Few crackles at L>R lung base  Musculoskeletal:      Right lower leg: No edema.      Left lower leg: No edema.        Result Review :                Assessment and Plan   Diagnoses and all orders for this visit:    1. Essential hypertension (Primary)  Comments:  Better controlled, no change.    2. Mitral valve insufficiency, unspecified etiology  Comments:  agree with getting echo- no evidence of decompensation    3. Paroxysmal atrial fibrillation  Comments:  tolerates well- anticoagulated    4. Primary osteoarthritis involving multiple joints  Comments:  cont with PT - stressed associaiton with weight.             Follow Up   No follow-ups on file.  Patient was given instructions and counseling regarding her condition or for health maintenance advice. Please see specific information pulled into the AVS if appropriate.           "

## 2025-06-04 RX ORDER — OMEPRAZOLE 20 MG/1
20 CAPSULE, DELAYED RELEASE ORAL DAILY
Qty: 90 CAPSULE | Refills: 1 | OUTPATIENT
Start: 2025-06-04

## 2025-06-10 RX ORDER — ATORVASTATIN CALCIUM 20 MG/1
20 TABLET, FILM COATED ORAL DAILY
Qty: 90 TABLET | Refills: 1 | Status: SHIPPED | OUTPATIENT
Start: 2025-06-10

## 2025-07-23 ENCOUNTER — OFFICE (OUTPATIENT)
Dept: URBAN - METROPOLITAN AREA CLINIC 76 | Facility: CLINIC | Age: 75
End: 2025-07-23
Payer: MEDICARE

## 2025-07-23 VITALS
HEIGHT: 64 IN | DIASTOLIC BLOOD PRESSURE: 70 MMHG | SYSTOLIC BLOOD PRESSURE: 148 MMHG | HEART RATE: 72 BPM | WEIGHT: 227 LBS | DIASTOLIC BLOOD PRESSURE: 71 MMHG | SYSTOLIC BLOOD PRESSURE: 149 MMHG

## 2025-07-23 DIAGNOSIS — K21.9 GASTRO-ESOPHAGEAL REFLUX DISEASE WITHOUT ESOPHAGITIS: ICD-10-CM

## 2025-07-23 DIAGNOSIS — Z86.0100 PERSONAL HISTORY OF COLON POLYPS, UNSPECIFIED: ICD-10-CM

## 2025-07-23 PROCEDURE — 99213 OFFICE O/P EST LOW 20 MIN: CPT | Performed by: NURSE PRACTITIONER

## 2025-08-14 RX ORDER — OLMESARTAN MEDOXOMIL 40 MG/1
40 TABLET ORAL DAILY
Qty: 90 TABLET | Refills: 1 | Status: SHIPPED | OUTPATIENT
Start: 2025-08-14

## (undated) DEVICE — 3M™ STERI-STRIP™ REINFORCED ADHESIVE SKIN CLOSURES, R1547, 1/2 IN X 4 IN (12 MM X 100 MM), 6 STRIPS/ENVELOPE: Brand: 3M™ STERI-STRIP™

## (undated) DEVICE — SUT MNCRYL PLS ANTIB UD 4/0 PS2 18IN

## (undated) DEVICE — PENCL E/S ULTRAVAC TELESCP NOSE HOLSTR 10FT

## (undated) DEVICE — LN SMPL CO2 SHTRM SD STREAM W/M LUER

## (undated) DEVICE — HANDPIECE, SINGLE-USE, SAVI SCOUT®: Brand: SAVI SCOUT®

## (undated) DEVICE — PK CHST BRST 40

## (undated) DEVICE — CONTAINER,SPECIMEN,OR STERILE,4OZ: Brand: MEDLINE

## (undated) DEVICE — CVR TRANSD CIV FLX TPR 11.9 TO 3.8X61CM

## (undated) DEVICE — KT ORCA ORCAPOD DISP STRL

## (undated) DEVICE — SENSR O2 OXIMAX FNGR A/ 18IN NONSTR

## (undated) DEVICE — TUBING, SUCTION, 1/4" X 10', STRAIGHT: Brand: MEDLINE

## (undated) DEVICE — DRSNG WND GZ PAD BORDERED 4X8IN STRL

## (undated) DEVICE — TRAP FLD MINIVAC MEGADYNE 100ML

## (undated) DEVICE — MSK ENDO PORT O2 POM ELITE CURAPLEX A/

## (undated) DEVICE — DRSNG WND BORDR/ADHS NONADHR/GZ LF 4X4IN STRL

## (undated) DEVICE — SINGLE-USE BIOPSY FORCEPS: Brand: RADIAL JAW 4

## (undated) DEVICE — ANTIBACTERIAL UNDYED BRAIDED (POLYGLACTIN 910), SYNTHETIC ABSORBABLE SUTURE: Brand: COATED VICRYL

## (undated) DEVICE — CANN O2 ETCO2 FITS ALL CONN CO2 SMPL A/ 7IN DISP LF

## (undated) DEVICE — ADAPT CLN BIOGUARD AIR/H2O DISP

## (undated) DEVICE — SNAR POLYP CAPTIVATOR RND STFF 2.4 240CM 10MM 1P/U

## (undated) DEVICE — INTENDED FOR TISSUE SEPARATION, AND OTHER PROCEDURES THAT REQUIRE A SHARP SURGICAL BLADE TO PUNCTURE OR CUT.: Brand: BARD-PARKER ® CARBON RIB-BACK BLADES

## (undated) DEVICE — THE SINGLE USE ETRAP – POLYP TRAP IS USED FOR SUCTION RETRIEVAL OF ENDOSCOPICALLY REMOVED POLYPS.: Brand: ETRAP

## (undated) DEVICE — SKIN PREP TRAY W/CHG: Brand: MEDLINE INDUSTRIES, INC.

## (undated) DEVICE — GLV SURG BIOGEL LTX PF 7

## (undated) DEVICE — Device: Brand: SPOT EX ENDOSCOPIC TATTOO

## (undated) DEVICE — STPLR SKIN VISISTAT WD 35CT

## (undated) DEVICE — SUT VIC 3/0 TIES 18IN J110T

## (undated) DEVICE — THE CARR-LOCKE INJECTION NEEDLE IS A SINGLE USE, DISPOSABLE, FLEXIBLE SHEATH INJECTION NEEDLE USED FOR THE INJECTION OF VARIOUS TYPES OF MEDIA THROUGH FLEXIBLE ENDOSCOPES.

## (undated) DEVICE — NDL HYPO PRECISIONGLIDE REG 25G 1 1/2